# Patient Record
Sex: MALE | Race: WHITE | Employment: OTHER | URBAN - METROPOLITAN AREA
[De-identification: names, ages, dates, MRNs, and addresses within clinical notes are randomized per-mention and may not be internally consistent; named-entity substitution may affect disease eponyms.]

---

## 2017-01-09 ENCOUNTER — TELEPHONE (OUTPATIENT)
Dept: CARDIOTHORACIC SURGERY | Age: 72
End: 2017-01-09

## 2017-01-09 NOTE — TELEPHONE ENCOUNTER
Patients wife got sick over wkend, he has developed alittle congestion. Wanted to know what he could do to help prevent getting worse. He has PAT's 1/12, surgery 1/19. Chanelle patient-Roboctic SABINE takedown/CABG.

## 2017-01-10 NOTE — TELEPHONE ENCOUNTER
Pt has some nasal drainage and congestion. No cough, fever, chills. Will monitor temp daily. Has PAT on 1/12, will eval then for surgery. Will discuss w/ Blade.

## 2017-01-12 ENCOUNTER — HOSPITAL ENCOUNTER (OUTPATIENT)
Dept: PREADMISSION TESTING | Age: 72
Discharge: HOME | End: 2017-01-12
Attending: NURSE PRACTITIONER
Payer: MEDICARE

## 2017-01-12 ENCOUNTER — HOSPITAL ENCOUNTER (OUTPATIENT)
Dept: GENERAL RADIOLOGY | Age: 72
Discharge: HOME | End: 2017-01-12
Attending: NURSE PRACTITIONER
Payer: MEDICARE

## 2017-01-12 ENCOUNTER — HOSPITAL ENCOUNTER (OUTPATIENT)
Dept: VASCULAR SURGERY | Age: 72
Discharge: HOME | End: 2017-01-12
Attending: NURSE PRACTITIONER
Payer: MEDICARE

## 2017-01-12 ENCOUNTER — TELEPHONE (OUTPATIENT)
Dept: CARDIOLOGY CLINIC | Age: 72
End: 2017-01-12

## 2017-01-12 ENCOUNTER — HOSPITAL ENCOUNTER (OUTPATIENT)
Dept: PULMONOLOGY | Age: 72
Discharge: HOME | End: 2017-01-12
Attending: NURSE PRACTITIONER
Payer: MEDICARE

## 2017-01-12 VITALS
SYSTOLIC BLOOD PRESSURE: 127 MMHG | WEIGHT: 244 LBS | BODY MASS INDEX: 32.34 KG/M2 | DIASTOLIC BLOOD PRESSURE: 77 MMHG | HEIGHT: 73 IN

## 2017-01-12 DIAGNOSIS — I25.10 ATHEROSCLEROSIS OF NATIVE CORONARY ARTERY OF NATIVE HEART WITHOUT ANGINA PECTORIS: Primary | ICD-10-CM

## 2017-01-12 DIAGNOSIS — I25.10 ATHEROSCLEROSIS OF NATIVE CORONARY ARTERY OF NATIVE HEART WITHOUT ANGINA PECTORIS: ICD-10-CM

## 2017-01-12 LAB
ALBUMIN SERPL BCP-MCNC: 3.9 G/DL (ref 3.5–5)
ALBUMIN/GLOB SERPL: 1.5 {RATIO} (ref 1.1–2.2)
ALP SERPL-CCNC: 58 U/L (ref 45–117)
ALT SERPL-CCNC: 21 U/L (ref 12–78)
ANION GAP BLD CALC-SCNC: 8 MMOL/L (ref 5–15)
APPEARANCE UR: CLEAR
APTT PPP: 26.7 SEC (ref 22.1–32.5)
ARTERIAL PATENCY WRIST A: YES
AST SERPL W P-5'-P-CCNC: 12 U/L (ref 15–37)
ATRIAL RATE: 67 BPM
BACTERIA URNS QL MICRO: NEGATIVE /HPF
BASE DEFICIT BLD-SCNC: 1 MMOL/L
BASOPHILS # BLD AUTO: 0 K/UL (ref 0–0.1)
BASOPHILS # BLD: 0 % (ref 0–1)
BDY SITE: NORMAL
BILIRUB SERPL-MCNC: 0.9 MG/DL (ref 0.2–1)
BILIRUB UR QL: NEGATIVE
BUN SERPL-MCNC: 17 MG/DL (ref 6–20)
BUN/CREAT SERPL: 20 (ref 12–20)
CALCIUM SERPL-MCNC: 8.9 MG/DL (ref 8.5–10.1)
CALCULATED P AXIS, ECG09: 34 DEGREES
CALCULATED R AXIS, ECG10: -2 DEGREES
CALCULATED T AXIS, ECG11: 13 DEGREES
CHLORIDE SERPL-SCNC: 105 MMOL/L (ref 97–108)
CO2 SERPL-SCNC: 27 MMOL/L (ref 21–32)
COLOR UR: NORMAL
CREAT SERPL-MCNC: 0.85 MG/DL (ref 0.7–1.3)
DIAGNOSIS, 93000: NORMAL
DIFFERENTIAL METHOD BLD: ABNORMAL
EOSINOPHIL # BLD: 0.3 K/UL (ref 0–0.4)
EOSINOPHIL NFR BLD: 4 % (ref 0–7)
EPITH CASTS URNS QL MICRO: NORMAL /LPF
ERYTHROCYTE [DISTWIDTH] IN BLOOD BY AUTOMATED COUNT: 17.4 % (ref 11.5–14.5)
EST. AVERAGE GLUCOSE BLD GHB EST-MCNC: 128 MG/DL
GAS FLOW.O2 O2 DELIVERY SYS: NORMAL L/MIN
GLOBULIN SER CALC-MCNC: 2.6 G/DL (ref 2–4)
GLUCOSE SERPL-MCNC: 126 MG/DL (ref 65–100)
GLUCOSE UR STRIP.AUTO-MCNC: NEGATIVE MG/DL
HBA1C MFR BLD: 6.1 % (ref 4.2–6.3)
HCO3 BLD-SCNC: 23.9 MMOL/L (ref 22–26)
HCT VFR BLD AUTO: 31.3 % (ref 36.6–50.3)
HGB BLD-MCNC: 10.2 G/DL (ref 12.1–17)
HGB UR QL STRIP: NEGATIVE
HYALINE CASTS URNS QL MICRO: NORMAL /LPF (ref 0–5)
INR PPP: 1.1 (ref 0.9–1.1)
KETONES UR QL STRIP.AUTO: NEGATIVE MG/DL
LEUKOCYTE ESTERASE UR QL STRIP.AUTO: NEGATIVE
LYMPHOCYTES # BLD AUTO: 31 % (ref 12–49)
LYMPHOCYTES # BLD: 2 K/UL (ref 0.8–3.5)
MAGNESIUM SERPL-MCNC: 1.7 MG/DL (ref 1.6–2.4)
MCH RBC QN AUTO: 19.1 PG (ref 26–34)
MCHC RBC AUTO-ENTMCNC: 32.6 G/DL (ref 30–36.5)
MCV RBC AUTO: 58.7 FL (ref 80–99)
MONOCYTES # BLD: 0.4 K/UL (ref 0–1)
MONOCYTES NFR BLD AUTO: 7 % (ref 5–13)
NEUTS SEG # BLD: 3.7 K/UL (ref 1.8–8)
NEUTS SEG NFR BLD AUTO: 58 % (ref 32–75)
NITRITE UR QL STRIP.AUTO: NEGATIVE
O2/TOTAL GAS SETTING VFR VENT: 21 %
P-R INTERVAL, ECG05: 204 MS
PCO2 BLD: 37.2 MMHG (ref 35–45)
PH BLD: 7.42 [PH] (ref 7.35–7.45)
PH UR STRIP: 5.5 [PH] (ref 5–8)
PLATELET # BLD AUTO: 197 K/UL (ref 150–400)
PO2 BLD: 85 MMHG (ref 80–100)
POTASSIUM SERPL-SCNC: 3.7 MMOL/L (ref 3.5–5.1)
PROT SERPL-MCNC: 6.5 G/DL (ref 6.4–8.2)
PROT UR STRIP-MCNC: NEGATIVE MG/DL
PROTHROMBIN TIME: 10.9 SEC (ref 9–11.1)
Q-T INTERVAL, ECG07: 402 MS
QRS DURATION, ECG06: 102 MS
QTC CALCULATION (BEZET), ECG08: 424 MS
RBC # BLD AUTO: 5.33 M/UL (ref 4.1–5.7)
RBC #/AREA URNS HPF: NORMAL /HPF (ref 0–5)
RBC MORPH BLD: ABNORMAL
SAO2 % BLD: 97 % (ref 92–97)
SODIUM SERPL-SCNC: 140 MMOL/L (ref 136–145)
SP GR UR REFRACTOMETRY: 1.02 (ref 1–1.03)
SPECIMEN TYPE: NORMAL
THERAPEUTIC RANGE,PTTT: NORMAL SECS (ref 58–77)
TSH SERPL DL<=0.05 MIU/L-ACNC: 1.81 UIU/ML (ref 0.36–3.74)
UA: UC IF INDICATED,UAUC: NORMAL
UROBILINOGEN UR QL STRIP.AUTO: 0.2 EU/DL (ref 0.2–1)
VENTRICULAR RATE, ECG03: 67 BPM
WBC # BLD AUTO: 6.4 K/UL (ref 4.1–11.1)
WBC URNS QL MICRO: NORMAL /HPF (ref 0–4)

## 2017-01-12 PROCEDURE — 82803 BLOOD GASES ANY COMBINATION: CPT

## 2017-01-12 PROCEDURE — 85025 COMPLETE CBC W/AUTO DIFF WBC: CPT | Performed by: NURSE PRACTITIONER

## 2017-01-12 PROCEDURE — 83735 ASSAY OF MAGNESIUM: CPT | Performed by: NURSE PRACTITIONER

## 2017-01-12 PROCEDURE — 84443 ASSAY THYROID STIM HORMONE: CPT | Performed by: NURSE PRACTITIONER

## 2017-01-12 PROCEDURE — 83036 HEMOGLOBIN GLYCOSYLATED A1C: CPT | Performed by: NURSE PRACTITIONER

## 2017-01-12 PROCEDURE — 71020 XR CHEST PA LAT: CPT

## 2017-01-12 PROCEDURE — 85730 THROMBOPLASTIN TIME PARTIAL: CPT | Performed by: NURSE PRACTITIONER

## 2017-01-12 PROCEDURE — 81001 URINALYSIS AUTO W/SCOPE: CPT | Performed by: NURSE PRACTITIONER

## 2017-01-12 PROCEDURE — 93880 EXTRACRANIAL BILAT STUDY: CPT

## 2017-01-12 PROCEDURE — 93005 ELECTROCARDIOGRAM TRACING: CPT

## 2017-01-12 PROCEDURE — 36600 WITHDRAWAL OF ARTERIAL BLOOD: CPT

## 2017-01-12 PROCEDURE — 86900 BLOOD TYPING SEROLOGIC ABO: CPT | Performed by: NURSE PRACTITIONER

## 2017-01-12 PROCEDURE — 80053 COMPREHEN METABOLIC PANEL: CPT | Performed by: NURSE PRACTITIONER

## 2017-01-12 PROCEDURE — 36415 COLL VENOUS BLD VENIPUNCTURE: CPT | Performed by: NURSE PRACTITIONER

## 2017-01-12 PROCEDURE — 85610 PROTHROMBIN TIME: CPT | Performed by: NURSE PRACTITIONER

## 2017-01-12 PROCEDURE — 86920 COMPATIBILITY TEST SPIN: CPT | Performed by: NURSE PRACTITIONER

## 2017-01-12 PROCEDURE — 94010 BREATHING CAPACITY TEST: CPT

## 2017-01-12 RX ORDER — AMIODARONE HYDROCHLORIDE 400 MG/1
400 TABLET ORAL DAILY
Qty: 2 TAB | Refills: 0 | Status: SHIPPED | OUTPATIENT
Start: 2017-01-17 | End: 2017-01-24

## 2017-01-12 RX ORDER — METOPROLOL TARTRATE 25 MG/1
12.5 TABLET, FILM COATED ORAL 2 TIMES DAILY
Qty: 2 TAB | Refills: 0 | Status: SHIPPED | OUTPATIENT
Start: 2017-01-17 | End: 2017-01-24

## 2017-01-12 RX ORDER — CHLORHEXIDINE GLUCONATE 1.2 MG/ML
15 RINSE ORAL 2 TIMES DAILY
Qty: 60 ML | Refills: 0 | Status: SHIPPED | OUTPATIENT
Start: 2017-01-17 | End: 2017-01-24

## 2017-01-12 RX ORDER — MUPIROCIN 20 MG/G
OINTMENT TOPICAL 2 TIMES DAILY
Qty: 22 G | Refills: 0 | Status: SHIPPED | OUTPATIENT
Start: 2017-01-17 | End: 2017-01-24

## 2017-01-12 NOTE — PROGRESS NOTES
Anesthesia  Consult note     Anesthesia consult requested by the surgeon for:  suitability of patient for General anesthesia for  minimal  Invasive surgery   contraindications for  One lung ventilation  Contraindication for CRISTIANA  Suitability for invasive lines    Subjective:      Patient:  Gonzalez Stack     Procedure:       No Known Allergies    Current Outpatient Prescriptions   Medication Sig    CRESTOR 10 mg tablet TAKE ONE TABLET BY MOUTH EVERY EVENING    SITAGLIPTIN PHOSPHATE (JANUVIA PO) Take  by mouth.  tadalafil (CIALIS) 5 mg tablet Take 5 mg by mouth daily.  amLODIPine (NORVASC) 10 mg tablet Take  by mouth daily.  aspirin delayed-release 81 mg tablet Take  by mouth daily.  lisinopril-hydrochlorothiazide (PRINZIDE, ZESTORETIC) 20-25 mg per tablet Take 1 Tab by mouth daily. No current facility-administered medications for this encounter. Past Medical History   Diagnosis Date    DJD (degenerative joint disease) of knee 11/2013     left TKR    ED (erectile dysfunction)     Essential hypertension     Hyperlipidemia     T2DM (type 2 diabetes mellitus) ([facility]     Thalassemia minor        Past Surgical History   Procedure Laterality Date    Hx heart catheterization  2004     mild CAD, EF 60-65%    Stress test cardiolite  5/14/2004     5 min, inferior ischemia, EF 60%    Stress test cardiolite  6/30/11     4 min, normal perfusion EF 50%    Hx knee arthroscopy Left 11/2013    Hx shoulder replacement Right 1968       Social History   Substance Use Topics    Smoking status: Former Smoker     Packs/day: 2.00     Years: 15.00     Quit date: 11/4/1981    Smokeless tobacco: Never Used      Comment: quit > 30 years ago    Alcohol use Yes      Comment: rare social          Anesthetic Problems: None in the past   patient denies any problems with swallowing, esophageal stricture, upper GI bleed, GI surgery. No contraindications for CRISTIANA.     Objective:     Physical Exam:    No data found.      No data recorded. Airway Class: 1  Heart-  Regular rate and rhythm,   s1 s2 heard 2/6 murmer. Lungs- Clear bilateral on ascultation  Abd- Soft, non tender no organomegaly  Limbs- Normal  Neuro- Normal    Labs:   Recent Results (from the past 24 hour(s))   EKG, 12 LEAD, INITIAL    Collection Time: 01/12/17 10:33 AM   Result Value Ref Range    Ventricular Rate 67 BPM    Atrial Rate 67 BPM    P-R Interval 204 ms    QRS Duration 102 ms    Q-T Interval 402 ms    QTC Calculation (Bezet) 424 ms    Calculated P Axis 34 degrees    Calculated R Axis -2 degrees    Calculated T Axis 13 degrees    Diagnosis       Normal sinus rhythm  Normal ECG  When compared with ECG of 04-NOV-2013 11:43,  No significant change was found     POC G3 - PUL    Collection Time: 01/12/17 11:18 AM   Result Value Ref Range    FIO2 (POC) 21 %    pH (POC) 7.416 7.35 - 7.45      pCO2 (POC) 37.2 35.0 - 45.0 MMHG    pO2 (POC) 85 80 - 100 MMHG    HCO3 (POC) 23.9 22 - 26 MMOL/L    sO2 (POC) 97 92 - 97 %    Base deficit (POC) 1 mmol/L    Site LEFT RADIAL      Device: ROOM AIR      Allens test (POC) YES      Specimen type (POC) ARTERIAL           Assessment:       Active Problems:    * No active hospital problems. *      ASA4    Plan/Recommendations/Medical Decision Making:       Anesthetic Plan: GETA with invasive monitoring, post op ventilation and CRISTIANA monitoring  Risks and benefits of the anesthetic plan discussed and agreed upon by the patient.       no contraindications  For one lung ventilation, large bore lines, CRISTIANA  Signed By: Diallo Landa MD  Date:           1/12/2017  Time:          12:23 PM

## 2017-01-12 NOTE — PROCEDURES
[facility]  *** FINAL REPORT ***    Name: Lesley Kay  MRN: [id number]    Outpatient  : 18 Mar 1945  HIS Order #: [id number]  [address] [facility] Visit #: [id number]  Date: 2017    TYPE OF TEST: Cerebrovascular Duplex    REASON FOR TEST  Pre op    Right Carotid:-             Proximal               Mid                 Distal  cm/s  Systolic  Diastolic  Systolic  Diastolic  Systolic  Diastolic  CCA:     02.3      17.0                            70.0      21.0  Bulb:  ECA:     78.0      12.0  ICA:     59.0      21.0                            57.0      17.0  ICA/CCA:  0.8       1.0    ICA Stenosis:    Right Vertebral:-  Finding: Antegrade  Sys:       42.0  Sosa:       14.0    Right Subclavian:    Left Carotid:-            Proximal                Mid                 Distal  cm/s  Systolic  Diastolic  Systolic  Diastolic  Systolic  Diastolic  CCA:     23.3      19.0                            70.0      17.0  Bulb:  ECA:     59.0       9.0  ICA:     72.0      18.0                            49.0      18.0  ICA/CCA:  1.0       1.1    ICA Stenosis:    Left Vertebral:-  Finding: Antegrade  Sys:       42.0  Sosa:       13.0    Left Subclavian:    INTERPRETATION/FINDINGS  PROCEDURE:  Color duplex ultrasound imaging of extracranial  cerebrovascular arteries. FINDINGS:       Right:  Internal carotid velocity is within normal limits. There   is narrowing of the flow channel on color Doppler imaging and  non-calcific plaque on B-mode imaging, consistent with less than 50  percent stenosis. The common and external carotid arteries are patent   and without evidence of hemodynamically significant stenosis. Left:  nternal carotid velocity is within normal limits. There  is narrowing of the flow channel on color Doppler imaging and calcific   plaque on B-mode imaging, consistent with less than 50 percent  stenosis.   The common and external carotid arteries are patent and  without evidence of hemodynamically significant stenosis. IMPRESSION:  Consistent with less than 50% stenosis of the right  internal carotid and less than 50% stenosis of the left internal  carotid. Vertebrals are patent with antegrade flow. ADDITIONAL COMMENTS    I have personally reviewed the data relevant to the interpretation of  this  study. TECHNOLOGIST: Dania Mueller.  Jose Alejandro Christopher  Signed: 01/12/2017 11:04 AM    PHYSICIAN: Michael Lopez MD  Signed: 01/12/2017 12:04 PM

## 2017-01-12 NOTE — TELEPHONE ENCOUNTER
Elina Loyola called to inform our providers that the patient is scheduled to have Coronary Artery Bypass Graft Surgery (CABG) take down on January 19, 2017 at [facility]. If necessary, she can be reached at [phone]. Thanks!

## 2017-01-13 NOTE — PROCEDURES
[address]   [address], [address]   PULMONARY FUNCTION       Name:  Marlon Fine   MR#:  [id number]   :  1945   Account #:  [de-identified]    Date of Procedure:  2017   Date of Adm:  2017       CLINICAL INDICATIONS: Shortness of breath. SPIROMETRY PERFORMED:  Spirometry is within normal limits,   without evidence of air flow obstruction.   The flow volume loop is   normal.         MD SIOMARA Webster / Yoshi Forde   D:  2017   11:20   T:  2017   13:25   Job #:  [id number]

## 2017-01-17 ENCOUNTER — TELEPHONE (OUTPATIENT)
Dept: CARDIOLOGY CLINIC | Age: 72
End: 2017-01-17

## 2017-01-17 NOTE — TELEPHONE ENCOUNTER
Called cath lab and reserved cath lab for inpatient PCI Monday 1/23/17 at 8:15. Notified Dr. Elmer Caruso. Orders, auth, etc to be done by hospital since \"inpatient status. \"

## 2017-01-17 NOTE — TELEPHONE ENCOUNTER
----- Message from Luna Nichols MD sent at 1/17/2017  8:25 AM EST -----  Please schedule for PCI Monday a.m. 1/23. He should be an inpatient following CABG.

## 2017-01-18 ENCOUNTER — ANESTHESIA EVENT (OUTPATIENT)
Dept: CARDIOTHORACIC SURGERY | Age: 72
DRG: 232 | End: 2017-01-18
Payer: MEDICARE

## 2017-01-18 ENCOUNTER — TELEPHONE (OUTPATIENT)
Dept: CASE MANAGEMENT | Age: 72
End: 2017-01-18

## 2017-01-18 NOTE — TELEPHONE ENCOUNTER
Cardiac Surgery Care Coordinator-  Called Jeanette Christiansen to review plan of care and day of surgery expectations. Encouraged Jeanette Christiansen to verbalize and offered emotional support. Jeanette Christiansen is without questions or concerns at this time.   Yosi Viera RN

## 2017-01-19 ENCOUNTER — ANESTHESIA (OUTPATIENT)
Dept: CARDIOTHORACIC SURGERY | Age: 72
DRG: 232 | End: 2017-01-19
Payer: MEDICARE

## 2017-01-19 ENCOUNTER — APPOINTMENT (OUTPATIENT)
Dept: GENERAL RADIOLOGY | Age: 72
DRG: 232 | End: 2017-01-19
Attending: NURSE PRACTITIONER
Payer: MEDICARE

## 2017-01-19 PROBLEM — I25.10 CAD (CORONARY ARTERY DISEASE): Status: ACTIVE | Noted: 2017-01-19

## 2017-01-19 PROCEDURE — 77030008771 HC TU NG SALEM SUMP -A: Performed by: ANESTHESIOLOGY

## 2017-01-19 PROCEDURE — 77030008671 HC TU ENDO/BRNC CUF COVD -B: Performed by: ANESTHESIOLOGY

## 2017-01-19 PROCEDURE — 77030008684 HC TU ET CUF COVD -B: Performed by: ANESTHESIOLOGY

## 2017-01-19 PROCEDURE — 71010 XR CHEST PORT: CPT

## 2017-01-19 PROCEDURE — 74011000258 HC RX REV CODE- 258

## 2017-01-19 PROCEDURE — 74011250636 HC RX REV CODE- 250/636: Performed by: NURSE PRACTITIONER

## 2017-01-19 PROCEDURE — 77030026438 HC STYL ET INTUB CARD -A: Performed by: ANESTHESIOLOGY

## 2017-01-19 PROCEDURE — 74011636637 HC RX REV CODE- 636/637

## 2017-01-19 PROCEDURE — 74011000250 HC RX REV CODE- 250

## 2017-01-19 PROCEDURE — P9045 ALBUMIN (HUMAN), 5%, 250 ML: HCPCS

## 2017-01-19 PROCEDURE — 77030020061 HC IV BLD WRMR ADMIN SET 3M -B: Performed by: ANESTHESIOLOGY

## 2017-01-19 PROCEDURE — 74011250636 HC RX REV CODE- 250/636

## 2017-01-19 RX ORDER — PROPOFOL 10 MG/ML
INJECTION, EMULSION INTRAVENOUS
Status: DISCONTINUED | OUTPATIENT
Start: 2017-01-19 | End: 2017-01-19 | Stop reason: HOSPADM

## 2017-01-19 RX ORDER — SODIUM CHLORIDE, SODIUM LACTATE, POTASSIUM CHLORIDE, CALCIUM CHLORIDE 600; 310; 30; 20 MG/100ML; MG/100ML; MG/100ML; MG/100ML
INJECTION, SOLUTION INTRAVENOUS
Status: DISCONTINUED | OUTPATIENT
Start: 2017-01-19 | End: 2017-01-19

## 2017-01-19 RX ORDER — ALBUMIN HUMAN 50 G/1000ML
SOLUTION INTRAVENOUS AS NEEDED
Status: DISCONTINUED | OUTPATIENT
Start: 2017-01-19 | End: 2017-01-19 | Stop reason: HOSPADM

## 2017-01-19 RX ORDER — SUCCINYLCHOLINE CHLORIDE 20 MG/ML
INJECTION INTRAMUSCULAR; INTRAVENOUS AS NEEDED
Status: DISCONTINUED | OUTPATIENT
Start: 2017-01-19 | End: 2017-01-19 | Stop reason: HOSPADM

## 2017-01-19 RX ORDER — PROPOFOL 10 MG/ML
INJECTION, EMULSION INTRAVENOUS
Status: DISCONTINUED | OUTPATIENT
Start: 2017-01-19 | End: 2017-01-19

## 2017-01-19 RX ORDER — ROCURONIUM BROMIDE 10 MG/ML
INJECTION, SOLUTION INTRAVENOUS AS NEEDED
Status: DISCONTINUED | OUTPATIENT
Start: 2017-01-19 | End: 2017-01-19 | Stop reason: HOSPADM

## 2017-01-19 RX ORDER — MORPHINE SULFATE 4 MG/ML
INJECTION, SOLUTION INTRAMUSCULAR; INTRAVENOUS AS NEEDED
Status: DISCONTINUED | OUTPATIENT
Start: 2017-01-19 | End: 2017-01-19 | Stop reason: HOSPADM

## 2017-01-19 RX ORDER — SODIUM CHLORIDE 9 MG/ML
INJECTION, SOLUTION INTRAVENOUS
Status: DISCONTINUED | OUTPATIENT
Start: 2017-01-19 | End: 2017-01-19 | Stop reason: HOSPADM

## 2017-01-19 RX ORDER — GLYCOPYRROLATE 0.2 MG/ML
INJECTION INTRAMUSCULAR; INTRAVENOUS AS NEEDED
Status: DISCONTINUED | OUTPATIENT
Start: 2017-01-19 | End: 2017-01-19 | Stop reason: HOSPADM

## 2017-01-19 RX ORDER — PHENYLEPHRINE HCL IN 0.9% NACL 0.4MG/10ML
SYRINGE (ML) INTRAVENOUS AS NEEDED
Status: DISCONTINUED | OUTPATIENT
Start: 2017-01-19 | End: 2017-01-19 | Stop reason: HOSPADM

## 2017-01-19 RX ORDER — MIDAZOLAM HYDROCHLORIDE 1 MG/ML
INJECTION, SOLUTION INTRAMUSCULAR; INTRAVENOUS AS NEEDED
Status: DISCONTINUED | OUTPATIENT
Start: 2017-01-19 | End: 2017-01-19 | Stop reason: HOSPADM

## 2017-01-19 RX ORDER — HEPARIN SODIUM 1000 [USP'U]/ML
INJECTION, SOLUTION INTRAVENOUS; SUBCUTANEOUS AS NEEDED
Status: DISCONTINUED | OUTPATIENT
Start: 2017-01-19 | End: 2017-01-19 | Stop reason: HOSPADM

## 2017-01-19 RX ORDER — SUFENTANIL CITRATE 50 UG/ML
INJECTION EPIDURAL; INTRAVENOUS AS NEEDED
Status: DISCONTINUED | OUTPATIENT
Start: 2017-01-19 | End: 2017-01-19 | Stop reason: HOSPADM

## 2017-01-19 RX ORDER — SODIUM CHLORIDE, SODIUM LACTATE, POTASSIUM CHLORIDE, CALCIUM CHLORIDE 600; 310; 30; 20 MG/100ML; MG/100ML; MG/100ML; MG/100ML
INJECTION, SOLUTION INTRAVENOUS
Status: DISCONTINUED | OUTPATIENT
Start: 2017-01-19 | End: 2017-01-19 | Stop reason: HOSPADM

## 2017-01-19 RX ORDER — NEOSTIGMINE METHYLSULFATE 1 MG/ML
INJECTION INTRAVENOUS AS NEEDED
Status: DISCONTINUED | OUTPATIENT
Start: 2017-01-19 | End: 2017-01-19 | Stop reason: HOSPADM

## 2017-01-19 RX ORDER — PROTAMINE SULFATE 10 MG/ML
INJECTION, SOLUTION INTRAVENOUS AS NEEDED
Status: DISCONTINUED | OUTPATIENT
Start: 2017-01-19 | End: 2017-01-19 | Stop reason: HOSPADM

## 2017-01-19 RX ORDER — SUFENTANIL CITRATE 50 UG/ML
INJECTION EPIDURAL; INTRAVENOUS
Status: DISCONTINUED | OUTPATIENT
Start: 2017-01-19 | End: 2017-01-19 | Stop reason: HOSPADM

## 2017-01-19 RX ORDER — PROPOFOL 10 MG/ML
INJECTION, EMULSION INTRAVENOUS AS NEEDED
Status: DISCONTINUED | OUTPATIENT
Start: 2017-01-19 | End: 2017-01-19 | Stop reason: HOSPADM

## 2017-01-19 RX ADMIN — ALBUMIN HUMAN 250 ML: 50 SOLUTION INTRAVENOUS at 14:07

## 2017-01-19 RX ADMIN — ROCURONIUM BROMIDE 10 MG: 10 INJECTION, SOLUTION INTRAVENOUS at 13:50

## 2017-01-19 RX ADMIN — Medication 40 MCG: at 15:43

## 2017-01-19 RX ADMIN — PROPOFOL 25 MCG/KG/MIN: 10 INJECTION, EMULSION INTRAVENOUS at 15:30

## 2017-01-19 RX ADMIN — ROCURONIUM BROMIDE 10 MG: 10 INJECTION, SOLUTION INTRAVENOUS at 12:15

## 2017-01-19 RX ADMIN — ROCURONIUM BROMIDE 20 MG: 10 INJECTION, SOLUTION INTRAVENOUS at 10:42

## 2017-01-19 RX ADMIN — ALBUMIN HUMAN 250 ML: 50 SOLUTION INTRAVENOUS at 15:17

## 2017-01-19 RX ADMIN — SODIUM CHLORIDE, SODIUM LACTATE, POTASSIUM CHLORIDE, CALCIUM CHLORIDE: 600; 310; 30; 20 INJECTION, SOLUTION INTRAVENOUS at 08:16

## 2017-01-19 RX ADMIN — ROCURONIUM BROMIDE 10 MG: 10 INJECTION, SOLUTION INTRAVENOUS at 09:36

## 2017-01-19 RX ADMIN — ROCURONIUM BROMIDE 5 MG: 10 INJECTION, SOLUTION INTRAVENOUS at 08:16

## 2017-01-19 RX ADMIN — SODIUM CHLORIDE: 9 INJECTION, SOLUTION INTRAVENOUS at 08:16

## 2017-01-19 RX ADMIN — ROCURONIUM BROMIDE 20 MG: 10 INJECTION, SOLUTION INTRAVENOUS at 13:30

## 2017-01-19 RX ADMIN — HEPARIN SODIUM 10000 UNITS: 1000 INJECTION, SOLUTION INTRAVENOUS; SUBCUTANEOUS at 14:13

## 2017-01-19 RX ADMIN — Medication 40 MCG: at 09:58

## 2017-01-19 RX ADMIN — PROPOFOL 150 MG: 10 INJECTION, EMULSION INTRAVENOUS at 08:16

## 2017-01-19 RX ADMIN — Medication 80 MCG: at 08:16

## 2017-01-19 RX ADMIN — SUFENTANIL CITRATE 30 MCG: 50 INJECTION EPIDURAL; INTRAVENOUS at 08:16

## 2017-01-19 RX ADMIN — Medication 40 MCG: at 10:21

## 2017-01-19 RX ADMIN — Medication 40 MCG: at 09:57

## 2017-01-19 RX ADMIN — PROTAMINE SULFATE 150 MG: 10 INJECTION, SOLUTION INTRAVENOUS at 15:32

## 2017-01-19 RX ADMIN — SUCCINYLCHOLINE CHLORIDE 40 MG: 20 INJECTION INTRAMUSCULAR; INTRAVENOUS at 16:38

## 2017-01-19 RX ADMIN — Medication 80 MCG: at 11:24

## 2017-01-19 RX ADMIN — ROCURONIUM BROMIDE 45 MG: 10 INJECTION, SOLUTION INTRAVENOUS at 08:25

## 2017-01-19 RX ADMIN — Medication 40 MCG: at 10:42

## 2017-01-19 RX ADMIN — SUCCINYLCHOLINE CHLORIDE 160 MG: 20 INJECTION INTRAMUSCULAR; INTRAVENOUS at 08:16

## 2017-01-19 RX ADMIN — SUFENTANIL CITRATE 0.3 MCG/KG/HR: 50 INJECTION EPIDURAL; INTRAVENOUS at 08:41

## 2017-01-19 RX ADMIN — PROPOFOL 10 MG: 10 INJECTION, EMULSION INTRAVENOUS at 16:00

## 2017-01-19 RX ADMIN — Medication 80 MCG: at 12:45

## 2017-01-19 RX ADMIN — CEFAZOLIN 2 G: 1 INJECTION, POWDER, FOR SOLUTION INTRAMUSCULAR; INTRAVENOUS; PARENTERAL at 08:46

## 2017-01-19 RX ADMIN — MIDAZOLAM HYDROCHLORIDE 2 MG: 1 INJECTION, SOLUTION INTRAMUSCULAR; INTRAVENOUS at 08:11

## 2017-01-19 RX ADMIN — ROCURONIUM BROMIDE 20 MG: 10 INJECTION, SOLUTION INTRAVENOUS at 13:59

## 2017-01-19 RX ADMIN — HEPARIN SODIUM 5000 UNITS: 1000 INJECTION, SOLUTION INTRAVENOUS; SUBCUTANEOUS at 14:22

## 2017-01-19 RX ADMIN — NEOSTIGMINE METHYLSULFATE 2.5 MG: 1 INJECTION INTRAVENOUS at 16:41

## 2017-01-19 RX ADMIN — GLYCOPYRROLATE 0.4 MG: 0.2 INJECTION INTRAMUSCULAR; INTRAVENOUS at 16:41

## 2017-01-19 RX ADMIN — CEFAZOLIN 2 G: 1 INJECTION, POWDER, FOR SOLUTION INTRAMUSCULAR; INTRAVENOUS; PARENTERAL at 11:46

## 2017-01-19 RX ADMIN — MORPHINE SULFATE 2 MG: 4 INJECTION, SOLUTION INTRAMUSCULAR; INTRAVENOUS at 16:16

## 2017-01-19 RX ADMIN — MORPHINE SULFATE 2 MG: 4 INJECTION, SOLUTION INTRAMUSCULAR; INTRAVENOUS at 15:36

## 2017-01-19 RX ADMIN — ROCURONIUM BROMIDE 10 MG: 10 INJECTION, SOLUTION INTRAVENOUS at 10:14

## 2017-01-19 RX ADMIN — CEFAZOLIN 2 G: 1 INJECTION, POWDER, FOR SOLUTION INTRAMUSCULAR; INTRAVENOUS; PARENTERAL at 15:08

## 2017-01-19 NOTE — ANESTHESIA PREPROCEDURE EVALUATION
Anesthetic History   No history of anesthetic complications            Review of Systems / Medical History  Patient summary reviewed, nursing notes reviewed and pertinent labs reviewed    Pulmonary  Within defined limits                 Neuro/Psych   Within defined limits           Cardiovascular    Hypertension          CAD and hyperlipidemia         GI/Hepatic/Renal                Endo/Other    Diabetes: type 2    Blood dyscrasia (thalassemia minor)     Other Findings              Physical Exam    Airway  Mallampati: II  TM Distance: > 6 cm  Neck ROM: normal range of motion   Mouth opening: Normal     Cardiovascular    Rhythm: regular  Rate: normal         Dental    Dentition: Bridges, Caps/crowns, Lower dentition intact and Upper dentition intact     Pulmonary  Breath sounds clear to auscultation               Abdominal  GI exam deferred       Other Findings            Anesthetic Plan    ASA: 4  Anesthesia type: general    Monitoring Plan: Arterial line, BIS, CVP, Callahan-Rebekah and CRISTIANA    Post procedure ventilation   Induction: Intravenous  Anesthetic plan and risks discussed with: Patient      Double lumen tube

## 2017-01-19 NOTE — ANESTHESIA PROCEDURE NOTES
Central Line and Pulmonary Artery Catheter Placement    Start time: 1/19/2017 7:11 AM  End time: 1/19/2017 7:27 AM  Performed by: Gabriela Hernandez  Authorized by: Prashanth VOGT     Indications: vascular access, central pressure monitoring and need for vasopressors  Preanesthetic Checklist: patient identified, risks and benefits discussed, anesthesia consent, site marked, patient being monitored and timeout performed      Pre-procedure: All elements of maximal sterile barrier technique followed?  Yes    Maximal barrier precautions followed, 2% Chlorhexidine for cutaneous antisepsis, Hand hygiene performed prior to catheter insertion and Ultrasound guidance    Sterile Ultrasound Technique followed?: Yes          Procedure:   Prep:  Chlorhexidine  Location:  Internal jugular  Orientation:  Right  Patient position:  Trendelenburg  Catheter type:  Double lumen  Catheter size:  9 Fr  Catheter length:  20 cm  Number of attempts:  1  Successful placement: Yes      Assessment:   Post-procedure:  Catheter secured, sterile dressing applied and sterile dressing with CHG applied  Assessment:  Blood return through all ports  Insertion:  Uncomplicated  Patient tolerance:  Patient tolerated the procedure well with no immediate complications  9 Fr MAC and 8 Fr CCO PA Catheter

## 2017-01-19 NOTE — ANESTHESIA PROCEDURE NOTES
Arterial Line Placement    Start time: 1/19/2017 7:02 AM  End time: 1/19/2017 7:06 AM  Performed by: Vidhya Guzman  Authorized by: Xenia VOGT     Pre-Procedure  Indications:  Arterial pressure monitoring  Preanesthetic Checklist: patient identified, risks and benefits discussed, anesthesia consent, site marked, patient being monitored, timeout performed and patient being monitored      Procedure:   Prep:  Chlorhexidine  Seldinger Technique?: Yes    Orientation:  Right  Location:  Radial artery  Catheter size:  20 G  Number of attempts:  1  Cont Cardiac Output Sensor: No      Assessment:   Post-procedure:  Line secured and sterile dressing applied  Patient Tolerance:  Patient tolerated the procedure well with no immediate complications

## 2017-01-20 ENCOUNTER — APPOINTMENT (OUTPATIENT)
Dept: GENERAL RADIOLOGY | Age: 72
DRG: 232 | End: 2017-01-20
Attending: NURSE PRACTITIONER
Payer: MEDICARE

## 2017-01-20 PROBLEM — Z95.1 S/P CABG X 1: Status: ACTIVE | Noted: 2017-01-19

## 2017-01-20 PROBLEM — D62 POSTOPERATIVE ANEMIA DUE TO ACUTE BLOOD LOSS: Status: ACTIVE | Noted: 2017-01-20

## 2017-01-20 PROCEDURE — 71010 XR CHEST PORT: CPT

## 2017-01-20 NOTE — ANESTHESIA POSTPROCEDURE EVALUATION
Post-Anesthesia Evaluation and Assessment    Patient: Tara Bahena MRN: [id number]  SSN: [id number]    YOB: 1945  Age: 70 y.o. Sex: male       Cardiovascular Function/Vital Signs  Visit Vitals    /57 (BP 1 Location: Right arm, BP Patient Position: Supine)    Pulse 76    Temp 37.2 °C (99 °F)    Resp 14    Ht 6' 1\" (1.854 m)    Wt 113.1 kg (249 lb 5.4 oz)    SpO2 94%    BMI 32.9 kg/m2       Patient is status post general anesthesia for Procedure(s):  DAVINCI SABINE TAKEDOWN, LEFT ANTERIOR THORACOTOMY, OFF PUMP CORONARY ARTERY BYPASS GRAFTING X1, CRISTIANA BY DR ESPINAL. Nausea/Vomiting: None    Postoperative hydration reviewed and adequate. Pain:  Pain Scale 1: Numeric (0 - 10) (01/20/17 1010)  Pain Intensity 1: 4 (01/20/17 1010)   Managed    Neurological Status:   Neuro (WDL): Within Defined Limits (01/19/17 7681)  Neuro  Neurologic State: Drowsy; Eyes open spontaneously (01/19/17 2000)  Orientation Level: Oriented X4 (01/19/17 2000)  Cognition: Decreased attention/concentration; Follows commands (01/19/17 2000)  Speech: Clear (01/19/17 2000)   At baseline    Mental Status and Level of Consciousness: Arousable    Pulmonary Status:   O2 Device: Room air (01/20/17 1000)   Adequate oxygenation and airway patent    Complications related to anesthesia: None    Post-anesthesia assessment completed.  No concerns    Signed By: Edson Cha MD     January 20, 2017

## 2017-01-21 ENCOUNTER — APPOINTMENT (OUTPATIENT)
Dept: GENERAL RADIOLOGY | Age: 72
DRG: 232 | End: 2017-01-21
Attending: NURSE PRACTITIONER
Payer: MEDICARE

## 2017-01-21 LAB
ABO + RH BLD: NORMAL
BLD PROD TYP BPU: NORMAL
BLOOD GROUP ANTIBODIES SERPL: NORMAL
BPU ID: NORMAL
CROSSMATCH RESULT,%XM: NORMAL
SPECIMEN EXP DATE BLD: NORMAL
STATUS OF UNIT,%ST: NORMAL
UNIT DIVISION, %UDIV: 0

## 2017-01-21 PROCEDURE — 71010 XR CHEST PORT: CPT

## 2017-01-22 ENCOUNTER — APPOINTMENT (OUTPATIENT)
Dept: GENERAL RADIOLOGY | Age: 72
DRG: 232 | End: 2017-01-22
Attending: NURSE PRACTITIONER
Payer: MEDICARE

## 2017-01-22 PROCEDURE — 71010 XR CHEST PORT: CPT

## 2017-01-23 ENCOUNTER — APPOINTMENT (OUTPATIENT)
Dept: GENERAL RADIOLOGY | Age: 72
DRG: 232 | End: 2017-01-23
Attending: NURSE PRACTITIONER
Payer: MEDICARE

## 2017-01-23 PROCEDURE — 71010 XR CHEST PORT: CPT

## 2017-01-24 ENCOUNTER — APPOINTMENT (OUTPATIENT)
Dept: GENERAL RADIOLOGY | Age: 72
DRG: 232 | End: 2017-01-24
Attending: NURSE PRACTITIONER
Payer: MEDICARE

## 2017-01-24 PROCEDURE — 71020 XR CHEST PA LAT: CPT

## 2017-01-25 ENCOUNTER — TELEPHONE (OUTPATIENT)
Dept: CASE MANAGEMENT | Age: 72
End: 2017-01-25

## 2017-01-25 NOTE — TELEPHONE ENCOUNTER
Cardiac Surgery Discharge - Follow up call placed to [facility]. Left voicemail message. Kellie Jade RN

## 2017-01-27 ENCOUNTER — OFFICE VISIT (OUTPATIENT)
Dept: CARDIOTHORACIC SURGERY | Age: 72
End: 2017-01-27

## 2017-01-27 VITALS
BODY MASS INDEX: 32.97 KG/M2 | DIASTOLIC BLOOD PRESSURE: 68 MMHG | WEIGHT: 248.8 LBS | SYSTOLIC BLOOD PRESSURE: 122 MMHG | TEMPERATURE: 98 F | HEIGHT: 73 IN | HEART RATE: 62 BPM | OXYGEN SATURATION: 96 % | RESPIRATION RATE: 16 BRPM

## 2017-01-27 DIAGNOSIS — Z95.1 S/P CABG X 1: Primary | ICD-10-CM

## 2017-01-27 RX ORDER — OXYCODONE AND ACETAMINOPHEN 5; 325 MG/1; MG/1
1 TABLET ORAL
Qty: 40 TAB | Refills: 0 | Status: SHIPPED | OUTPATIENT
Start: 2017-01-27 | End: 2017-02-08

## 2017-01-27 NOTE — PROGRESS NOTES
Patient: Timoteo Baptiste   Age: 70 y.o. Patient Care Team:  Ruthy Bolivar MD as PCP - General (Family Practice)  Garcia Kerr MD as Physician (Cardiology)  Manny Desai MD as Surgeon (Cardiothoracic Surgery)    Diagnosis: The encounter diagnosis was S/P CABG x 1. Problem List:   Patient Active Problem List   Diagnosis Code    HTN (hypertension), benign I10    Coronary atherosclerosis of native coronary artery I25.10    Type [facility] diabetes mellitus without complication [facility] A74.8    FUCHS (dyspnea on exertion) R06.09    CAD (coronary artery disease) I25.10    S/P CABG x 1 Z95.1    Postoperative anemia due to acute blood loss D62        Date of Surgery: 1-20-17    Surgery: Robotic SABINE takedown and LIMA to LAD    HPI: Patient is here for one week follow up. Has some incisional pain. He is walking. Occasional shortness of breath with walking. Denies chest pain, shortness of breath lying flat, and weight gain. Current Medications:   Current Outpatient Prescriptions   Medication Sig Dispense Refill    LINAGLIPTIN/METFORMIN HCL (JENTADUETO PO) Take 2.5 mg by mouth two (2) times a day. Indications: Diabetes Mellitus      traMADol (ULTRAM) 50 mg tablet Take 1-2 Tabs by mouth every six (6) hours as needed. Max Daily Amount: 400 mg. 40 Tab 0    senna-docusate (PERICOLACE) 8.6-50 mg per tablet Take 1 Tab by mouth two (2) times a day. 30 Tab 0    ferrous sulfate 325 mg (65 mg iron) tablet Take 1 Tab by mouth two (2) times a day. 60 Tab 0    clopidogrel (PLAVIX) 75 mg tab Take 1 Tab by mouth daily. Indications: Thrombosis Prevention after PCI 30 Tab 11    amiodarone (PACERONE) 400 mg tablet Take 400 mg by mouth twice a day x 2 weeks. Then decrease to 400 mg by mouth daily x 2 weeks. Then your done with this med. 42 Tab 0    metoprolol tartrate (LOPRESSOR) 25 mg tablet Take 1 Tab by mouth every twelve (12) hours.  60 Tab 1    CRESTOR 10 mg tablet TAKE ONE TABLET BY MOUTH EVERY EVENING 30 Tab 5  tadalafil (CIALIS) 5 mg tablet Take 5 mg by mouth daily.  amLODIPine (NORVASC) 10 mg tablet Take 10 mg by mouth daily.  aspirin delayed-release 81 mg tablet Take  by mouth daily. Vitals: Blood pressure 122/68, pulse 62, temperature 98 °F (36.7 °C), temperature source Oral, resp. rate 16, height 6' 1\" (1.854 m), weight 248 lb 12.8 oz (112.9 kg), SpO2 96 %. Allergies: has No Known Allergies. Physical Exam:  Wounds: clean, dry, no drainage    Lungs: clear to auscultation bilaterally    Heart: regular rate and rhythm, S1, S2 normal, no murmur, click, rub or gallop    Extremities: 1-2+ Edema    Assessment/Plan:   1.  S/P CABG (Robotic SABINE takedown and LIMA to LAD, hybrid with PCI)  -On Plavix, ASA, statin, and BB  -Continue to increase ambulation  -F/u in 3 weeks    Rehab - Y  Walking: Y

## 2017-02-08 ENCOUNTER — HOSPITAL ENCOUNTER (OUTPATIENT)
Dept: CARDIAC REHAB | Age: 72
Discharge: HOME | End: 2017-02-08
Payer: MEDICARE

## 2017-02-08 VITALS — BODY MASS INDEX: 31.53 KG/M2 | WEIGHT: 239 LBS

## 2017-02-08 VITALS — WEIGHT: 239 LBS | HEIGHT: 73 IN | BODY MASS INDEX: 31.68 KG/M2

## 2017-02-08 PROCEDURE — 93798 PHYS/QHP OP CAR RHAB W/ECG: CPT

## 2017-02-08 NOTE — CARDIO/PULMONARY
Timoteo Baptiste  70 y.o. presented to cardiac wellness for orientation and exercise tolerance test today with a primary diagnosis of DAVINCI SABINE TAKEDOWN, LEFT ANTERIOR THORACOTOMY, OFF PUMP CORONARY ARTERY BYPASS GRAFTING X1 to his LAD 01/19/17 and PCI with stent to 1st diag on 01/23/17. His EF is 60%. Timoteo Baptiste has no previous cardiac history. Cardiac risk factors include dyslipidemia, diabetes mellitus, obesity, hypertension, stress, inactivity and these were reviewed with Mr. & Mrs. Kathy Bojorquez. Timoteo Baptiste lives with his wife and they have grown children. He is a clinical . PAULA-D, depression score, is 9 and this is considered normal.  Patient denied chest pain or SOB during 6 minute walk and was in SR 1 AVB without ectopy. Timoteo Baptiste will attend a 60 minute class once a week and exercise 2- 3 days a week in cardiac wellness.    Dhaval Garcia RN  2/8/2017

## 2017-02-13 ENCOUNTER — APPOINTMENT (OUTPATIENT)
Dept: CARDIAC REHAB | Age: 72
End: 2017-02-13
Payer: MEDICARE

## 2017-02-13 ENCOUNTER — HOSPITAL ENCOUNTER (OUTPATIENT)
Dept: CARDIAC REHAB | Age: 72
Discharge: HOME | End: 2017-02-13
Payer: MEDICARE

## 2017-02-13 VITALS — WEIGHT: 237 LBS | BODY MASS INDEX: 31.27 KG/M2

## 2017-02-13 PROCEDURE — 93798 PHYS/QHP OP CAR RHAB W/ECG: CPT

## 2017-02-14 ENCOUNTER — OFFICE VISIT (OUTPATIENT)
Dept: CARDIOTHORACIC SURGERY | Age: 72
End: 2017-02-14

## 2017-02-14 ENCOUNTER — HOSPITAL ENCOUNTER (OUTPATIENT)
Dept: GENERAL RADIOLOGY | Age: 72
Discharge: HOME | End: 2017-02-14
Payer: MEDICARE

## 2017-02-14 VITALS — HEART RATE: 64 BPM | OXYGEN SATURATION: 98 % | SYSTOLIC BLOOD PRESSURE: 104 MMHG | DIASTOLIC BLOOD PRESSURE: 64 MMHG

## 2017-02-14 DIAGNOSIS — Z95.1 S/P CABG X 1: Primary | ICD-10-CM

## 2017-02-14 DIAGNOSIS — Z95.1 S/P CABG X 1: ICD-10-CM

## 2017-02-14 PROCEDURE — 71020 XR CHEST PA LAT: CPT

## 2017-02-14 RX ORDER — POTASSIUM CHLORIDE 750 MG/1
20 TABLET, EXTENDED RELEASE ORAL DAILY
Qty: 14 TAB | Refills: 0 | Status: SHIPPED | OUTPATIENT
Start: 2017-02-14 | End: 2017-02-17 | Stop reason: SDUPTHER

## 2017-02-14 RX ORDER — FUROSEMIDE 40 MG/1
40 TABLET ORAL DAILY
Qty: 7 TAB | Refills: 0 | Status: SHIPPED | OUTPATIENT
Start: 2017-02-14 | End: 2017-02-17 | Stop reason: SDUPTHER

## 2017-02-14 NOTE — PROGRESS NOTES
Patient: Iban Curiel   Age: 70 y.o. Patient Care Team:  Pierce Flores MD as PCP - General (Family Practice)  Magda Fatima MD as Physician (Cardiology)  Onur Cortez MD as Surgeon (Cardiothoracic Surgery)    Diagnosis: The encounter diagnosis was S/P CABG x 1. Problem List:   Patient Active Problem List   Diagnosis Code    HTN (hypertension), benign I10    Coronary atherosclerosis of native coronary artery I25.10    Type [facility] U24.7    FUCHS (dyspnea on exertion) R06.09    CAD (coronary artery disease) I25.10    S/P CABG x 1 Z95.1    Postoperative anemia due to acute blood loss D62      HPI: Pt called with complaints of 3-4 days of generalized malaise and some vomiting last PM. He has had more coughing over last 2-3 days. Mild FUCHS, but no SOB at rest. PA/LAT CXR prior to visit. Current Medications:   Current Outpatient Prescriptions   Medication Sig Dispense Refill    furosemide (LASIX) 40 mg tablet Take 1 Tab by mouth daily for 7 days. 7 Tab 0    potassium chloride (K-DUR, KLOR-CON) 10 mEq tablet Take 2 Tabs by mouth daily for 7 days. Indications: HYPOKALEMIA PREVENTION 14 Tab 0    LINAGLIPTIN/METFORMIN HCL (JENTADUETO PO) Take 2.5 mg by mouth two (2) times a day. Indications: Diabetes Mellitus      senna-docusate (PERICOLACE) 8.6-50 mg per tablet Take 1 Tab by mouth two (2) times a day. 30 Tab 0    ferrous sulfate 325 mg (65 mg iron) tablet Take 1 Tab by mouth two (2) times a day. 60 Tab 0    clopidogrel (PLAVIX) 75 mg tab Take 1 Tab by mouth daily. Indications: Thrombosis Prevention after PCI 30 Tab 11    amiodarone (PACERONE) 400 mg tablet Take 400 mg by mouth twice a day x 2 weeks. Then decrease to 400 mg by mouth daily x 2 weeks. Then your done with this med. 42 Tab 0    metoprolol tartrate (LOPRESSOR) 25 mg tablet Take 1 Tab by mouth every twelve (12) hours.  60 Tab 1    CRESTOR 10 mg tablet TAKE ONE TABLET BY MOUTH EVERY EVENING 30 Tab 5    tadalafil (CIALIS) 5 mg tablet Take 5 mg by mouth daily.  amLODIPine (NORVASC) 10 mg tablet Take 10 mg by mouth daily.  aspirin delayed-release 81 mg tablet Take  by mouth daily.  traMADol (ULTRAM) 50 mg tablet Take 1-2 Tabs by mouth every six (6) hours as needed. Max Daily Amount: 400 mg. 40 Tab 0       Vitals: Blood pressure 104/64, pulse 64, SpO2 98 %. Allergies: has No Known Allergies. Physical Exam:  Wounds: clean, dry, no drainage    Lungs: decreased lt base    Heart: regular rate and rhythm, S1, S2 normal, no murmur, click, rub or gallop    Extremities: no edema    Assessment/Plan:   Symptomatic Lt Pleural Effusion- On Plavix, will try gentle diuresis with Lasix 40 every day X 7 days  Likely viral syndrome- increase PO intake, OK to use benadryl for sleep. Treat symptomatically.   Increase activity as tolerated  Call office if symptoms persist or worsen over next 1-2 days

## 2017-02-16 ENCOUNTER — APPOINTMENT (OUTPATIENT)
Dept: CARDIAC REHAB | Age: 72
End: 2017-02-16
Payer: MEDICARE

## 2017-02-17 ENCOUNTER — APPOINTMENT (OUTPATIENT)
Dept: CARDIAC REHAB | Age: 72
End: 2017-02-17
Payer: MEDICARE

## 2017-02-17 ENCOUNTER — OFFICE VISIT (OUTPATIENT)
Dept: CARDIOTHORACIC SURGERY | Age: 72
End: 2017-02-17

## 2017-02-17 VITALS
BODY MASS INDEX: 30.75 KG/M2 | HEART RATE: 85 BPM | HEIGHT: 73 IN | TEMPERATURE: 98.1 F | RESPIRATION RATE: 16 BRPM | DIASTOLIC BLOOD PRESSURE: 56 MMHG | OXYGEN SATURATION: 98 % | SYSTOLIC BLOOD PRESSURE: 100 MMHG | WEIGHT: 232 LBS

## 2017-02-17 DIAGNOSIS — Z95.1 S/P CABG X 1: Primary | ICD-10-CM

## 2017-02-17 RX ORDER — ACETAMINOPHEN 500 MG
1000 TABLET ORAL
COMMUNITY

## 2017-02-17 RX ORDER — POTASSIUM CHLORIDE 750 MG/1
40 TABLET, EXTENDED RELEASE ORAL DAILY
Qty: 68 TAB | Refills: 0 | Status: SHIPPED | OUTPATIENT
Start: 2017-02-17 | End: 2017-03-06

## 2017-02-17 RX ORDER — FUROSEMIDE 40 MG/1
40 TABLET ORAL 2 TIMES DAILY
Qty: 20 TAB | Refills: 0 | Status: SHIPPED | OUTPATIENT
Start: 2017-02-17 | End: 2017-03-23

## 2017-02-17 NOTE — PROGRESS NOTES
Patient: Adelia Reid   Age: 70 y.o. Patient Care Team:  Jennifer Engel MD as PCP - General (Family Practice)  Luther Allison MD as Physician (Cardiology)  Nathan Thomas MD as Surgeon (Cardiothoracic Surgery)    Diagnosis: The encounter diagnosis was S/P CABG x 1. Problem List:   Patient Active Problem List   Diagnosis Code    HTN (hypertension), benign I10    Coronary atherosclerosis of native coronary artery I25.10    Type 2 diabetes mellitus without complication ([facility] T83.6    FUCHS (dyspnea on exertion) R06.09    CAD (coronary artery disease) I25.10    S/P CABG x 1 Z95.1    Postoperative anemia due to acute blood loss D62        Date of Surgery: 1-20-17    Surgery: Robotic SABINE takedown and LIMA to LAD    HPI: Patient is here for follow up. Has had a dry cough for the past several days and low grade fever at home (100.9). Accompanied by some generalized malaise and body aches. Has taken mucinex for cough. Temp normal in office. Current Medications:   Current Outpatient Prescriptions   Medication Sig Dispense Refill    acetaminophen (TYLENOL EXTRA STRENGTH) 500 mg tablet Take 1,000 mg by mouth every six (6) hours as needed for Pain.  GUAIFENESIN/DEXTROMETHORPHAN (MUCINEX DM PO) Take  by mouth.  furosemide (LASIX) 40 mg tablet Take 1 Tab by mouth daily for 7 days. 7 Tab 0    potassium chloride (K-DUR, KLOR-CON) 10 mEq tablet Take 2 Tabs by mouth daily for 7 days. Indications: HYPOKALEMIA PREVENTION 14 Tab 0    LINAGLIPTIN/METFORMIN HCL (JENTADUETO PO) Take 2.5 mg by mouth two (2) times a day. Indications: Diabetes Mellitus      senna-docusate (PERICOLACE) 8.6-50 mg per tablet Take 1 Tab by mouth two (2) times a day. 30 Tab 0    clopidogrel (PLAVIX) 75 mg tab Take 1 Tab by mouth daily. Indications: Thrombosis Prevention after PCI 30 Tab 11    amiodarone (PACERONE) 400 mg tablet Take 400 mg by mouth twice a day x 2 weeks. Then decrease to 400 mg by mouth daily x 2 weeks. Then your done with this med. 42 Tab 0    metoprolol tartrate (LOPRESSOR) 25 mg tablet Take 1 Tab by mouth every twelve (12) hours. 60 Tab 1    CRESTOR 10 mg tablet TAKE ONE TABLET BY MOUTH EVERY EVENING 30 Tab 5    tadalafil (CIALIS) 5 mg tablet Take 5 mg by mouth daily.  amLODIPine (NORVASC) 10 mg tablet Take 10 mg by mouth daily.  aspirin delayed-release 81 mg tablet Take  by mouth daily.  traMADol (ULTRAM) 50 mg tablet Take 1-2 Tabs by mouth every six (6) hours as needed. Max Daily Amount: 400 mg. 40 Tab 0    ferrous sulfate 325 mg (65 mg iron) tablet Take 1 Tab by mouth two (2) times a day. 60 Tab 0       Vitals: Blood pressure 100/56, pulse 85, temperature 98.1 °F (36.7 °C), temperature source Oral, resp. rate 16, height 6' 1\" (1.854 m), weight 232 lb (105.2 kg), SpO2 98 %. Allergies: has No Known Allergies. Physical Exam:  Wounds: clean, dry, no drainage    Lungs: Diminished left base    Heart: regular rate and rhythm, S1, S2 normal, no murmur, click, rub or gallop    Extremities: Mild Edema    Assessment/Plan:   1. S/P CABG (Robotic SABINE takedown and LIMA to LAD, hybrid with PCI)  -Mild/moderate left sided pleural effusion, will increase lasix to BID x 3 days, then back to daily  -CXR prior to next office appointment   2. Cough (likely viral)  -May use robitussin DM, cough drops, or tessalon perles   -Tylenol for fever  -Call if symptoms persist, will try z pack.       Rehab - Y  Walking: Y

## 2017-02-20 ENCOUNTER — APPOINTMENT (OUTPATIENT)
Dept: CARDIAC REHAB | Age: 72
End: 2017-02-20
Payer: MEDICARE

## 2017-02-20 ENCOUNTER — TELEPHONE (OUTPATIENT)
Dept: CARDIAC REHAB | Age: 72
End: 2017-02-20

## 2017-02-23 ENCOUNTER — HOSPITAL ENCOUNTER (OUTPATIENT)
Dept: CARDIAC REHAB | Age: 72
Discharge: HOME | End: 2017-02-23
Payer: MEDICARE

## 2017-02-23 VITALS — WEIGHT: 232 LBS | BODY MASS INDEX: 30.61 KG/M2

## 2017-02-23 PROCEDURE — 93798 PHYS/QHP OP CAR RHAB W/ECG: CPT | Performed by: DIETITIAN, REGISTERED

## 2017-02-23 PROCEDURE — 93797 PHYS/QHP OP CAR RHAB WO ECG: CPT | Performed by: DIETITIAN, REGISTERED

## 2017-02-24 ENCOUNTER — HOSPITAL ENCOUNTER (OUTPATIENT)
Dept: CARDIAC REHAB | Age: 72
Discharge: HOME | End: 2017-02-24
Payer: MEDICARE

## 2017-02-24 VITALS — BODY MASS INDEX: 30.34 KG/M2 | WEIGHT: 230 LBS

## 2017-02-24 PROCEDURE — 93798 PHYS/QHP OP CAR RHAB W/ECG: CPT

## 2017-02-27 ENCOUNTER — HOSPITAL ENCOUNTER (OUTPATIENT)
Dept: CARDIAC REHAB | Age: 72
Discharge: HOME | End: 2017-02-27
Payer: MEDICARE

## 2017-02-27 ENCOUNTER — HOSPITAL ENCOUNTER (OUTPATIENT)
Dept: GENERAL RADIOLOGY | Age: 72
Discharge: HOME | End: 2017-02-27
Payer: MEDICARE

## 2017-02-27 ENCOUNTER — OFFICE VISIT (OUTPATIENT)
Dept: CARDIOTHORACIC SURGERY | Age: 72
End: 2017-02-27

## 2017-02-27 VITALS
HEIGHT: 73 IN | TEMPERATURE: 97.8 F | SYSTOLIC BLOOD PRESSURE: 114 MMHG | WEIGHT: 229 LBS | OXYGEN SATURATION: 98 % | DIASTOLIC BLOOD PRESSURE: 72 MMHG | BODY MASS INDEX: 30.35 KG/M2 | HEART RATE: 71 BPM | RESPIRATION RATE: 16 BRPM

## 2017-02-27 VITALS — BODY MASS INDEX: 30.21 KG/M2 | WEIGHT: 229 LBS

## 2017-02-27 DIAGNOSIS — Z95.1 S/P CABG X 1: Primary | ICD-10-CM

## 2017-02-27 DIAGNOSIS — Z95.1 S/P CABG X 1: ICD-10-CM

## 2017-02-27 PROCEDURE — 93798 PHYS/QHP OP CAR RHAB W/ECG: CPT

## 2017-02-27 NOTE — PROGRESS NOTES
Patient: Funmi Mccauley   Age: 70 y.o. Patient Care Team:  Isom Mortimer, MD as PCP - General (Family Practice)  Toy Orosco MD as Physician (Cardiology)  Crispin Powell MD as Surgeon (Cardiothoracic Surgery)    Diagnosis: The encounter diagnosis was S/P CABG x 1. Problem List:   Patient Active Problem List   Diagnosis Code    HTN (hypertension), benign I10    Coronary atherosclerosis of native coronary artery I25.10    Type 2 diabetes mellitus without complication (HCC) F84.2    FUCHS (dyspnea on exertion) R06.09    CAD (coronary artery disease) I25.10    S/P CABG x 1 Z95.1    Postoperative anemia due to acute blood loss D62      HPI: making good progress, feels better. Has started cardiac rehab. CXR prior to visit. Current Medications:   Current Outpatient Prescriptions   Medication Sig Dispense Refill    acetaminophen (TYLENOL EXTRA STRENGTH) 500 mg tablet Take 1,000 mg by mouth every six (6) hours as needed for Pain.  GUAIFENESIN/DEXTROMETHORPHAN (MUCINEX DM PO) Take  by mouth.  furosemide (LASIX) 40 mg tablet Take 1 Tab by mouth two (2) times a day. Take one tab twice daily x 3 days, then take one tab daily x 14 days (Patient taking differently: Take 40 mg by mouth daily. Take one tab twice daily x 3 days, then take one tab daily x 14 days) 20 Tab 0    potassium chloride (K-DUR, KLOR-CON) 10 mEq tablet Take 4 Tabs by mouth daily for 17 days. Indications: HYPOKALEMIA PREVENTION 68 Tab 0    LINAGLIPTIN/METFORMIN HCL (JENTADUETO PO) Take 2.5 mg by mouth two (2) times a day. Indications: Diabetes Mellitus      clopidogrel (PLAVIX) 75 mg tab Take 1 Tab by mouth daily. Indications: Thrombosis Prevention after PCI 30 Tab 11    metoprolol tartrate (LOPRESSOR) 25 mg tablet Take 1 Tab by mouth every twelve (12) hours. 60 Tab 1    CRESTOR 10 mg tablet TAKE ONE TABLET BY MOUTH EVERY EVENING 30 Tab 5    tadalafil (CIALIS) 5 mg tablet Take 5 mg by mouth daily.       amLODIPine (NORVASC) 10 mg tablet Take 10 mg by mouth daily.  aspirin delayed-release 81 mg tablet Take  by mouth daily.  traMADol (ULTRAM) 50 mg tablet Take 1-2 Tabs by mouth every six (6) hours as needed. Max Daily Amount: 400 mg. 40 Tab 0    senna-docusate (PERICOLACE) 8.6-50 mg per tablet Take 1 Tab by mouth two (2) times a day. 30 Tab 0    ferrous sulfate 325 mg (65 mg iron) tablet Take 1 Tab by mouth two (2) times a day. 60 Tab 0    amiodarone (PACERONE) 400 mg tablet Take 400 mg by mouth twice a day x 2 weeks. Then decrease to 400 mg by mouth daily x 2 weeks. Then your done with this med. 42 Tab 0       Vitals: Blood pressure 114/72, pulse 71, temperature 97.8 °F (36.6 °C), temperature source Oral, resp. rate 16, height 6' 1\" (1.854 m), weight 229 lb (103.9 kg), SpO2 98 %. Allergies: has No Known Allergies. Physical Exam:  Wounds: clean, dry, no drainage, healed    Lungs: clear to auscultation bilaterally    Heart: regular rate and rhythm, S1, S2 normal, no murmur, click, rub or gallop    Extremities: no edema    Assessment/Plan:   Making progress, looks good. Continue Lasix until complete. Continue cardiac rehab. Follow up with Dr Mateo Le.

## 2017-02-28 NOTE — PROGRESS NOTES
..Patient seen and documentation reviewed  Doing well without specific complaints  I reviewed post op recommendations and importance of cardiology and PCP follow up  Will call office with any further questions

## 2017-03-02 ENCOUNTER — APPOINTMENT (OUTPATIENT)
Dept: CARDIAC REHAB | Age: 72
End: 2017-03-02
Payer: MEDICARE

## 2017-03-02 ENCOUNTER — TELEPHONE (OUTPATIENT)
Dept: CARDIAC REHAB | Age: 72
End: 2017-03-02

## 2017-03-02 NOTE — TELEPHONE ENCOUNTER
Shelbi Dubon called P this morning; he will be absent from cardiac rehab today. He plans to return tomorrow, 3/3/17.   Yohana Rebolledo RN

## 2017-03-03 ENCOUNTER — HOSPITAL ENCOUNTER (OUTPATIENT)
Dept: CARDIAC REHAB | Age: 72
Discharge: HOME | End: 2017-03-03
Payer: MEDICARE

## 2017-03-03 VITALS — WEIGHT: 230 LBS | BODY MASS INDEX: 30.34 KG/M2

## 2017-03-03 PROCEDURE — 93798 PHYS/QHP OP CAR RHAB W/ECG: CPT

## 2017-03-17 ENCOUNTER — OFFICE VISIT (OUTPATIENT)
Dept: CARDIOLOGY CLINIC | Age: 72
End: 2017-03-17

## 2017-03-17 VITALS
DIASTOLIC BLOOD PRESSURE: 72 MMHG | HEIGHT: 73 IN | SYSTOLIC BLOOD PRESSURE: 146 MMHG | BODY MASS INDEX: 30.62 KG/M2 | WEIGHT: 231 LBS | HEART RATE: 82 BPM

## 2017-03-17 DIAGNOSIS — I10 HTN (HYPERTENSION), BENIGN: ICD-10-CM

## 2017-03-17 DIAGNOSIS — E11.9 TYPE 2 DIABETES MELLITUS WITHOUT COMPLICATION, WITHOUT LONG-TERM CURRENT USE OF INSULIN (HCC): ICD-10-CM

## 2017-03-17 DIAGNOSIS — Z95.1 S/P CABG X 1: ICD-10-CM

## 2017-03-17 DIAGNOSIS — E78.5 DYSLIPIDEMIA: ICD-10-CM

## 2017-03-17 DIAGNOSIS — I25.10 CORONARY ARTERY DISEASE INVOLVING NATIVE CORONARY ARTERY OF NATIVE HEART WITHOUT ANGINA PECTORIS: Primary | ICD-10-CM

## 2017-03-17 PROBLEM — D62 POSTOPERATIVE ANEMIA DUE TO ACUTE BLOOD LOSS: Status: RESOLVED | Noted: 2017-01-20 | Resolved: 2017-03-17

## 2017-03-17 NOTE — PATIENT INSTRUCTIONS
Get back into cardiac rehab and continue on the road to recovery. Stay active with the knowledge that they teach you in rehab. Continue with your new dietary changed. Eating cleanly - try to avoid processed and packaged foods. Eat mostly plant based foods, avoid refined carbs and if you choose to eat meat know where it comes from (organic, avoid hormones and antibiotics fed animals/fish). Research Sandra Ritchie's impression into diet. Continue Aspirin and Plavix in addition to your blood pressure and cholesterol medications. Continue to follow up with Dr. Benancio Landau for routine lipid checks every 6 months.

## 2017-03-17 NOTE — MR AVS SNAPSHOT
Visit Information Date & Time Provider Department Dept. Phone Encounter #  
 3/17/2017  2:20 PM Ashleigh Villegas MD [facility] Shaileshestee Sandhu [phone] [id number] Follow-up Instructions Return in about 3 months (around 6/17/2017). Upcoming Health Maintenance Date Due Hepatitis C Screening 1945 FOOT EXAM Q1 3/18/1955 EYE EXAM RETINAL OR DILATED Q1 3/18/1955 DTaP/Tdap/Td series (1 - Tdap) 3/18/1966 FOBT Q 1 YEAR AGE 50-75 3/18/1995 ZOSTER VACCINE AGE 60> 3/18/2005 GLAUCOMA SCREENING Q2Y 3/18/2010 Pneumococcal 65+ Low/Medium Risk (1 of 2 - PCV13) 3/18/2010 MEDICARE YEARLY EXAM 3/18/2010 HEMOGLOBIN A1C Q6M 7/12/2017 LIPID PANEL Q1 8/8/2017 MICROALBUMIN Q1 11/22/2017 Allergies as of 3/17/2017  Review Complete On: 3/17/2017 By: Sharon Viera NP No Known Allergies Current Immunizations  Reviewed on 2/8/2017 Name Date Influenza Vaccine 11/23/2016 Not reviewed this visit You Were Diagnosed With   
  
 Codes Comments Coronary artery disease involving native coronary artery of native heart without angina pectoris    -  Primary ICD-10-CM: I25.10 ICD-9-CM: 414.01   
 HTN (hypertension), benign     ICD-10-CM: I10 
ICD-9-CM: 401.1 Type 2 diabetes mellitus without complication, without long-term current use of insulin (HCC)     ICD-10-CM: E11.9 ICD-9-CM: 250.00 S/P CABG x 1     ICD-10-CM: Z95.1 ICD-9-CM: V45.81 Dyslipidemia     ICD-10-CM: E78.5 ICD-9-CM: 272.4 Vitals BP Pulse Height(growth percentile) Weight(growth percentile) BMI Smoking Status 146/72 82 6' 1\" (1.854 m) 231 lb (104.8 kg) 30.48 kg/m2 Former Smoker Vitals History BMI and BSA Data Body Mass Index Body Surface Area  
 30.48 kg/m 2 2.32 m 2 Preferred Pharmacy Pharmacy Name Phone [facility], [address] [place] [address] [phone] Your Updated Medication List  
  
 This list is accurate as of: 3/17/17  3:12 PM.  Always use your most recent med list. amLODIPine 10 mg tablet Commonly known as:  Melissa Fallon Take 10 mg by mouth daily. aspirin delayed-release 81 mg tablet Take  by mouth daily. CIALIS 5 mg tablet Generic drug:  tadalafil Take 5 mg by mouth daily. clopidogrel 75 mg Tab Commonly known as:  PLAVIX Take 1 Tab by mouth daily. Indications: Thrombosis Prevention after PCI  
  
 CRESTOR 10 mg tablet Generic drug:  rosuvastatin TAKE ONE TABLET BY MOUTH EVERY EVENING  
  
 furosemide 40 mg tablet Commonly known as:  LASIX Take 1 Tab by mouth two (2) times a day. Take one tab twice daily x 3 days, then take one tab daily x 14 days JENTADUETO PO Take 2.5 mg by mouth two (2) times a day. Indications: Diabetes Mellitus  
  
 metoprolol tartrate 25 mg tablet Commonly known as:  LOPRESSOR Take 1 Tab by mouth every twelve (12) hours. MUCINEX DM PO Take  by mouth. senna-docusate 8.6-50 mg per tablet Commonly known as:  Fleurette Drafts Take 1 Tab by mouth two (2) times a day. TYLENOL EXTRA STRENGTH 500 mg tablet Generic drug:  acetaminophen Take 1,000 mg by mouth every six (6) hours as needed for Pain. We Performed the Following AMB POC EKG ROUTINE W/ 12 LEADS, INTER & REP [80544 CPT(R)] Follow-up Instructions Return in about 3 months (around 6/17/2017). Patient Instructions Get back into cardiac rehab and continue on the road to recovery. Stay active with the knowledge that they teach you in rehab. Continue with your new dietary changed. Eating cleanly - try to avoid processed and packaged foods. Eat mostly plant based foods, avoid refined carbs and if you choose to eat meat know where it comes from (organic, avoid hormones and antibiotics fed animals/fish). Research Gina Ritchie's impression into diet. Continue Aspirin and Plavix in addition to your blood pressure and cholesterol medications. Continue to follow up with Dr. Susanne Abdullahi for routine lipid checks every 6 months. Introducing [facility] SERVICES! Yesika Villar introduces [employer] patient portal. Now you can access parts of your medical record, email your doctor's office, and request medication refills online. 1. In your internet browser, go to [email or web address] 2. Click on the First Time User? Click Here link in the Sign In box. You will see the New Member Sign Up page. 3. Enter your [employer] Access Code exactly as it appears below. You will not need to use this code after youve completed the sign-up process. If you do not sign up before the expiration date, you must request a new code. · [employer] Access Code: [email or web address] Expires: 4/11/2017 10:03 AM 
 
4. Enter the last four digits of your Social Security Number (xxxx) and Date of Birth (mm/dd/yyyy) as indicated and click Submit. You will be taken to the next sign-up page. 5. Create a [employer] ID. This will be your [employer] login ID and cannot be changed, so think of one that is secure and easy to remember. 6. Create a [employer] password. You can change your password at any time. 7. Enter your Password Reset Question and Answer. This can be used at a later time if you forget your password. 8. Enter your e-mail address. You will receive e-mail notification when new information is available in [address]. 9. Click Sign Up. You can now view and download portions of your medical record. 10. Click the Download Summary menu link to download a portable copy of your medical information. If you have questions, please visit the Frequently Asked Questions section of the [employer] website. Remember, [employer] is NOT to be used for urgent needs. For medical emergencies, dial 911. Now available from your iPhone and Android! Please provide this summary of care documentation to your next provider. Your primary care clinician is listed as Philip 116. If you have any questions after today's visit, please call [phone].

## 2017-03-17 NOTE — PROGRESS NOTES
[id number] [place] [facility], [address] [facility]    Office [phone]  Fax [phone]       Oksana Ruiz is a 70 y.o. male. Last seen 6 months ago. s/p robotic assisted CABG x 1 (LIMA to LAD) performed by Dr. Tiffany Martell on 1/19/17 and PCI/JT to 1st diag on 1/23/17 by Dr. Halina Shane. Assessment  Encounter Diagnoses   Name Primary?  Coronary artery disease involving native coronary artery of native heart without angina pectoris Yes    HTN (hypertension), benign     Type 2 diabetes mellitus without complication, without long-term current use of insulin (HCC)     S/P CABG x 1     Dyslipidemia        Recommendations:  Mr. Sravanthi Conteh has CAD now s/p hybrid CABG/PCI 1/2017. He denies any exertional anginal symptoms. Fatigue gradually improving (likely partially due to post op anemia with Hgb down to ~8). Encouraged him to resume rehab as planned. He remains at risk for CAD progression in the setting of poorly controlled DM, however he has made some positive changes since bypass surgery. Encouraged his recent dietary changes resulting in improved glucose control. Continue ASA and Plavix. Hypertensive in the office today, however SBP 's per home BP log. Continue current medication regimen and continue to monitor at home. Hx of dyslipidemia on Crestor 10 mg daily. Lipids and DM  followed by Dr. Destini Jean. The patient was encouraged to continue current medications, exercise, lose weight and call with any new complaints or concerns. Follow-up Disposition:  Return in about 3 months (around 6/17/2017). Subjective:  Mr. Sravanthi Conteh reports having a slow recovery following bypass. He reports recurrent symptoms consistent with the flu. He reports his illness has limited his ability to engage in cardiac rehab sessions. He reports that he has been in good health for 1 week and plans to resume rehab next week. He has gone back to work part time this week.  His overall level of fatigue has improved. He is no longer napping during the day. He denies any exertional anginal or dyspnea. He denies any palpitations or tachycardia. No syncope or near syncope. His last dose of Amiodarone was given on February 21st, per his wife's records. He continues to take Lasix 40 mg daily; stop date is within 1 week, to address persistent left lower lobe effusion. He denies any orthopnea, edema or paroxysmal nocturnal dyspnea. Home BP monitoring since hospital discharge show SBP 98 to 130's. Blood sugar control has improved; A1c by lab draw on 2/22/17 with Dr. Ofe Castro has improved from 7.6% down to 6.2%. He is here with his wife today. Cardiac risk factors   HTN yes  DM yes    Cardiac testing  Cath 5/21/2004 - EF 60%, mild to moderate coronary calcification, mild diffuse plaque     Exercise cardiolite 6/2011 - normal perfusion, EF 50%    Echo at [facility] 12/18/14 - EF 60-65%, mild LAE,     Stress echo 2/18/15 - 6 min 30 sec, 2-3 mm ST depression but normal stress echo study, resting EF 60%    Exercise cardiolite 10/17/16 - 5 min, mild reversible defects - distal anterior, distal inferior and apex. EF 53%, mild LV dilatation with stress    Cath 11/8/2016 - EDP 17, EF 60%, no AV gradient, mod-severe cor Ca2+, LM normal, LAD mid diffuse disease (70%), D1 focal 85% mid, LCX mild plaque, RCA large dom, mid 50%. Past Medical History:   Diagnosis Date    CAD (coronary artery disease) 01/23/2017    stent    DJD (degenerative joint disease) of knee 11/2013    left TKR    ED (erectile dysfunction)     Essential hypertension     Hyperlipidemia     T2DM (type 2 diabetes mellitus) (HCC)     Thalassemia minor       Current Outpatient Prescriptions   Medication Sig Dispense Refill    acetaminophen (TYLENOL EXTRA STRENGTH) 500 mg tablet Take 1,000 mg by mouth every six (6) hours as needed for Pain.  GUAIFENESIN/DEXTROMETHORPHAN (MUCINEX DM PO) Take  by mouth.       furosemide (LASIX) 40 mg tablet Take 1 Tab by mouth two (2) times a day. Take one tab twice daily x 3 days, then take one tab daily x 14 days (Patient taking differently: Take 40 mg by mouth daily. Take one tab twice daily x 3 days, then take one tab daily x 14 days) 20 Tab 0    LINAGLIPTIN/METFORMIN HCL (JENTADUETO PO) Take 2.5 mg by mouth two (2) times a day. Indications: Diabetes Mellitus      clopidogrel (PLAVIX) 75 mg tab Take 1 Tab by mouth daily. Indications: Thrombosis Prevention after PCI 30 Tab 11    metoprolol tartrate (LOPRESSOR) 25 mg tablet Take 1 Tab by mouth every twelve (12) hours. 60 Tab 1    CRESTOR 10 mg tablet TAKE ONE TABLET BY MOUTH EVERY EVENING 30 Tab 5    tadalafil (CIALIS) 5 mg tablet Take 5 mg by mouth daily.  amLODIPine (NORVASC) 10 mg tablet Take 10 mg by mouth daily.  aspirin delayed-release 81 mg tablet Take  by mouth daily.  senna-docusate (PERICOLACE) 8.6-50 mg per tablet Take 1 Tab by mouth two (2) times a day. 30 Tab 0     No Known Allergies     . Review of Systems  Constitutional: Negative for fever, chills, and diaphoresis. Positive for fatigue, improving. Respiratory: Negative for cough, hemoptysis, sputum production, and wheezing. Cardiovascular: Negative for chest pain, palpitations, orthopnea, claudication, and PND. Gastrointestinal: Negative for heartburn, nausea, vomiting, blood in stool and melena. Genitourinary: Negative for dysuria and flank pain. Musculoskeletal: Negative for joint pain and back pain. Skin: Negative for rash. Neurological: Negative for focal weakness, seizures, loss of consciousness, weakness and headaches. Endo/Heme/Allergies: Does not bruise/bleed easily. Psychiatric/Behavioral: Negative for memory loss. The patient does not have insomnia.       Physical Exam  Visit Vitals    /72    Pulse 82    Ht 6' 1\" (1.854 m)    Wt 231 lb (104.8 kg)    BMI 30.48 kg/m2     Wt Readings from Last 3 Encounters:   03/17/17 231 lb (104.8 kg) 03/03/17 230 lb (104.3 kg)   02/27/17 229 lb (103.9 kg)      General - well developed well nourished, slightly disheveled appearance  Neck - JVP normal, thyroid nl  Cardiac - normal S1,S2, no murmurs, rubs or gallops. No clicks  Vascular - carotids without bruits, radials, femorals and pedal pulses equal bilateral  Lungs - clear to auscultation bilaterals, no rales ,wheezing or rhonchi. Decreased breath sound left base. Abd - soft nontender, no HSM, no abd bruits  Extremities - trace ankle edema.    Skin - no rash  Neuro - nonfocal  Psych - normal mood and affect    Cardiographics  ECG 2/2/15 - SR, interatrial conduction delay  EKG 3/23/16 - SR 66  EKG 3/17/17 - SR 82, first degree AV block     IGOR Ruano MD

## 2017-03-20 ENCOUNTER — TELEPHONE (OUTPATIENT)
Dept: CARDIAC REHAB | Age: 72
End: 2017-03-20

## 2017-03-20 NOTE — TELEPHONE ENCOUNTER
Mr. Julieth Duran has been sick and will not be able to come to cardiac rehab today.   He plans to return this Thursday

## 2017-03-23 ENCOUNTER — HOSPITAL ENCOUNTER (OUTPATIENT)
Dept: CARDIAC REHAB | Age: 72
Discharge: HOME | End: 2017-03-23
Payer: MEDICARE

## 2017-03-23 VITALS — WEIGHT: 232 LBS | BODY MASS INDEX: 30.61 KG/M2

## 2017-03-23 PROCEDURE — 93797 PHYS/QHP OP CAR RHAB WO ECG: CPT

## 2017-03-23 PROCEDURE — 93798 PHYS/QHP OP CAR RHAB W/ECG: CPT

## 2017-03-24 ENCOUNTER — HOSPITAL ENCOUNTER (OUTPATIENT)
Dept: CARDIAC REHAB | Age: 72
Discharge: HOME | End: 2017-03-24
Payer: MEDICARE

## 2017-03-24 VITALS — BODY MASS INDEX: 30.48 KG/M2 | WEIGHT: 231 LBS

## 2017-03-24 PROCEDURE — 93798 PHYS/QHP OP CAR RHAB W/ECG: CPT

## 2017-03-27 ENCOUNTER — HOSPITAL ENCOUNTER (OUTPATIENT)
Dept: CARDIAC REHAB | Age: 72
Discharge: HOME | End: 2017-03-27
Payer: MEDICARE

## 2017-03-27 VITALS — WEIGHT: 231 LBS | BODY MASS INDEX: 30.48 KG/M2

## 2017-03-27 PROCEDURE — 93798 PHYS/QHP OP CAR RHAB W/ECG: CPT

## 2017-03-31 ENCOUNTER — HOSPITAL ENCOUNTER (OUTPATIENT)
Dept: CARDIAC REHAB | Age: 72
Discharge: HOME | End: 2017-03-31
Payer: MEDICARE

## 2017-03-31 VITALS — WEIGHT: 229 LBS | BODY MASS INDEX: 30.21 KG/M2

## 2017-03-31 PROCEDURE — 93798 PHYS/QHP OP CAR RHAB W/ECG: CPT

## 2017-05-03 ENCOUNTER — TELEPHONE (OUTPATIENT)
Dept: CARDIAC REHAB | Age: 72
End: 2017-05-03

## 2017-05-03 NOTE — TELEPHONE ENCOUNTER
5/3/2017: Cardiac Wellness: Dedra Line regarding absence from the Cardiac Wellness Program. Patient wants me to call back next week, Monday May 8, 2017 @ 11:00am to schedule an appointment. Patient states \"it has been hectic around here\".   Festus Ferguson

## 2017-05-11 NOTE — CARDIO/PULMONARY
Lacy Self  67 y.o. With diagnosis of a CABG (1/19/17), Mr. Nkechi Navarro attended phase II cardiac rehab for 10 exercise sessions from 2/8/17 - 3/31/17. Called pt and LM on 5/3/17 to follow up on pt's absence from program. LM again on 5/9/17. No return phone call, pt has not been in since 3/31/17, will discharge at this time.     Yue Martinez RN  5/11/2017

## 2017-06-26 ENCOUNTER — OFFICE VISIT (OUTPATIENT)
Dept: CARDIOLOGY CLINIC | Age: 72
End: 2017-06-26

## 2017-06-26 VITALS
HEIGHT: 73 IN | SYSTOLIC BLOOD PRESSURE: 128 MMHG | WEIGHT: 224.8 LBS | DIASTOLIC BLOOD PRESSURE: 80 MMHG | HEART RATE: 86 BPM | BODY MASS INDEX: 29.79 KG/M2 | RESPIRATION RATE: 16 BRPM | OXYGEN SATURATION: 99 %

## 2017-06-26 DIAGNOSIS — E11.9 TYPE 2 DIABETES MELLITUS WITHOUT COMPLICATION, WITHOUT LONG-TERM CURRENT USE OF INSULIN (HCC): ICD-10-CM

## 2017-06-26 DIAGNOSIS — E78.5 DYSLIPIDEMIA: ICD-10-CM

## 2017-06-26 DIAGNOSIS — I10 HTN (HYPERTENSION), BENIGN: ICD-10-CM

## 2017-06-26 DIAGNOSIS — Z95.1 S/P CABG X 1: ICD-10-CM

## 2017-06-26 DIAGNOSIS — I25.10 CORONARY ARTERY DISEASE INVOLVING NATIVE CORONARY ARTERY OF NATIVE HEART WITHOUT ANGINA PECTORIS: Primary | ICD-10-CM

## 2017-06-26 NOTE — PROGRESS NOTES
[id number] [place], [address] [facility]    Office [phone]  Fax [phone]       Clementina Giordano is a 67 y.o. male. Last seen 3 months ago. Assessment  Encounter Diagnoses   Name Primary?  HTN (hypertension), benign Yes    Coronary artery disease involving native coronary artery of native heart without angina pectoris     Type 2 diabetes mellitus without complication, without long-term current use of insulin ([facility])     S/P CABG x 1     Dyslipidemia        Recommendations:    Mr. Nita Hightower has CAD s/p hybrid 1V CABG (off pump LIMA LAD), PCI D1 with JT January 2017. He has no exertional symptoms however his presurgical symptoms were essentially fatigue but no chest pain. Will schedule exercise cardiolite in 6 months on the anniversary of his surgery. Continue DAPT until then. Drivers of CAD progression include T2DM and HTN. Normotensive on current treatment. Lipids and DM managed by Dr. Obinna Bahena. Follow-up Disposition: Not on File   Exercise cardiolite near future    Subjective:    Mr. Nita Hightower is doing well with no major interval issues. He is normotensive at home. Adherent with BP medications. Denies any lightheadedness or dizziness. His weights have been stable in the 224-230lbs. He is mindful of his diet and watches his portion sizes. Patient denies any exertional chest pain, dyspnea, palpitations, syncope, orthopnea, edema or paroxysmal nocturnal dyspnea. Last A1C couple months ago was 5.6%. He is under some stress with selling 2 houses and building another house. He's been doing a lot of heavy lifting.        Cardiac risk factors   HTN yes  DM yes    Cardiac testing  Cath 5/21/2004 - EF 60%, mild to moderate coronary calcification, mild diffuse plaque     Exercise cardiolite 6/2011 - normal perfusion, EF 50%    Echo at [facility] 12/18/14 - EF 60-65%, mild LAE,     Stress echo 2/18/15 - 6 min 30 sec, 2-3 mm ST depression but normal stress echo study, resting EF 60%    Exercise cardiolite 10/17/16 - 5 min, mild reversible defects - distal anterior, distal inferior and apex. EF 53%, mild LV dilatation with stress    Cath 11/8/2016 - EDP 17, EF 60%, no AV gradient, mod-severe cor Ca2+, LM normal, LAD mid diffuse disease (70%), D1 focal 85% mid, LCX mild plaque, RCA large dom, mid 50%. Past Medical History:   Diagnosis Date    CAD (coronary artery disease) 01/23/2017    stent    DJD (degenerative joint disease) of knee 11/2013    left TKR    ED (erectile dysfunction)     Essential hypertension     Hyperlipidemia     T2DM (type 2 diabetes mellitus) ([facility])     Thalassemia minor         Current Outpatient Prescriptions   Medication Sig Dispense Refill    LINAGLIPTIN/METFORMIN HCL (JENTADUETO PO) Take 2.5 mg by mouth two (2) times a day. Indications: Diabetes Mellitus      clopidogrel (PLAVIX) 75 mg tab Take 1 Tab by mouth daily. Indications: Thrombosis Prevention after PCI 30 Tab 11    CRESTOR 10 mg tablet TAKE ONE TABLET BY MOUTH EVERY EVENING 30 Tab 5    tadalafil (CIALIS) 5 mg tablet Take 5 mg by mouth daily.  amLODIPine (NORVASC) 10 mg tablet Take 10 mg by mouth daily.  aspirin delayed-release 81 mg tablet Take  by mouth daily.  acetaminophen (TYLENOL EXTRA STRENGTH) 500 mg tablet Take 1,000 mg by mouth every six (6) hours as needed for Pain. No Known Allergies     . Review of Systems  Constitutional: Negative for fever, chills, and diaphoresis. Positive for fatigue. Respiratory: Negative for cough, hemoptysis, sputum production, and wheezing. Cardiovascular: Negative for chest pain, palpitations, orthopnea, claudication, leg swelling and PND. Gastrointestinal: Negative for heartburn, nausea, vomiting, blood in stool and melena. Genitourinary: Negative for dysuria and flank pain. Musculoskeletal: Negative for joint pain and back pain. Skin: Negative for rash.    Neurological: Negative for focal weakness, seizures, loss of consciousness, weakness and headaches. Endo/Heme/Allergies: Does not bruise/bleed easily. Psychiatric/Behavioral: Negative for memory loss. The patient does not have insomnia. Physical Exam  Visit Vitals    /80 (BP 1 Location: Left arm, BP Patient Position: Sitting)    Pulse 86    Resp 16    Ht 6' 1\" (1.854 m)    Wt 224 lb 12.8 oz (102 kg)    SpO2 99%    BMI 29.66 kg/m2     Wt Readings from Last 3 Encounters:   06/26/17 224 lb 12.8 oz (102 kg)   03/31/17 229 lb (103.9 kg)   03/27/17 231 lb (104.8 kg)      General - well developed well nourished, slightly disheveled appearance  Neck - JVP normal, thyroid nl  Cardiac - normal S1,S2, no murmurs, rubs or gallops.  No clicks  Vascular - carotids without bruits, radials, femorals and pedal pulses equal bilateral  Lungs - clear to auscultation bilaterals, no rales ,wheezing or rhonchi  Abd - soft nontender, no HSM, no abd bruits  Extremities - no edema   Skin - no rash  Neuro - nonfocal  Psych - normal mood and affect    Cardiographics  ECG 2/2/15 - SR, interatrial conduction delay  EKG 3/23/16 - SR 66  Labs 8/8/16 - glucose 153, LDL-p 962, LDL-c 64, HDL-c 31, small LDL-p 786    Written by Etelvina Logan, as dictated by Julissa Maria MD.   Julissa Maria MD

## 2017-06-26 NOTE — MR AVS SNAPSHOT
Visit Information Date & Time Provider Department Dept. Phone Encounter #  
 6/26/2017  4:00 PM Caitlyn Bryan MD [facility] En[facility] [phone] [id number] Follow-up Instructions Return in about 6 months (around 12/26/2017). Upcoming Health Maintenance Date Due Hepatitis C Screening 1945 FOOT EXAM Q1 3/18/1955 EYE EXAM RETINAL OR DILATED Q1 3/18/1955 DTaP/Tdap/Td series (1 - Tdap) 3/18/1966 FOBT Q 1 YEAR AGE 50-75 3/18/1995 ZOSTER VACCINE AGE 60> 3/18/2005 GLAUCOMA SCREENING Q2Y 3/18/2010 Pneumococcal 65+ Low/Medium Risk (1 of 2 - PCV13) 3/18/2010 MEDICARE YEARLY EXAM 3/18/2010 HEMOGLOBIN A1C Q6M 7/12/2017 INFLUENZA AGE 9 TO ADULT 8/1/2017 MICROALBUMIN Q1 11/22/2017 LIPID PANEL Q1 11/22/2017 Allergies as of 6/26/2017  Review Complete On: 6/26/2017 By: Carson Navarro LPN No Known Allergies Current Immunizations  Reviewed on 2/8/2017 Name Date Influenza Vaccine 11/23/2016 Not reviewed this visit You Were Diagnosed With   
  
 Codes Comments HTN (hypertension), benign    -  Primary ICD-10-CM: I10 
ICD-9-CM: 401.1 Coronary artery disease involving native coronary artery of native heart without angina pectoris     ICD-10-CM: I25.10 ICD-9-CM: 414.01 Type 2 diabetes mellitus without complication, without long-term current use of insulin (HCC)     ICD-10-CM: E11.9 ICD-9-CM: 250.00 S/P CABG x 1     ICD-10-CM: Z95.1 ICD-9-CM: V45.81 Dyslipidemia     ICD-10-CM: E78.5 ICD-9-CM: 272.4 Vitals BP Pulse Resp Height(growth percentile) Weight(growth percentile) SpO2  
 128/80 (BP 1 Location: Left arm, BP Patient Position: Sitting) 86 16 6' 1\" (1.854 m) 224 lb 12.8 oz (102 kg) 99% BMI Smoking Status 29.66 kg/m2 Former Smoker BMI and BSA Data Body Mass Index Body Surface Area  
 29.66 kg/m 2 2.29 m 2 Preferred Pharmacy Pharmacy Name Phone [facility] [address] [place] [address], [address] [place] [address] [facility] [phone] Your Updated Medication List  
  
   
This list is accurate as of: 6/26/17  4:12 PM.  Always use your most recent med list. amLODIPine 10 mg tablet Commonly known as:  Elen Rafter Take 10 mg by mouth daily. aspirin delayed-release 81 mg tablet Take  by mouth daily. CIALIS 5 mg tablet Generic drug:  tadalafil Take 5 mg by mouth daily. clopidogrel 75 mg Tab Commonly known as:  PLAVIX Take 1 Tab by mouth daily. Indications: Thrombosis Prevention after PCI  
  
 CRESTOR 10 mg tablet Generic drug:  rosuvastatin TAKE ONE TABLET BY MOUTH EVERY EVENING  
  
 JENTADUETO PO Take 2.5 mg by mouth two (2) times a day. Indications: Diabetes Mellitus TYLENOL EXTRA STRENGTH 500 mg tablet Generic drug:  acetaminophen Take 1,000 mg by mouth every six (6) hours as needed for Pain. Follow-up Instructions Return in about 6 months (around 12/26/2017). Introducing hospitals & HEALTH SERVICES! [facility] introduces [employer] patient portal. Now you can access parts of your medical record, email your doctor's office, and request medication refills online. 1. In your internet browser, go to [email or web address] 2. Click on the First Time User? Click Here link in the Sign In box. You will see the New Member Sign Up page. 3. Enter your [employer] Access Code exactly as it appears below. You will not need to use this code after youve completed the sign-up process. If you do not sign up before the expiration date, you must request a new code. · [employer] Access Code: [email or web address] Expires: 8/1/2017 12:00 PM 
 
4. Enter the last four digits of your Social Security Number (xxxx) and Date of Birth (mm/dd/yyyy) as indicated and click Submit. You will be taken to the next sign-up page. 5. Create a [employer] ID. This will be your [employer] login ID and cannot be changed, so think of one that is secure and easy to remember. 6. Create a [employer] password. You can change your password at any time. 7. Enter your Password Reset Question and Answer. This can be used at a later time if you forget your password. 8. Enter your e-mail address. You will receive e-mail notification when new information is available in [address] 9. Click Sign Up. You can now view and download portions of your medical record. 10. Click the Download Summary menu link to download a portable copy of your medical information. If you have questions, please visit the Frequently Asked Questions section of the [employer] website. Remember, [employer] is NOT to be used for urgent needs. For medical emergencies, dial 911. Now available from your iPhone and Android! Please provide this summary of care documentation to your next provider. Your primary care clinician is listed as Philip 116. If you have any questions after today's visit, please call [phone].

## 2017-12-22 ENCOUNTER — OFFICE VISIT (OUTPATIENT)
Dept: CARDIOLOGY CLINIC | Age: 72
End: 2017-12-22

## 2017-12-22 ENCOUNTER — CLINICAL SUPPORT (OUTPATIENT)
Dept: CARDIOLOGY CLINIC | Age: 72
End: 2017-12-22

## 2017-12-22 VITALS
WEIGHT: 239.6 LBS | HEIGHT: 73 IN | BODY MASS INDEX: 31.75 KG/M2 | RESPIRATION RATE: 16 BRPM | HEART RATE: 70 BPM | SYSTOLIC BLOOD PRESSURE: 140 MMHG | OXYGEN SATURATION: 97 % | DIASTOLIC BLOOD PRESSURE: 80 MMHG

## 2017-12-22 DIAGNOSIS — E11.9 TYPE 2 DIABETES MELLITUS WITHOUT COMPLICATION, WITHOUT LONG-TERM CURRENT USE OF INSULIN (HCC): ICD-10-CM

## 2017-12-22 DIAGNOSIS — I10 HTN (HYPERTENSION), BENIGN: ICD-10-CM

## 2017-12-22 DIAGNOSIS — Z95.1 S/P CABG X 1: ICD-10-CM

## 2017-12-22 DIAGNOSIS — I25.10 CORONARY ARTERY DISEASE INVOLVING NATIVE CORONARY ARTERY OF NATIVE HEART WITHOUT ANGINA PECTORIS: Primary | ICD-10-CM

## 2017-12-22 DIAGNOSIS — E78.5 DYSLIPIDEMIA: Primary | ICD-10-CM

## 2017-12-22 DIAGNOSIS — I25.10 CORONARY ARTERY DISEASE INVOLVING NATIVE CORONARY ARTERY OF NATIVE HEART WITHOUT ANGINA PECTORIS: ICD-10-CM

## 2017-12-22 RX ORDER — METFORMIN HYDROCHLORIDE 500 MG/1
1000 TABLET ORAL DAILY
COMMUNITY
End: 2018-08-29

## 2017-12-22 NOTE — PROGRESS NOTES
[id number]  [place], [address] [facility]    Office [phone]  Fax [phone]       Brenna Harrison is a 67 y.o. male. Last seen 6 months ago. Assessment  Encounter Diagnoses   Name Primary?  Dyslipidemia Yes    Coronary artery disease involving native coronary artery of native heart without angina pectoris     HTN (hypertension), benign     S/P CABG x 1     Type 2 diabetes mellitus without complication, without long-term current use of insulin ([facility])        Recommendations:    Mr. Ze Aviles has CAD s/p hybrid 1V CABG (off pump LIMA LAD), PCI D1 with JT January 2017. Stress nuclear study today is normal. Will discontinue Plavix next month, continue ASA indefinitely. Drivers of CAD progression include T2DM and HTN. Normotensive on current treatment. Lipids and DM managed by Dr. Yolanda Galloway. I encouraged him to work harder in terms of exercise. Follow-up Disposition:  Return in about 6 months (around 6/22/2018). Subjective:    Mr. Ze Aviles notes that he has gained a little weight. He reports FUCHS, but believes this is due to deconditioning as he has not been doing much cardio. He has been moving to a new home recently without exertional sxs. He has plans to bike in the near future. Patient denies any exertional chest pain, palpitations, syncope, orthopnea, edema or paroxysmal nocturnal dyspnea. He was recently discontinued on Metformin as most recent A1c was under 6%. He had been taking ASA routinely pre PCI. He is here with his wife today.     Cardiac risk factors   HTN yes  DM yes    Cardiac testing  Cath 5/21/2004 - EF 60%, mild to moderate coronary calcification, mild diffuse plaque     Exercise cardiolite 6/2011 - normal perfusion, EF 50%    Echo at [facility] 12/18/14 - EF 60-65%, mild LAE,     Stress echo 2/18/15 - 6 min 30 sec, 2-3 mm ST depression but normal stress echo study, resting EF 60%    Exercise cardiolite 10/17/16 - 5 min, mild reversible defects - distal anterior, distal inferior and apex. EF 53%, mild LV dilatation with stress    Cath 11/8/2016 - EDP 17, EF 60%, no AV gradient, mod-severe cor Ca2+, LM normal, LAD mid diffuse disease (70%), D1 focal 85% mid, LCX mild plaque, RCA large dom, mid 50%. Exercise cardiolite 12/22/17 - 6 min, normal perfusion, LVEF 49%    Past Medical History:   Diagnosis Date    CAD (coronary artery disease) 01/23/2017    stent    DJD (degenerative joint disease) of knee 11/2013    left TKR    ED (erectile dysfunction)     Essential hypertension     Hyperlipidemia     T2DM (type 2 diabetes mellitus) (HCC)     Thalassemia minor         Current Outpatient Prescriptions   Medication Sig Dispense Refill    metFORMIN (GLUCOPHAGE) 500 mg tablet Take 1,000 mg by mouth daily.  acetaminophen (TYLENOL EXTRA STRENGTH) 500 mg tablet Take 1,000 mg by mouth every six (6) hours as needed for Pain.  clopidogrel (PLAVIX) 75 mg tab Take 1 Tab by mouth daily. Indications: Thrombosis Prevention after PCI 30 Tab 11    CRESTOR 10 mg tablet TAKE ONE TABLET BY MOUTH EVERY EVENING 30 Tab 5    tadalafil (CIALIS) 5 mg tablet Take 5 mg by mouth daily.  amLODIPine (NORVASC) 10 mg tablet Take 10 mg by mouth daily.  aspirin delayed-release 81 mg tablet Take  by mouth daily. No Known Allergies     . Review of Systems  Constitutional: Negative for fever, chills, and diaphoresis. Respiratory: Negative for cough, hemoptysis, sputum production, and wheezing. +FUCHS  Cardiovascular: Negative for chest pain, palpitations, orthopnea, claudication, leg swelling and PND. Gastrointestinal: Negative for heartburn, nausea, vomiting, blood in stool and melena. Genitourinary: Negative for dysuria and flank pain. Musculoskeletal: Negative for joint pain and back pain. Skin: Negative for rash. Neurological: Negative for focal weakness, seizures, loss of consciousness, weakness and headaches. Endo/Heme/Allergies: Does not bruise/bleed easily. Psychiatric/Behavioral: Negative for memory loss. The patient does not have insomnia. Physical Exam  Visit Vitals    /80    Pulse 70    Resp 16    Ht 6' 1\" (1.854 m)    Wt 239 lb 9.6 oz (108.7 kg)    SpO2 97%    BMI 31.61 kg/m2     Wt Readings from Last 3 Encounters:   12/22/17 239 lb 9.6 oz (108.7 kg)   06/26/17 224 lb 12.8 oz (102 kg)   03/31/17 229 lb (103.9 kg)      General - well developed well nourished, slightly disheveled appearance  Neck - JVP normal, thyroid nl  Cardiac - normal S1,S2, no murmurs, rubs or gallops.  No clicks  Vascular - carotids without bruits, radials, femorals and pedal pulses equal bilateral  Lungs - clear to auscultation bilaterals, no rales ,wheezing or rhonchi  Abd - soft nontender, no HSM, no abd bruits  Extremities - no edema   Skin - no rash  Neuro - nonfocal  Psych - normal mood and affect    Cardiographics  ECG 2/2/15 - SR, interatrial conduction delay  EKG 3/23/16 - SR 66  Labs 8/8/16 - glucose 153, LDL-p 962, LDL-c 64, HDL-c 31, small LDL-p 786  Exercise cardiolite 12/22/17 - 6 min, normal perfusion, LVEF 49%      Written by Zeenat Smith, as dictated by Toy Orosco MD.   Toy Orosco MD

## 2018-06-25 ENCOUNTER — OFFICE VISIT (OUTPATIENT)
Dept: CARDIOLOGY CLINIC | Age: 73
End: 2018-06-25

## 2018-06-25 VITALS
HEART RATE: 57 BPM | OXYGEN SATURATION: 98 % | HEIGHT: 73 IN | WEIGHT: 233 LBS | DIASTOLIC BLOOD PRESSURE: 70 MMHG | RESPIRATION RATE: 16 BRPM | BODY MASS INDEX: 30.88 KG/M2 | SYSTOLIC BLOOD PRESSURE: 120 MMHG

## 2018-06-25 DIAGNOSIS — E11.9 TYPE 2 DIABETES MELLITUS WITHOUT COMPLICATION, WITHOUT LONG-TERM CURRENT USE OF INSULIN (HCC): ICD-10-CM

## 2018-06-25 DIAGNOSIS — E78.5 DYSLIPIDEMIA: ICD-10-CM

## 2018-06-25 DIAGNOSIS — Z95.1 S/P CABG X 1: ICD-10-CM

## 2018-06-25 DIAGNOSIS — I25.10 ATHEROSCLEROSIS OF NATIVE CORONARY ARTERY OF NATIVE HEART WITHOUT ANGINA PECTORIS: ICD-10-CM

## 2018-06-25 DIAGNOSIS — I25.10 CORONARY ARTERY DISEASE INVOLVING NATIVE CORONARY ARTERY OF NATIVE HEART WITHOUT ANGINA PECTORIS: ICD-10-CM

## 2018-06-25 DIAGNOSIS — I10 HTN (HYPERTENSION), BENIGN: ICD-10-CM

## 2018-06-25 RX ORDER — ESCITALOPRAM OXALATE 20 MG/1
20 TABLET ORAL DAILY
COMMUNITY
End: 2022-02-25 | Stop reason: SDUPTHER

## 2018-08-29 ENCOUNTER — APPOINTMENT (OUTPATIENT)
Dept: MRI IMAGING | Age: 73
DRG: 066 | End: 2018-08-29
Attending: INTERNAL MEDICINE
Payer: MEDICARE

## 2018-08-29 ENCOUNTER — HOSPITAL ENCOUNTER (INPATIENT)
Age: 73
LOS: 1 days | Discharge: HOME | DRG: 066 | End: 2018-08-30
Attending: EMERGENCY MEDICINE | Admitting: INTERNAL MEDICINE
Payer: MEDICARE

## 2018-08-29 ENCOUNTER — APPOINTMENT (OUTPATIENT)
Dept: CT IMAGING | Age: 73
DRG: 066 | End: 2018-08-29
Attending: EMERGENCY MEDICINE
Payer: MEDICARE

## 2018-08-29 DIAGNOSIS — R47.9 SPEECH DISTURBANCE, UNSPECIFIED TYPE: ICD-10-CM

## 2018-08-29 DIAGNOSIS — I63.9 CEREBROVASCULAR ACCIDENT (CVA), UNSPECIFIED MECHANISM (HCC): Primary | ICD-10-CM

## 2018-08-29 LAB
ANION GAP SERPL CALC-SCNC: 8 MMOL/L (ref 5–15)
APPEARANCE UR: CLEAR
BACTERIA URNS QL MICRO: NEGATIVE /HPF
BASOPHILS # BLD: 0 K/UL (ref 0–0.1)
BASOPHILS NFR BLD: 0 % (ref 0–1)
BILIRUB UR QL: NEGATIVE
BUN SERPL-MCNC: 20 MG/DL (ref 6–20)
BUN/CREAT SERPL: 19 (ref 12–20)
CALCIUM SERPL-MCNC: 8.5 MG/DL (ref 8.5–10.1)
CHLORIDE SERPL-SCNC: 106 MMOL/L (ref 97–108)
CO2 SERPL-SCNC: 26 MMOL/L (ref 21–32)
COLOR UR: ABNORMAL
COMMENT, HOLDF: NORMAL
CREAT SERPL-MCNC: 1.06 MG/DL (ref 0.7–1.3)
DIFFERENTIAL METHOD BLD: ABNORMAL
EOSINOPHIL # BLD: 0.2 K/UL (ref 0–0.4)
EOSINOPHIL NFR BLD: 3 % (ref 0–7)
EPITH CASTS URNS QL MICRO: ABNORMAL /LPF
ERYTHROCYTE [DISTWIDTH] IN BLOOD BY AUTOMATED COUNT: 17.2 % (ref 11.5–14.5)
GLUCOSE BLD STRIP.AUTO-MCNC: 161 MG/DL (ref 65–100)
GLUCOSE SERPL-MCNC: 212 MG/DL (ref 65–100)
GLUCOSE UR STRIP.AUTO-MCNC: 250 MG/DL
HCT VFR BLD AUTO: 29.4 % (ref 36.6–50.3)
HGB BLD-MCNC: 9.1 G/DL (ref 12.1–17)
HGB UR QL STRIP: NEGATIVE
HYALINE CASTS URNS QL MICRO: ABNORMAL /LPF (ref 0–5)
IMM GRANULOCYTES # BLD: 0 K/UL (ref 0–0.04)
IMM GRANULOCYTES NFR BLD AUTO: 0 % (ref 0–0.5)
KETONES UR QL STRIP.AUTO: NEGATIVE MG/DL
LEUKOCYTE ESTERASE UR QL STRIP.AUTO: NEGATIVE
LYMPHOCYTES # BLD: 2.2 K/UL (ref 0.8–3.5)
LYMPHOCYTES NFR BLD: 28 % (ref 12–49)
MCH RBC QN AUTO: 18.6 PG (ref 26–34)
MCHC RBC AUTO-ENTMCNC: 31 G/DL (ref 30–36.5)
MCV RBC AUTO: 60.2 FL (ref 80–99)
MONOCYTES # BLD: 0.4 K/UL (ref 0–1)
MONOCYTES NFR BLD: 5 % (ref 5–13)
NEUTS SEG # BLD: 4.9 K/UL (ref 1.8–8)
NEUTS SEG NFR BLD: 64 % (ref 32–75)
NITRITE UR QL STRIP.AUTO: NEGATIVE
NRBC # BLD: 0 K/UL (ref 0–0.01)
NRBC BLD-RTO: 0 PER 100 WBC
PH UR STRIP: 7 [PH] (ref 5–8)
PLATELET # BLD AUTO: 207 K/UL (ref 150–400)
PMV BLD AUTO: 9.5 FL (ref 8.9–12.9)
POTASSIUM SERPL-SCNC: 3.5 MMOL/L (ref 3.5–5.1)
PROT UR STRIP-MCNC: NEGATIVE MG/DL
RBC # BLD AUTO: 4.88 M/UL (ref 4.1–5.7)
RBC #/AREA URNS HPF: ABNORMAL /HPF (ref 0–5)
RBC MORPH BLD: ABNORMAL
RBC MORPH BLD: ABNORMAL
SAMPLES BEING HELD,HOLD: NORMAL
SERVICE CMNT-IMP: ABNORMAL
SODIUM SERPL-SCNC: 140 MMOL/L (ref 136–145)
SP GR UR REFRACTOMETRY: 1.01 (ref 1–1.03)
T4 FREE SERPL-MCNC: 1.1 NG/DL (ref 0.8–1.5)
TSH SERPL DL<=0.05 MIU/L-ACNC: 1.79 UIU/ML (ref 0.36–3.74)
UR CULT HOLD, URHOLD: NORMAL
UROBILINOGEN UR QL STRIP.AUTO: 0.2 EU/DL (ref 0.2–1)
WBC # BLD AUTO: 7.7 K/UL (ref 4.1–11.1)
WBC URNS QL MICRO: ABNORMAL /HPF (ref 0–4)

## 2018-08-29 PROCEDURE — 74011250636 HC RX REV CODE- 250/636: Performed by: INTERNAL MEDICINE

## 2018-08-29 PROCEDURE — 99285 EMERGENCY DEPT VISIT HI MDM: CPT

## 2018-08-29 PROCEDURE — 70544 MR ANGIOGRAPHY HEAD W/O DYE: CPT

## 2018-08-29 PROCEDURE — 84443 ASSAY THYROID STIM HORMONE: CPT | Performed by: INTERNAL MEDICINE

## 2018-08-29 PROCEDURE — 80048 BASIC METABOLIC PNL TOTAL CA: CPT | Performed by: EMERGENCY MEDICINE

## 2018-08-29 PROCEDURE — 83036 HEMOGLOBIN GLYCOSYLATED A1C: CPT | Performed by: INTERNAL MEDICINE

## 2018-08-29 PROCEDURE — 93005 ELECTROCARDIOGRAM TRACING: CPT

## 2018-08-29 PROCEDURE — 70551 MRI BRAIN STEM W/O DYE: CPT

## 2018-08-29 PROCEDURE — 85025 COMPLETE CBC W/AUTO DIFF WBC: CPT | Performed by: EMERGENCY MEDICINE

## 2018-08-29 PROCEDURE — 70450 CT HEAD/BRAIN W/O DYE: CPT

## 2018-08-29 PROCEDURE — 36415 COLL VENOUS BLD VENIPUNCTURE: CPT | Performed by: EMERGENCY MEDICINE

## 2018-08-29 PROCEDURE — 84439 ASSAY OF FREE THYROXINE: CPT | Performed by: INTERNAL MEDICINE

## 2018-08-29 PROCEDURE — 74011250637 HC RX REV CODE- 250/637: Performed by: INTERNAL MEDICINE

## 2018-08-29 PROCEDURE — 65660000000 HC RM CCU STEPDOWN

## 2018-08-29 PROCEDURE — 82962 GLUCOSE BLOOD TEST: CPT

## 2018-08-29 PROCEDURE — 81001 URINALYSIS AUTO W/SCOPE: CPT | Performed by: EMERGENCY MEDICINE

## 2018-08-29 RX ORDER — ROSUVASTATIN CALCIUM 10 MG/1
10 TABLET, COATED ORAL DAILY
COMMUNITY
End: 2022-03-23 | Stop reason: SDUPTHER

## 2018-08-29 RX ORDER — METFORMIN HYDROCHLORIDE 500 MG/1
1000 TABLET, EXTENDED RELEASE ORAL DAILY
COMMUNITY
End: 2022-02-09 | Stop reason: SDUPTHER

## 2018-08-29 RX ORDER — POTASSIUM CHLORIDE 750 MG/1
40 TABLET, FILM COATED, EXTENDED RELEASE ORAL
Status: COMPLETED | OUTPATIENT
Start: 2018-08-29 | End: 2018-08-29

## 2018-08-29 RX ORDER — ACETAMINOPHEN 650 MG/1
650 SUPPOSITORY RECTAL
Status: DISCONTINUED | OUTPATIENT
Start: 2018-08-29 | End: 2018-08-30 | Stop reason: HOSPADM

## 2018-08-29 RX ORDER — ONDANSETRON 2 MG/ML
4 INJECTION INTRAMUSCULAR; INTRAVENOUS
Status: DISCONTINUED | OUTPATIENT
Start: 2018-08-29 | End: 2018-08-30 | Stop reason: HOSPADM

## 2018-08-29 RX ORDER — SODIUM CHLORIDE 0.9 % (FLUSH) 0.9 %
5-10 SYRINGE (ML) INJECTION EVERY 8 HOURS
Status: DISCONTINUED | OUTPATIENT
Start: 2018-08-29 | End: 2018-08-30 | Stop reason: HOSPADM

## 2018-08-29 RX ORDER — DEXTROSE 50 % IN WATER (D50W) INTRAVENOUS SYRINGE
25-50 AS NEEDED
Status: DISCONTINUED | OUTPATIENT
Start: 2018-08-29 | End: 2018-08-30 | Stop reason: HOSPADM

## 2018-08-29 RX ORDER — ACETAMINOPHEN 325 MG/1
650 TABLET ORAL
Status: DISCONTINUED | OUTPATIENT
Start: 2018-08-29 | End: 2018-08-30 | Stop reason: HOSPADM

## 2018-08-29 RX ORDER — ESCITALOPRAM OXALATE 10 MG/1
5 TABLET ORAL DAILY
Status: DISCONTINUED | OUTPATIENT
Start: 2018-08-30 | End: 2018-08-30 | Stop reason: HOSPADM

## 2018-08-29 RX ORDER — ASPIRIN 81 MG/1
81 TABLET ORAL DAILY
Status: DISCONTINUED | OUTPATIENT
Start: 2018-08-30 | End: 2018-08-30 | Stop reason: HOSPADM

## 2018-08-29 RX ORDER — INSULIN LISPRO 100 [IU]/ML
INJECTION, SOLUTION INTRAVENOUS; SUBCUTANEOUS
Status: DISCONTINUED | OUTPATIENT
Start: 2018-08-29 | End: 2018-08-30 | Stop reason: HOSPADM

## 2018-08-29 RX ORDER — ENOXAPARIN SODIUM 100 MG/ML
40 INJECTION SUBCUTANEOUS EVERY 24 HOURS
Status: DISCONTINUED | OUTPATIENT
Start: 2018-08-29 | End: 2018-08-30 | Stop reason: HOSPADM

## 2018-08-29 RX ORDER — METFORMIN HYDROCHLORIDE 500 MG/1
1000 TABLET, EXTENDED RELEASE ORAL DAILY
Status: DISCONTINUED | OUTPATIENT
Start: 2018-08-30 | End: 2018-08-30 | Stop reason: HOSPADM

## 2018-08-29 RX ORDER — MAGNESIUM SULFATE 100 %
4 CRYSTALS MISCELLANEOUS AS NEEDED
Status: DISCONTINUED | OUTPATIENT
Start: 2018-08-29 | End: 2018-08-30 | Stop reason: HOSPADM

## 2018-08-29 RX ORDER — AMLODIPINE BESYLATE 5 MG/1
10 TABLET ORAL DAILY
Status: DISCONTINUED | OUTPATIENT
Start: 2018-08-30 | End: 2018-08-30 | Stop reason: HOSPADM

## 2018-08-29 RX ORDER — SODIUM CHLORIDE 0.9 % (FLUSH) 0.9 %
5-10 SYRINGE (ML) INJECTION AS NEEDED
Status: DISCONTINUED | OUTPATIENT
Start: 2018-08-29 | End: 2018-08-30 | Stop reason: HOSPADM

## 2018-08-29 RX ORDER — CLOPIDOGREL BISULFATE 75 MG/1
75 TABLET ORAL DAILY
Status: DISCONTINUED | OUTPATIENT
Start: 2018-08-30 | End: 2018-08-30 | Stop reason: HOSPADM

## 2018-08-29 RX ORDER — LISINOPRIL AND HYDROCHLOROTHIAZIDE 20; 25 MG/1; MG/1
1 TABLET ORAL DAILY
COMMUNITY
End: 2021-08-20 | Stop reason: ALTCHOICE

## 2018-08-29 RX ORDER — ROSUVASTATIN CALCIUM 10 MG/1
10 TABLET, COATED ORAL DAILY
Status: DISCONTINUED | OUTPATIENT
Start: 2018-08-30 | End: 2018-08-30 | Stop reason: HOSPADM

## 2018-08-29 RX ADMIN — ENOXAPARIN SODIUM 40 MG: 40 INJECTION, SOLUTION INTRAVENOUS; SUBCUTANEOUS at 20:30

## 2018-08-29 RX ADMIN — Medication 10 ML: at 20:31

## 2018-08-29 RX ADMIN — POTASSIUM CHLORIDE 40 MEQ: 750 TABLET, EXTENDED RELEASE ORAL at 20:30

## 2018-08-29 NOTE — ED TRIAGE NOTES
Patient reports onset of confusion, aphasia, and right facial droop that began yesterday. He is alert and oriented on arrival to triage, accompanied by his wife. MD present to evaluate patient in triage. RENAE ceballos.

## 2018-08-29 NOTE — H&P
Admission History and Physical 
 
 
NAME:  Torres Malave :   1945 MRN:  [id number] PCP:  Giovanny Suh MD  
 
Date/Time:  2018 Assessment/Plan:   
  
CVA (cerebral vascular accident) ([facility] (2018): Suspected by dysarthria, right facial droop, and right sided tongue deviation. Head CT with age indeterminant lesions. Symptoms present for over 24 hours at this point. -- neuro checks -- neurology consultation -- MRI / MRA brain -- carotid doppler 
 -- echocardiogram 
 -- check lipid profile 
 -- add Plavix to ASA 
 -- PT / OT / Speech evaluation HTN (hypertension), benign (2011): -- continue amlodipine Type 2 diabetes mellitus without complication ([facility] (): Has been under good control. Last A1c was under 6 per patient. -- continue metformin 
 -- add SSI 
 
CAD (coronary artery disease) (2017): S/P CABG. -- continue low dose ASA 
 -- add Plavix Dyslipidemia (3/17/2017): -- continue crestor -- check lipids in AM 
 
Anemia:  Microcytic by labs. Similar to 2017, but this was post op CABG. -- check iron, b12, folate -- hemoccult stool Subjective: CHIEF COMPLAINT:  Change in speech HISTORY OF PRESENT ILLNESS:    
Mr. Liana Mujica is a [address] [place] [address] y.o.  male who is admitted with CVA (cerebral vascular accident) ([facility] Mr. Liana Mujica presented to the Emergency Department today complaining of slurred speech. Symptoms started last night. Was still present upon waking this AM.  Associated with some difficulty with swallowing. Had some unsteadiness when up to go to bathroom overnight and today. No HA. No fever, chills. No weakness or numbness of extremities. Past Medical History:  
Diagnosis Date  CAD (coronary artery disease) 2017  
 stent  DJD (degenerative joint disease) of knee 2013  
 left TKR  ED (erectile dysfunction)  Essential hypertension  Hyperlipidemia  T2DM (type 2 diabetes mellitus) ([facility]  Thalassemia minor Past Surgical History:  
Procedure Laterality Date  CABG, ARTERY-VEIN, SINGLE  01/19/2017 DAVINCI SABINE TAKEDOWN, LEFT ANTERIOR THORACOTOMY, OFF PUMP CORONARY ARTERY BYPASS GRAFTING X1,  
 HX HEART CATHETERIZATION  2004  
 mild CAD, EF 60-65%  HX KNEE ARTHROSCOPY Left 11/2013  HX KNEE REPLACEMENT Left 2013 [address] [place] [address]  STRESS TEST CARDIOLITE  5/14/2004  
 5 min, inferior ischemia, EF 60%  STRESS TEST CARDIOLITE  6/30/11  
 4 min, normal perfusion EF 50% Social History Substance Use Topics  Smoking status: Former Smoker Packs/day: 2.00 Years: 15.00 Quit date: 11/4/1981  Smokeless tobacco: Never Used Comment: quit > 30 years ago  Alcohol use Yes Comment: rare social   
  
 
Family History Problem Relation Age of Onset  Cancer Mother  Diabetes Maternal Grandfather  Hypertension Maternal Grandfather  Other Brother   
  kathy cardiac  Diabetes Brother  Heart Disease Brother No Known Allergies Prior to Admission medications Medication Sig Start Date End Date Taking? Authorizing Provider  
escitalopram oxalate (LEXAPRO) 5 mg tablet Take  by mouth daily. Historical Provider  
metFORMIN (GLUCOPHAGE) 500 mg tablet Take 1,000 mg by mouth daily. Historical Provider  
acetaminophen (TYLENOL EXTRA STRENGTH) 500 mg tablet Take 1,000 mg by mouth every six (6) hours as needed for Pain. Historical Provider  
clopidogrel (PLAVIX) 75 mg tab Take 1 Tab by mouth daily. Indications: Thrombosis Prevention after PCI 1/24/17   Lamont Don NP  
CRESTOR 10 mg tablet TAKE ONE TABLET BY MOUTH EVERY EVENING 10/13/16   Carrie Contreras NP  
tadalafil (CIALIS) 5 mg tablet Take 5 mg by mouth daily. Historical Provider  
amLODIPine (NORVASC) 10 mg tablet Take 10 mg by mouth daily. Historical Provider aspirin delayed-release 81 mg tablet Take  by mouth daily. Historical Provider Review of Systems: 
(bold if positive, if negative) Gen:  Eyes:  ENT:  CVS:  Pulm:  GI:   
:   
MS:  Skin:  Endo:   
Hem:  Renal:   
Neuro:  , dysarthria, unsteady gait Objective: VITALS:   
Vital signs reviewed; most recent are: 
 
Visit Vitals  /70  Pulse (!) 58  Temp 97.6 °F (36.4 °C)  Resp 14  
 Ht 6' 1\" (1.854 m)  Wt 105.2 kg (232 lb)  SpO2 100%  BMI 30.61 kg/m2 SpO2 Readings from Last 6 Encounters:  
08/29/18 100% 06/25/18 98% 12/22/17 97% 06/26/17 99% 02/27/17 98% 02/17/17 98% No intake or output data in the 24 hours ending 08/29/18 1830 Exam:  
 
Physical Exam: 
 
Gen:  Well-developed, well-nourished, in no acute distress HEENT:  Pink conjunctivae, PERRL, hearing intact to voice, moist mucous membranes Neck:  Supple Resp:  No accessory muscle use, clear breath sounds without wheezes rales or rhonchi 
Card:  No murmurs, normal S1, S2 without thrills, bruits or peripheral edema Abd:  Soft, non-tender, non-distended, normoactive bowel sounds are present Musc:  No cyanosis Skin:  No rashes or ulcers, skin turgor is good Neuro:  Right facial droop, tongue deviates to right, mild dysarthria,  strength is 5/5 bilaterally, dorsi / plantarflexion strength is 5/5 bilaterally, follows commands appropriately, no pronator drift Psych:  Alert with good insight. Oriented to person, place, and time Labs: 
 
Recent Labs  
   08/29/18 
 1652 WBC  7.7 HGB  9.1*  
HCT  29.4*  
PLT  207 Recent Labs  
   08/29/18 
 1652 NA  140  
K  3.5 CL  106 CO2  26 GLU  212* BUN  20  
CREA  1.06  
CA  8.5 Lab Results Component Value Date/Time Glucose (POC) 141 (H) 01/24/2017 06:08 AM  
 Glucose (POC) 164 (H) 01/23/2017 09:18 PM  
 
No results for input(s): PH, PCO2, PO2, HCO3, FIO2 in the last 72 hours. No results for input(s): INR in the last 72 hours. No lab exists for component: INREXT Head CT:   there are focal low densities in the left paraventricular deep white matter and right basal ganglia region which may represent prior focal ischemia, age undetermined. There is no intracranial hemorrhage or mass effect. EKG reviewed:   Sinus bradycardia, 1st deg avb, no acute ischemic st/t wave changes. Medical records reviewed in preparation for this admission: Old medical records. Surrogate decision maker:  spouse Total time spent in care of this patient: 72 Minutes Care Plan discussed with: Patient and Family Discussed:  Care Plan Prophylaxis:  Lovenox Probable Disposition:   PT, OT, RN 
        
___________________________________________________ Attending Physician: Stalin Castillo MD

## 2018-08-29 NOTE — ED PROVIDER NOTES
HPI Comments: 68 y.o. male with past medical history significant for DM2, CAD, HLD, HTN, DJD, who presents ambulatory with wife, to the ED with cc of dysarthria . Pt reports onset of dysarthria described as \"slurred speech\" yesterday at ~6:00PM during a group tour. He states he felt fatigued and left the group early to sit down. He endorses associated mild trouble swallowing, mild drooling, mild unsteady gait, and mild confusion last night. Per pt and wife, his speech is \"definitely still not normal\" at this time, so they came to the ED to r/o CVA. Per wife, pt had a CABG and stents placed 1.5 years ago. Pt notes baseline chronic bilateral ankle swelling that is unchanged. There are no other acute medical concerns at this time. Social Hx: former Tobacco use, rare/social EtOH use, denies Illicit Drug use PCP: Isom Mortimer, MD 
 
Note written by Errol Coulter, as dictated by Marvin Maddox MD 5:03 PM 
 
 
The history is provided by the patient and the spouse. No  was used. Past Medical History:  
Diagnosis Date  CAD (coronary artery disease) 01/23/2017  
 stent  DJD (degenerative joint disease) of knee 11/2013  
 left TKR  ED (erectile dysfunction)  Essential hypertension  Hyperlipidemia  T2DM (type 2 diabetes mellitus) ([facility] [address]  Thalassemia minor Past Surgical History:  
Procedure Laterality Date  CABG, ARTERY-VEIN, SINGLE  01/19/2017 DAVINCI SABINE TAKEDOWN, LEFT ANTERIOR THORACOTOMY, OFF PUMP CORONARY ARTERY BYPASS GRAFTING X1,  
 HX HEART CATHETERIZATION  2004  
 mild CAD, EF 60-65%  HX KNEE ARTHROSCOPY Left 11/2013  HX KNEE REPLACEMENT Left 2013 [address] [place] [address]  STRESS TEST CARDIOLITE  5/14/2004  
 5 min, inferior ischemia, EF 60%  STRESS TEST CARDIOLITE  6/30/11  
 4 min, normal perfusion EF 50% Family History:  
Problem Relation Age of Onset  Cancer Mother  Diabetes Maternal Grandfather  Hypertension Maternal Grandfather  Other Brother   
  kathy cardiac  Diabetes Brother  Heart Disease Brother Social History Social History  Marital status:  Spouse name: N/A  
 Number of children: N/A  
 Years of education: N/A Occupational History  Not on file. Social History Main Topics  Smoking status: Former Smoker Packs/day: 2.00 Years: 15.00 Quit date: 11/4/1981  Smokeless tobacco: Never Used Comment: quit > 30 years ago  Alcohol use Yes Comment: rare social   
 Drug use: No  
 Sexual activity: Not on file Other Topics Concern  Not on file Social History Narrative ALLERGIES: Review of patient's allergies indicates no known allergies. Review of Systems Constitutional: Positive for fatigue. Negative for chills and fever. HENT: Positive for drooling and trouble swallowing. Negative for ear pain and sore throat. Eyes: Negative for photophobia and pain. Respiratory: Negative for chest tightness and shortness of breath. Cardiovascular: Positive for leg swelling (baseline BL ankles). Negative for chest pain. Gastrointestinal: Negative for abdominal pain, nausea and vomiting. Genitourinary: Negative for dysuria and flank pain. Musculoskeletal: Positive for gait problem (unsteady). Negative for back pain and neck pain. Skin: Negative for rash and wound. Neurological: Positive for speech difficulty. Negative for dizziness, light-headedness and headaches. Psychiatric/Behavioral: Positive for confusion. Vitals:  
 08/29/18 1638 BP: 172/78 Pulse: 64 Resp: 18 Temp: 97.6 °F (36.4 °C) SpO2: 98% Weight: 105.2 kg (232 lb) Height: 6' 1\" (1.854 m) Physical Exam  
Constitutional: He is oriented to person, place, and time. He appears well-developed and well-nourished. No distress. HENT:  
Head: Normocephalic and atraumatic. Eyes: Conjunctivae and EOM are normal.  
Neck: Normal range of motion. Neck supple. Cardiovascular: Normal rate, regular rhythm, normal heart sounds and intact distal pulses. No murmur heard. Pulmonary/Chest: Effort normal and breath sounds normal. No stridor. No respiratory distress. Abdominal: Soft. Bowel sounds are normal. There is no tenderness. Musculoskeletal: Normal range of motion. He exhibits no edema or tenderness. Neurological: He is alert and oriented to person, place, and time. Right face may be a little more relaxed than the left Speech sounds clear to me Able to ambulate without assistance Skin: Skin is warm and dry. He is not diaphoretic. Psychiatric: He has a normal mood and affect. Nursing note and vitals reviewed. MDM Number of Diagnoses or Management Options Cerebrovascular accident (CVA), unspecified mechanism ([facility] [address]):  
Speech disturbance, unspecified type:  
Diagnosis management comments: Patient with possible CVA - symptom about 23 hours old - check labs and get head Ct and likely admit for further eval and care. Patient needs stroke work-up as he likely had a CVA Amount and/or Complexity of Data Reviewed Clinical lab tests: ordered and reviewed Tests in the radiology section of CPT®: ordered and reviewed ED Course Procedures ED EKG interpretation: 
Sinus bradycardia with 1st degree AV block, rate 58, no STEMI. Note written by Errol Sparrow, as dictated by Akbar Guadarrama MD 4:52 PM 
 
CONSULT NOTE: 
6:15 PM Akbar Guadarrama MD spoke with Dr. Mary Ellen Ang, Consult for Hospitalist.  Discussed available diagnostic tests and clinical findings. Dr. Zeenat Mclaughlin will admit pt. VITALS:  
Patient Vitals for the past 8 hrs: 
 Temp Pulse Resp BP SpO2  
08/29/18 1800 - (!) 58 14 162/70 100 % 08/29/18 1730 - (!) 58 13 144/64 96 % 08/29/18 1715 - (!) 59 12 156/70 98 % 08/29/18 1638 97.6 °F (36.4 °C) 64 18 172/78 98 % Recent Results (from the past 24 hour(s)) EKG, 12 LEAD, INITIAL Collection Time: 08/29/18  4:48 PM  
Result Value Ref Range Ventricular Rate 58 BPM  
 Atrial Rate 58 BPM  
 P-R Interval 218 ms QRS Duration 108 ms Q-T Interval 428 ms QTC Calculation (Bezet) 420 ms Calculated P Axis 48 degrees Calculated R Axis 28 degrees Calculated T Axis 37 degrees Diagnosis Sinus bradycardia with 1st degree AV block Otherwise normal ECG When compared with ECG of 20-JAN-2017 09:39, 
CA interval has increased SAMPLES BEING HELD Collection Time: 08/29/18  4:52 PM  
Result Value Ref Range SAMPLES BEING HELD SST. TERESITA   
 COMMENT Add-on orders for these samples will be processed based on acceptable specimen integrity and analyte stability, which may vary by analyte. CBC WITH AUTOMATED DIFF Collection Time: 08/29/18  4:52 PM  
Result Value Ref Range WBC 7.7 4.1 - 11.1 K/uL  
 RBC 4.88 4.10 - 5.70 M/uL HGB 9.1 (L) 12.1 - 17.0 g/dL HCT 29.4 (L) 36.6 - 50.3 % MCV 60.2 (L) 80.0 - 99.0 FL  
 MCH 18.6 (L) 26.0 - 34.0 PG  
 MCHC 31.0 30.0 - 36.5 g/dL  
 RDW 17.2 (H) 11.5 - 14.5 % PLATELET 174 740 - 832 K/uL MPV 9.5 8.9 - 12.9 FL  
 NRBC 0.0 0  WBC ABSOLUTE NRBC 0.00 0.00 - 0.01 K/uL NEUTROPHILS 64 32 - 75 % LYMPHOCYTES 28 12 - 49 % MONOCYTES 5 5 - 13 % EOSINOPHILS 3 0 - 7 % BASOPHILS 0 0 - 1 % IMMATURE GRANULOCYTES 0 0.0 - 0.5 % ABS. NEUTROPHILS 4.9 1.8 - 8.0 K/UL  
 ABS. LYMPHOCYTES 2.2 0.8 - 3.5 K/UL  
 ABS. MONOCYTES 0.4 0.0 - 1.0 K/UL  
 ABS. EOSINOPHILS 0.2 0.0 - 0.4 K/UL  
 ABS. BASOPHILS 0.0 0.0 - 0.1 K/UL  
 ABS. IMM. GRANS. 0.0 0.00 - 0.04 K/UL  
 DF SMEAR SCANNED    
 RBC COMMENTS MICROCYTOSIS 
PRESENT 
    
 RBC COMMENTS OVALOCYTES PRESENT 
    
METABOLIC PANEL, BASIC Collection Time: 08/29/18  4:52 PM  
Result Value Ref Range Sodium 140 136 - 145 mmol/L  Potassium 3.5 3.5 - 5.1 mmol/L  
 Chloride 106 97 - 108 mmol/L  
 CO2 26 21 - 32 mmol/L Anion gap 8 5 - 15 mmol/L Glucose 212 (H) 65 - 100 mg/dL BUN 20 6 - 20 MG/DL Creatinine 1.06 0.70 - 1.30 MG/DL  
 BUN/Creatinine ratio 19 12 - 20 GFR est AA >60 >60 ml/min/1.73m2 GFR est non-AA >60 >60 ml/min/1.73m2 Calcium 8.5 8.5 - 10.1 MG/DL URINALYSIS W/MICROSCOPIC Collection Time: 08/29/18  6:19 PM  
Result Value Ref Range Color YELLOW/STRAW Appearance CLEAR CLEAR Specific gravity 1.013 1.003 - 1.030    
 pH (UA) 7.0 5.0 - 8.0 Protein NEGATIVE  NEG mg/dL Glucose 250 (A) NEG mg/dL Ketone NEGATIVE  NEG mg/dL Bilirubin NEGATIVE  NEG Blood NEGATIVE  NEG Urobilinogen 0.2 0.2 - 1.0 EU/dL Nitrites NEGATIVE  NEG Leukocyte Esterase NEGATIVE  NEG    
 WBC 0-4 0 - 4 /hpf  
 RBC 0-5 0 - 5 /hpf Epithelial cells FEW FEW /lpf Bacteria NEGATIVE  NEG /hpf Hyaline cast 0-2 0 - 5 /lpf URINE CULTURE HOLD SAMPLE Collection Time: 08/29/18  6:19 PM  
Result Value Ref Range Urine culture hold URINE ON HOLD IN MICROBIOLOGY DEPT FOR 3 DAYS. IF UNPRESERVED URINE IS SUBMITTED, IT CANNOT BE USED FOR ADDITIONAL TESTING AFTER 24 HRS, RECOLLECTION WILL BE REQUIRED. Ct Head Wo Cont Result Date: 8/29/2018 EXAM:  CT HEAD WO CONT INDICATION:   R side facial droop.  slurred speech.  22.5 hours. Onset of confusion, aphasia and right facial droop began yesterday. Slurred speech began at 6:00 PM yesterday. COMPARISON: None. CONTRAST:  None. TECHNIQUE: Unenhanced CT of the head was performed using 5 mm images. Brain and bone windows were generated. CT dose reduction was achieved through use of a standardized protocol tailored for this examination and automatic exposure control for dose modulation. FINDINGS: Generalized prominence of cerebral sulci and ventricles is mildly increased but commensurate with age. . Periventricular white matter low-density is minimal, but there are focal low densities in the left paraventricular deep white matter (centrum semiovale) and in the right basal ganglia region. There is no mass effect. . There is no intracranial hemorrhage, extra-axial collection, mass, mass effect or midline shift. The basilar cisterns are open. No acute infarct is identified. The bone windows demonstrate no abnormalities. Retention cyst is suggested in the right maxillary sinus. There are no air-fluid levels. Guillermo Tiffani IMPRESSION: 1. No definite acute intracranial abnormality. However, there are focal low densities in the left paraventricular deep white matter and right basal ganglia region which may represent prior focal ischemia, age undetermined. There is no intracranial hemorrhage or mass effect.

## 2018-08-29 NOTE — IP AVS SNAPSHOT
[facility] 
 
 
 [address] [facility] [address] [facility] 
[phone] Patient: Elinor Gayle MRN: [id number] GLZ:[id number] A check sakina indicates which time of day the medication should be taken. My Medications START taking these medications Instructions Each Dose to Equal  
 Morning Noon Evening Bedtime  
 clopidogrel 75 mg Tab Commonly known as:  PLAVIX Start taking on:  8/31/2018 Your last dose was: Your next dose is: Take 1 Tab by mouth daily. 75 mg  
    
   
   
   
  
 famotidine 20 mg tablet Commonly known as:  PEPCID Your last dose was: Your next dose is: Take 1 Tab by mouth two (2) times a day. 20 mg CONTINUE taking these medications Instructions Each Dose to Equal  
 Morning Noon Evening Bedtime  
 amLODIPine 10 mg tablet Commonly known as:  Tad Ilda Your last dose was: Your next dose is: Take 10 mg by mouth daily. 10 mg  
    
   
   
   
  
 aspirin delayed-release 81 mg tablet Your last dose was: Your next dose is: Take 81 mg by mouth daily. 81 mg  
    
   
   
   
  
 CIALIS 5 mg tablet Generic drug:  tadalafil Your last dose was: Your next dose is: Take 5 mg by mouth daily. 5 mg CRESTOR 10 mg tablet Generic drug:  rosuvastatin Your last dose was: Your next dose is: Take 10 mg by mouth daily. 10 mg  
    
   
   
   
  
 LEXAPRO 5 mg tablet Generic drug:  escitalopram oxalate Your last dose was: Your next dose is: Take 5 mg by mouth daily. 5 mg  
    
   
   
   
  
 lisinopril-hydroCHLOROthiazide 20-25 mg per tablet Commonly known as:  Francisco Cunha Your last dose was: Your next dose is: Take 1 Tab by mouth daily. 1 Tab metFORMIN  mg tablet Commonly known as:  GLUCOPHAGE XR Your last dose was: Your next dose is: Take 1,000 mg by mouth daily. 1000 mg  
    
   
   
   
  
 TYLENOL EXTRA STRENGTH 500 mg tablet Generic drug:  acetaminophen Your last dose was: Your next dose is: Take 1,000 mg by mouth every six (6) hours as needed for Pain. 1000 mg Where to Get Your Medications These medications were sent to [facility] [address] [place] [address], [facility], [address] [facility] [address] Phone:  [phone]  
  clopidogrel 75 mg Tab  
 famotidine 20 mg tablet

## 2018-08-29 NOTE — PROGRESS NOTES
[facility]: MED RECONCILIATION Metoprolol XL 25mg daily - appears to no longer take and last filled 4/2018 Medications added: · Lisinopril-HCTZ Medications removed: 
· Plavix 75mg daily Medications adjusted: · Metformin- previously IR 
· Admin time crestor (pt takes everything in the morning Information obtained from: Patient, [address] [place] Allergies: Review of patient's allergies indicates no known allergies. Prior to Admission Medications:  
 
Medication Documentation Review Audit Reviewed by Dilia Kidd (Pharmacist) on 08/29/18 at 6650 Medication Sig Documenting Provider Last Dose Status Taking?  
 
 acetaminophen (TYLENOL EXTRA STRENGTH) 500 mg tablet Take 1,000 mg by mouth every six (6) hours as needed for Pain. Historical Provider  Active Yes  
 amLODIPine (NORVASC) 10 mg tablet Take 10 mg by mouth daily. Historical Provider 8/29/2018 AM Active Yes  
 aspirin delayed-release 81 mg tablet Take 81 mg by mouth daily. Historical Provider 8/29/2018 AM Active Yes  
 escitalopram oxalate (LEXAPRO) 5 mg tablet Take 5 mg by mouth daily. Historical Provider  Active No  
 lisinopril-hydroCHLOROthiazide (PRINZIDE, ZESTORETIC) 20-25 mg per tablet Take 1 Tab by mouth daily. Historical Provider 8/29/2018 AM Active Yes  
 metFORMIN ER (GLUCOPHAGE XR) 500 mg tablet Take 1,000 mg by mouth daily. Historical Provider 8/29/2018 AM Active Yes  
 rosuvastatin (CRESTOR) 10 mg tablet Take 10 mg by mouth daily. Historical Provider 8/29/2018 AM Active Yes  
 tadalafil (CIALIS) 5 mg tablet Take 5 mg by mouth daily. Historical Provider 8/29/2018 AM Active Yes Thank you, 
Sterling Muhammad, Pharm. D.

## 2018-08-29 NOTE — IP AVS SNAPSHOT
[address] [facility] 104 1007 [facility] 
[phone] Patient: Reymundo Lakhani MRN: [id number] SHY:[id number] About your hospitalization You were admitted on:  August 29, 2018 You last received care in the:  [facility] 5M1 MED SURG 1 You were discharged on:  August 30, 2018 Why you were hospitalized Your primary diagnosis was:  Cva (Cerebral Vascular Accident) (Hcc) Your diagnoses also included:  Htn (Hypertension), Benign, Type 2 Diabetes Mellitus Without Complication (Hcc), Cad (Coronary Artery Disease), Dyslipidemia Follow-up Information Follow up With Details Comments Contact Info Srinivas Dubose On 9/4/2018 Hospital PCP f/u appointment on Tuesday 9/4 @ 10:30 a.m. [address] [facility] 
[phone] Aquiles Rios MD On 10/2/2018 Follow-up appointment on Tuesday 10/2 @ 10:40 a.m. [address] [email or web address] [address] [facility] 99 [address] 
[phone] [facility] On 9/1/2018 [facility] will call the patient on Saturday September 1 @ 9:00 a.m. [phone] Your Scheduled Appointments Tuesday October 02, 2018 10:40 AM EDT Follow Up with Aquiles Rios MD  
[facility]) [address] [facility] [address] [facility] 99 [id number] [phone] Discharge Orders None A check sakina indicates which time of day the medication should be taken. My Medications START taking these medications Instructions Each Dose to Equal  
 Morning Noon Evening Bedtime  
 clopidogrel 75 mg Tab Commonly known as:  PLAVIX Start taking on:  8/31/2018 Your last dose was: Your next dose is: Take 1 Tab by mouth daily. 75 mg  
    
   
   
   
  
 famotidine 20 mg tablet Commonly known as:  PEPCID Your last dose was: Your next dose is: Take 1 Tab by mouth two (2) times a day. 20 mg CONTINUE taking these medications Instructions Each Dose to Equal  
 Morning Noon Evening Bedtime  
 amLODIPine 10 mg tablet Commonly known as:  Tyler Merlos Your last dose was: Your next dose is: Take 10 mg by mouth daily. 10 mg  
    
   
   
   
  
 aspirin delayed-release 81 mg tablet Your last dose was: Your next dose is: Take 81 mg by mouth daily. 81 mg  
    
   
   
   
  
 CIALIS 5 mg tablet Generic drug:  tadalafil Your last dose was: Your next dose is: Take 5 mg by mouth daily. 5 mg CRESTOR 10 mg tablet Generic drug:  rosuvastatin Your last dose was: Your next dose is: Take 10 mg by mouth daily. 10 mg  
    
   
   
   
  
 LEXAPRO 5 mg tablet Generic drug:  escitalopram oxalate Your last dose was: Your next dose is: Take 5 mg by mouth daily. 5 mg  
    
   
   
   
  
 lisinopril-hydroCHLOROthiazide 20-25 mg per tablet Commonly known as:  Maria G Coal Your last dose was: Your next dose is: Take 1 Tab by mouth daily. 1 Tab  
    
   
   
   
  
 metFORMIN  mg tablet Commonly known as:  GLUCOPHAGE XR Your last dose was: Your next dose is: Take 1,000 mg by mouth daily. 1000 mg  
    
   
   
   
  
 TYLENOL EXTRA STRENGTH 500 mg tablet Generic drug:  acetaminophen Your last dose was: Your next dose is: Take 1,000 mg by mouth every six (6) hours as needed for Pain. 1000 mg Where to Get Your Medications These medications were sent to [address], [address] [place] [address] [address] [facility] [address] Phone:  [phone]  
  clopidogrel 75 mg Tab  
 famotidine 20 mg tablet Discharge Instructions HOSPITALIST DISCHARGE INSTRUCTIONS 
NAME: Sarita Vallejo :  1945 MRN:  [id number] Date/Time:  2018 1:43 PM 
 
ADMIT DATE: 2018 DISCHARGE DATE: 2018 DISCHARGE DIAGNOSIS: 
Stroke MEDICATIONS: 
 
· It is important that you take the medication exactly as they are prescribed. · Keep your medication in the bottles provided by the pharmacist and keep a list of the medication names, dosages, and times to be taken in your wallet. · Do not take other medications without consulting your doctor. What to do at Home: 1. Check your blood pressure at home twice a day. Call your doctor for blood pressure greater than 160/90 or lower than 110/55 or if you have dizziness or lightheadedness. 2.  Check you blood sugar twice a day. Call your doctor for blood sugar persistently greater than 200 or lower than 80. Recommended diet:  Cardiac Diet and Diabetic Diet Recommended activity: Activity as tolerated If you experience any of the following symptoms then please call your primary care physician or return to the emergency room if you cannot get hold of your doctor: 
Fever, chills, nausea, vomiting, diarrhea, change in mentation, falling, bleeding, shortness of breath, numbness, weakness, worsened speech or swallowing. Follow Up: Follow-up Information Follow up With Details Comments Contact Info Charlene Ortiz MD In 1 week  [facility] [address] 
[phone] Hilario Rabago MD In 4 weeks  [place] [address] [address] [facility] [address] 
[phone] Information obtained by : 
I understand that if any problems occur once I am at home I am to contact my physician. I understand and acknowledge receipt of the instructions indicated above. Physician's or R.N.'s Signature                                                                  Date/Time Patient or Representative Signature                                                          Date/Time Learning About an Ischemic Stroke What is an ischemic stroke? An ischemic (say \"Rajesh\") stroke occurs when a blood clot blocks a blood vessel in the brain. This means that blood cannot flow to some part of the brain. Without blood and the oxygen it carries, this part of the brain starts to die. The part of the body controlled by the damaged area of the brain cannot work properly. This is different from a hemorrhagic (say \"yeu-kxn-BU-jg\") stroke, which happens when a blood vessel in the brain has burst open or has started to leak. The brain damage from a stroke starts within minutes. Quick treatment can help limit damage to the brain and make recovery more likely. People who have had a stroke may have a hard time talking, understanding things, and making decisions. They may have to relearn daily activities, such as how to eat, bathe, and dress. How well someone recovers from a stroke depends on how quickly the person gets to the hospital, where in the brain the stroke happened, and how severe it was. Training and therapy also make a difference in how well people recover. What are the symptoms? If you have any of these symptoms, call 911 or other emergency services right away: 
· You have symptoms of a stroke. These may include: 
¨ Sudden numbness, tingling, weakness, or loss of movement in your face, arm, or leg, especially on only one side of your body. ¨ Sudden vision changes. ¨ Sudden trouble speaking. ¨ Sudden confusion or trouble understanding simple statements. ¨ Sudden problems with walking or balance. ¨ A sudden, severe headache that is different from past headaches. See your doctor if you have symptoms that seem like a stroke, even if they go away quickly. You may have had a transient ischemic attack (TIA), sometimes called a mini-stroke. A TIA is a warning that a stroke may happen soon. Getting early treatment for a TIA can help prevent a stroke. What causes an ischemic stroke? An ischemic stroke is caused by a blood clot that blocks blood flow in the brain. The most common causes of blood clots include: 
· Hardening of the arteries (atherosclerosis). This is caused by high blood pressure, diabetes, high cholesterol, or smoking. · Atrial fibrillation. How is ischemic stroke treated? You may have to take several medicines, depending on what caused your stroke. Ask your doctor if a stroke rehab program is right for you. Rehab increases your chances of getting back some of the abilities you lost. 
How can you prevent another stroke? · Work with your doctor to treat any health problems you have. High blood pressure, high cholesterol, atrial fibrillation, and diabetes all raise your chances of having a stroke. · Be safe with medicines. Take your medicine exactly as prescribed. Call your doctor if you think you are having a problem with your medicine. · Have a healthy lifestyle. ¨ Do not smoke or allow others to smoke around you. If you need help quitting, talk to your doctor about stop-smoking programs and medicines. These can increase your chances of quitting for good. Smoking makes a stroke more likely. ¨ Limit alcohol to 2 drinks a day for men and 1 drink a day for women. ¨ Lose weight if you need to. A healthy weight will help you keep your heart and body healthy. ¨ Be active. Ask your doctor what type and level of activity is safe for you. ¨ Eat heart-healthy foods, like fruits, vegetables, and high-fiber foods. Follow-up care is a key part of your treatment and safety.  Be sure to make and go to all appointments, and call your doctor if you are having problems. It's also a good idea to know your test results and keep a list of the medicines you take. Where can you learn more? Go to http://vic-sony.info/. Enter D161 in the search box to learn more about \"Learning About an Ischemic Stroke. \" Current as of: November 21, 2017 Content Version: 11.7 © 1291-3673 [employer]. Care instructions adapted under license by [employer] (which disclaims liability or warranty for this information). If you have questions about a medical condition or this instruction, always ask your healthcare professional. [address] any warranty or liability for your use of this information. Sova Announcement We are excited to announce that we are making your provider's discharge notes available to you in Sova. You will see these notes when they are completed and signed by the physician that discharged you from your recent hospital stay. If you have any questions or concerns about any information you see in Sova, please call the Health Information Department where you were seen or reach out to your Primary Care Provider for more information about your plan of care. Introducing [facility] & HEALTH SERVICES! Harpreet Marquez introduces Sova patient portal. Now you can access parts of your medical record, email your doctor's office, and request medication refills online. 1. In your internet browser, go to [email or web address] 2. Click on the First Time User? Click Here link in the Sign In box. You will see the New Member Sign Up page. 3. Enter your Sova Access Code exactly as it appears below. You will not need to use this code after youve completed the sign-up process. If you do not sign up before the expiration date, you must request a new code.  
 
· Sova Access Code: [email or web address] 
 Expires: 11/28/2018  4:24 PM 
 
4. Enter the last four digits of your Social Security Number (xxxx) and Date of Birth (mm/dd/yyyy) as indicated and click Submit. You will be taken to the next sign-up page. 5. Create a [employer] ID. This will be your [employer] login ID and cannot be changed, so think of one that is secure and easy to remember. 6. Create a [employer] password. You can change your password at any time. 7. Enter your Password Reset Question and Answer. This can be used at a later time if you forget your password. 8. Enter your e-mail address. You will receive e-mail notification when new information is available in [address] 9. Click Sign Up. You can now view and download portions of your medical record. 10. Click the Download Summary menu link to download a portable copy of your medical information. If you have questions, please visit the Frequently Asked Questions section of the [employer] website. Remember, [employer] is NOT to be used for urgent needs. For medical emergencies, dial 911. Now available from your iPhone and Android! Introducing John Khoury As a Micaela Rubi patient, I wanted to make you aware of our electronic visit tool called John Khoury. Micaela Venegas 24/7 allows you to connect within minutes with a medical provider 24 hours a day, seven days a week via a mobile device or tablet or logging into a secure website from your computer. You can access John Khoury from anywhere in the [place]. A virtual visit might be right for you when you have a simple condition and feel like you just dont want to get out of bed, or cant get away from work for an appointment, when your regular Micaela Rubi provider is not available (evenings, weekends or holidays), or when youre out of town and need minor care.   Electronic visits cost only $49 and if the John Khoury provider determines a prescription is needed to treat your condition, one can be electronically transmitted to a nearby pharmacy*. Please take a moment to enroll today if you have not already done so. The enrollment process is free and takes just a few minutes. To enroll, please download the Cherl Grain 24/7 alex to your tablet or phone, or visit [email or web address] to enroll on your computer. And, as an [address] [place] [address] patient with a [employer] account, the results of your visits will be scanned into your electronic medical record and your primary care provider will be able to view the scanned results. We urge you to continue to see your regular [facility] provider for your ongoing medical care. And while your primary care provider may not be the one available when you seek a [employer] virtual visit, the peace of mind you get from getting a real diagnosis real time can be priceless. For more information on [employer], view our Frequently Asked Questions (FAQs) at [email or web address]. Sincerely, 
 
Rhonda Hanson MD 
Chief Medical Officer [address] [place] [address] [facility]is *:  certain medications cannot be prescribed via [employer] Unresulted Labs-Please follow up with your PCP about these lab tests Order Current Status DUPLEX CAROTID BILATERAL Preliminary result Providers Seen During Your Hospitalization Provider Specialty Primary office phone Micheal Cowan DO Emergency Medicine [phone] Gabriela Krishnamurthy MD Emergency Medicine [phone] Nevaeh Brady MD Internal Medicine [phone] Your Primary Care Physician (PCP) Primary Care Physician Office Phone Office Fax Dahlia Hopkins, [address] [phone] You are allergic to the following No active allergies Recent Documentation Height Weight BMI Smoking Status 1.854 m 105.2 kg 30.61 kg/m2 Former Smoker Emergency Contacts Name Discharge Info Relation Home Work Mobile Glenda Hayes DISCHARGE CAREGIVER [3] Spouse [3] [phone] Patient Belongings The following personal items are in your possession at time of discharge: 
  Dental Appliances: None         Home Medications: None   Jewelry: With patient  Clothing: At bedside    Other Valuables: None Please provide this summary of care documentation to your next provider. Signatures-by signing, you are acknowledging that this After Visit Summary has been reviewed with you and you have received a copy. Patient Signature:  ____________________________________________________________ Date:  ____________________________________________________________  
  
[facility] Provider Signature:  ____________________________________________________________ Date:  ____________________________________________________________

## 2018-08-29 NOTE — ED NOTES
4:36 PM 
I have evaluated the patient as the Provider in Triage. I have reviewed His vital signs and the triage nurse assessment. I have talked with the patient and any available family and advised that I am the provider in triage and have ordered the appropriate study to initiate their work up based on the clinical presentation during my assessment. I have advised that the patient will be accommodated in the Main ED as soon as possible. I have also requested to contact the triage nurse or myself immediately if the patient experiences any changes in their condition during this brief waiting period. Cruz Perkins,  Mildly slurred speech. Started at 6 pm yetserday. Mild R facial droop. Mild confusion and balance issues.

## 2018-08-29 NOTE — ED NOTES
TRANSFER - OUT REPORT: 
 
Verbal report given to Chey Callaway RN(name) on Matt Brush  being transferred to [facility](unit) for routine progression of care Report consisted of patients Situation, Background, Assessment and  
Recommendations(SBAR). Information from the following report(s) SBAR, ED Summary, [facility] and Recent Results was reviewed with the receiving nurse. Lines:  
Peripheral IV 08/29/18 Right Antecubital (Active) Site Assessment Clean, dry, & intact 8/29/2018  5:17 PM  
Phlebitis Assessment 0 8/29/2018  5:17 PM  
Infiltration Assessment 0 8/29/2018  5:17 PM  
Dressing Status Clean, dry, & intact 8/29/2018  5:17 PM  
Dressing Type Transparent 8/29/2018  5:17 PM  
Hub Color/Line Status Pink 8/29/2018  5:17 PM  
  
 
Opportunity for questions and clarification was provided. Patient transported with: 
 [employer]

## 2018-08-30 VITALS
OXYGEN SATURATION: 99 % | RESPIRATION RATE: 17 BRPM | HEART RATE: 52 BPM | DIASTOLIC BLOOD PRESSURE: 72 MMHG | HEIGHT: 73 IN | WEIGHT: 232 LBS | SYSTOLIC BLOOD PRESSURE: 125 MMHG | TEMPERATURE: 97.6 F | BODY MASS INDEX: 30.75 KG/M2

## 2018-08-30 LAB
ALBUMIN SERPL-MCNC: 3.6 G/DL (ref 3.5–5)
ALBUMIN/GLOB SERPL: 1.4 {RATIO} (ref 1.1–2.2)
ALP SERPL-CCNC: 50 U/L (ref 45–117)
ALT SERPL-CCNC: 19 U/L (ref 12–78)
ANION GAP SERPL CALC-SCNC: 9 MMOL/L (ref 5–15)
AST SERPL-CCNC: 16 U/L (ref 15–37)
ATRIAL RATE: 58 BPM
BILIRUB SERPL-MCNC: 1 MG/DL (ref 0.2–1)
BUN SERPL-MCNC: 16 MG/DL (ref 6–20)
BUN/CREAT SERPL: 20 (ref 12–20)
CALCIUM SERPL-MCNC: 8.4 MG/DL (ref 8.5–10.1)
CALCULATED P AXIS, ECG09: 48 DEGREES
CALCULATED R AXIS, ECG10: 28 DEGREES
CALCULATED T AXIS, ECG11: 37 DEGREES
CHLORIDE SERPL-SCNC: 106 MMOL/L (ref 97–108)
CHOLEST SERPL-MCNC: 87 MG/DL
CO2 SERPL-SCNC: 26 MMOL/L (ref 21–32)
CREAT SERPL-MCNC: 0.79 MG/DL (ref 0.7–1.3)
DIAGNOSIS, 93000: NORMAL
ERYTHROCYTE [DISTWIDTH] IN BLOOD BY AUTOMATED COUNT: 17.2 % (ref 11.5–14.5)
EST. AVERAGE GLUCOSE BLD GHB EST-MCNC: 143 MG/DL
EST. AVERAGE GLUCOSE BLD GHB EST-MCNC: 154 MG/DL
FERRITIN SERPL-MCNC: 81 NG/ML (ref 26–388)
FOLATE SERPL-MCNC: 16 NG/ML (ref 5–21)
GLOBULIN SER CALC-MCNC: 2.5 G/DL (ref 2–4)
GLUCOSE BLD STRIP.AUTO-MCNC: 164 MG/DL (ref 65–100)
GLUCOSE SERPL-MCNC: 150 MG/DL (ref 65–100)
HBA1C MFR BLD: 6.6 % (ref 4.2–6.3)
HBA1C MFR BLD: 7 % (ref 4.2–6.3)
HCT VFR BLD AUTO: 27.4 % (ref 36.6–50.3)
HDLC SERPL-MCNC: 43 MG/DL
HDLC SERPL: 2 {RATIO} (ref 0–5)
HEMOCCULT STL QL: NEGATIVE
HGB BLD-MCNC: 8.6 G/DL (ref 12.1–17)
IRON SATN MFR SERPL: 35 % (ref 20–50)
IRON SERPL-MCNC: 83 UG/DL (ref 35–150)
LDLC SERPL CALC-MCNC: 27.6 MG/DL (ref 0–100)
LIPID PROFILE,FLP: NORMAL
MAGNESIUM SERPL-MCNC: 1.6 MG/DL (ref 1.6–2.4)
MCH RBC QN AUTO: 18.7 PG (ref 26–34)
MCHC RBC AUTO-ENTMCNC: 31.4 G/DL (ref 30–36.5)
MCV RBC AUTO: 59.4 FL (ref 80–99)
NRBC # BLD: 0 K/UL (ref 0–0.01)
NRBC BLD-RTO: 0 PER 100 WBC
P-R INTERVAL, ECG05: 218 MS
PLATELET # BLD AUTO: 199 K/UL (ref 150–400)
POTASSIUM SERPL-SCNC: 3.1 MMOL/L (ref 3.5–5.1)
PROT SERPL-MCNC: 6.1 G/DL (ref 6.4–8.2)
Q-T INTERVAL, ECG07: 428 MS
QRS DURATION, ECG06: 108 MS
QTC CALCULATION (BEZET), ECG08: 420 MS
RBC # BLD AUTO: 4.61 M/UL (ref 4.1–5.7)
SERVICE CMNT-IMP: ABNORMAL
SODIUM SERPL-SCNC: 141 MMOL/L (ref 136–145)
TIBC SERPL-MCNC: 240 UG/DL (ref 250–450)
TRIGL SERPL-MCNC: 82 MG/DL (ref ?–150)
VENTRICULAR RATE, ECG03: 58 BPM
VIT B12 SERPL-MCNC: 257 PG/ML (ref 193–986)
VLDLC SERPL CALC-MCNC: 16.4 MG/DL
WBC # BLD AUTO: 6.5 K/UL (ref 4.1–11.1)

## 2018-08-30 PROCEDURE — 96374 THER/PROPH/DIAG INJ IV PUSH: CPT

## 2018-08-30 PROCEDURE — 36415 COLL VENOUS BLD VENIPUNCTURE: CPT | Performed by: INTERNAL MEDICINE

## 2018-08-30 PROCEDURE — 93880 EXTRACRANIAL BILAT STUDY: CPT

## 2018-08-30 PROCEDURE — 82607 VITAMIN B-12: CPT | Performed by: INTERNAL MEDICINE

## 2018-08-30 PROCEDURE — 74011250636 HC RX REV CODE- 250/636: Performed by: INTERNAL MEDICINE

## 2018-08-30 PROCEDURE — 82962 GLUCOSE BLOOD TEST: CPT

## 2018-08-30 PROCEDURE — 74011636637 HC RX REV CODE- 636/637: Performed by: INTERNAL MEDICINE

## 2018-08-30 PROCEDURE — 85027 COMPLETE CBC AUTOMATED: CPT | Performed by: INTERNAL MEDICINE

## 2018-08-30 PROCEDURE — 82272 OCCULT BLD FECES 1-3 TESTS: CPT | Performed by: INTERNAL MEDICINE

## 2018-08-30 PROCEDURE — 74011250637 HC RX REV CODE- 250/637: Performed by: INTERNAL MEDICINE

## 2018-08-30 PROCEDURE — 80053 COMPREHEN METABOLIC PANEL: CPT | Performed by: INTERNAL MEDICINE

## 2018-08-30 PROCEDURE — 97116 GAIT TRAINING THERAPY: CPT

## 2018-08-30 PROCEDURE — 83540 ASSAY OF IRON: CPT | Performed by: INTERNAL MEDICINE

## 2018-08-30 PROCEDURE — 97161 PT EVAL LOW COMPLEX 20 MIN: CPT

## 2018-08-30 PROCEDURE — 82728 ASSAY OF FERRITIN: CPT | Performed by: INTERNAL MEDICINE

## 2018-08-30 PROCEDURE — 82746 ASSAY OF FOLIC ACID SERUM: CPT | Performed by: INTERNAL MEDICINE

## 2018-08-30 PROCEDURE — 97112 NEUROMUSCULAR REEDUCATION: CPT

## 2018-08-30 PROCEDURE — 80061 LIPID PANEL: CPT | Performed by: INTERNAL MEDICINE

## 2018-08-30 PROCEDURE — 83735 ASSAY OF MAGNESIUM: CPT | Performed by: INTERNAL MEDICINE

## 2018-08-30 PROCEDURE — 92522 EVALUATE SPEECH PRODUCTION: CPT

## 2018-08-30 PROCEDURE — 83036 HEMOGLOBIN GLYCOSYLATED A1C: CPT | Performed by: INTERNAL MEDICINE

## 2018-08-30 RX ORDER — CLOPIDOGREL BISULFATE 75 MG/1
75 TABLET ORAL DAILY
Qty: 30 TAB | Refills: 0 | Status: SHIPPED | OUTPATIENT
Start: 2018-08-31 | End: 2019-01-07 | Stop reason: ALTCHOICE

## 2018-08-30 RX ORDER — FAMOTIDINE 20 MG/1
20 TABLET, FILM COATED ORAL 2 TIMES DAILY
Status: DISCONTINUED | OUTPATIENT
Start: 2018-08-30 | End: 2018-08-30 | Stop reason: HOSPADM

## 2018-08-30 RX ORDER — POTASSIUM CHLORIDE 750 MG/1
40 TABLET, FILM COATED, EXTENDED RELEASE ORAL EVERY 4 HOURS
Status: COMPLETED | OUTPATIENT
Start: 2018-08-30 | End: 2018-08-30

## 2018-08-30 RX ORDER — FAMOTIDINE 20 MG/1
20 TABLET, FILM COATED ORAL 2 TIMES DAILY
Qty: 60 TAB | Refills: 0 | Status: SHIPPED | OUTPATIENT
Start: 2018-08-30 | End: 2019-01-07 | Stop reason: ALTCHOICE

## 2018-08-30 RX ORDER — MAGNESIUM SULFATE HEPTAHYDRATE 40 MG/ML
2 INJECTION, SOLUTION INTRAVENOUS ONCE
Status: COMPLETED | OUTPATIENT
Start: 2018-08-30 | End: 2018-08-30

## 2018-08-30 RX ORDER — HYDROCHLOROTHIAZIDE 25 MG/1
25 TABLET ORAL DAILY
Status: DISCONTINUED | OUTPATIENT
Start: 2018-08-30 | End: 2018-08-30 | Stop reason: HOSPADM

## 2018-08-30 RX ORDER — LISINOPRIL 20 MG/1
20 TABLET ORAL DAILY
Status: DISCONTINUED | OUTPATIENT
Start: 2018-08-30 | End: 2018-08-30 | Stop reason: HOSPADM

## 2018-08-30 RX ADMIN — Medication 10 ML: at 14:09

## 2018-08-30 RX ADMIN — LISINOPRIL 20 MG: 20 TABLET ORAL at 12:36

## 2018-08-30 RX ADMIN — ROSUVASTATIN CALCIUM 10 MG: 10 TABLET, FILM COATED ORAL at 08:21

## 2018-08-30 RX ADMIN — AMLODIPINE BESYLATE 10 MG: 5 TABLET ORAL at 08:21

## 2018-08-30 RX ADMIN — Medication 10 ML: at 05:25

## 2018-08-30 RX ADMIN — MAGNESIUM SULFATE IN WATER 2 G: 40 INJECTION, SOLUTION INTRAVENOUS at 12:36

## 2018-08-30 RX ADMIN — HYDROCHLOROTHIAZIDE 25 MG: 25 TABLET ORAL at 12:36

## 2018-08-30 RX ADMIN — POTASSIUM CHLORIDE 40 MEQ: 750 TABLET, EXTENDED RELEASE ORAL at 16:39

## 2018-08-30 RX ADMIN — FAMOTIDINE 20 MG: 20 TABLET ORAL at 12:37

## 2018-08-30 RX ADMIN — ASPIRIN 81 MG: 81 TABLET, COATED ORAL at 08:21

## 2018-08-30 RX ADMIN — CLOPIDOGREL BISULFATE 75 MG: 75 TABLET ORAL at 08:21

## 2018-08-30 RX ADMIN — METFORMIN HYDROCHLORIDE 1000 MG: 500 TABLET, EXTENDED RELEASE ORAL at 08:22

## 2018-08-30 RX ADMIN — POTASSIUM CHLORIDE 40 MEQ: 750 TABLET, EXTENDED RELEASE ORAL at 12:37

## 2018-08-30 RX ADMIN — ESCITALOPRAM OXALATE 5 MG: 10 TABLET ORAL at 08:21

## 2018-08-30 RX ADMIN — INSULIN LISPRO 2 UNITS: 100 INJECTION, SOLUTION INTRAVENOUS; SUBCUTANEOUS at 08:22

## 2018-08-30 NOTE — PROCEDURES
[address]  *** FINAL REPORT ***    Name: Adrianna Odonnell  MRN: [id number]    Inpatient  : 18 Mar 1945  HIS Order #: [id number]  [address] [facility] Visit #: [id number]  Date: 30 Aug 2018    TYPE OF TEST: Cerebrovascular Duplex    REASON FOR TEST  Cerebrovascular accident    Right Carotid:-             Proximal               Mid                 Distal  cm/s  Systolic  Diastolic  Systolic  Diastolic  Systolic  Diastolic  CCA:    463.2      13.6                            66.1      19.2  Bulb:  ECA:    100.0      12.8  ICA:     56.3      16.0       66.0      19.2       53.1      14.4  ICA/CCA:  0.9       0.8    ICA Stenosis: <50%    Right Vertebral:-  Finding: Antegrade  Sys:       46.5  Sosa:       12.8    Right Subclavian:    Left Carotid:-            Proximal                Mid                 Distal  cm/s  Systolic  Diastolic  Systolic  Diastolic  Systolic  Diastolic  CCA:     15.6      21.5                            80.6      23.5  Bulb:  ECA:     83.1       8.9  ICA:     66.3      15.7       61.2      14.4       40.6      10.9  ICA/CCA:  0.8       0.7    ICA Stenosis: <50%    Left Vertebral:-  Finding: Antegrade  Sys:       48.2  Sosa:       12.0    Left Subclavian:    INTERPRETATION/FINDINGS  PROCEDURE:  Evaluation of the extracranial cerebrovascular arteries  with ultrasound (B-mode imaging, pulsed Doppler, color Doppler). Includes the common carotid, internal carotid, external carotid, and  vertebral arteries. FINDINGS: Bilateral intimal thickening noted within the common carotid   arteries. Heterogenous plaque identified within the right distal  common carotid and the right bulb. IMPRESSION: Findings are consistent with 0-49% stenosis of the right  internal carotid and 0-49% stenosis of the left internal carotid. Vertebrals are patent with antegrade flow. ADDITIONAL COMMENTS    I have personally reviewed the data relevant to the interpretation of  this  study.     TECHNOLOGIST: Felx Coles, RVT  Signed: 08/30/2018 11:42 AM    PHYSICIAN: Umesh Burger.  Nicholas Elliott MD  Signed: 08/31/2018 08:42 AM

## 2018-08-30 NOTE — PROGRESS NOTES
Notified of critical MRI result by Benson Estrada at [facility]. Results called to Dr Ja Nagy.

## 2018-08-30 NOTE — PROGRESS NOTES
CM Note: 
Reason for Admission:   CVA RRAT Score:     18 Do you (patient/family) have any concerns for transition/discharge? no 
           
Plan for utilizing home health:     Evals and recs to be completed. Had home health about 1.5 yrs ago. Likelihood of readmission?   low Transition of Care Plan:       
Plan is for pt's wife to transport him home at d/c. He has Rx coverage and gets his medicaitons from [employer] on [address]. His PCP is Dr. Juan Alvarado. No NN. PTA pt was independent with ADL's, was driving and walked unaided. He did OP cardiac rehab over a year ago. All DME at home but not needed. His emergency contact is his wife, Fay Cade ([phone]), who will transport him at d/c. DEVI Cedillo Care Management Interventions PCP Verified by CM: Yes 
Palliative Care Criteria Met (RRAT>21 & CHF Dx)?: No 
MyChart Signup: No 
Discharge Durable Medical Equipment: No 
Physical Therapy Consult: Yes Occupational Therapy Consult: Yes Speech Therapy Consult: Yes Current Support Network: Lives with Spouse, Own Home Confirm Follow Up Transport: Family Plan discussed with Pt/Family/Caregiver: Yes Discharge Location Discharge Placement: Home with one level

## 2018-08-30 NOTE — PROGRESS NOTES
Medical Progress Note NAME: Adolfo Lockhart :  1945 MRM:  [id number] Date/Time: 2018  10:45 AM 
 
  
Assessment / Plan:  
 
CVA (cerebral vascular accident) ([facility] (2018): Suspected by dysarthria, right facial droop, and right sided tongue deviation. Head CT with age indeterminant lesions. MRI confirms acute or subacute focal nonhemorrhagic ischemia in the left periventricular deep white matter. Appreciate neuro evaluation. -- neuro checks -- carotid doppler 
 -- echocardiogram 
 -- continue Plavix and ASA x 3 months, then just ASA 
 -- PT / OT / Speech evaluation, may need outpatient 
  
HTN (hypertension), benign (2011):  BP elevated overnight. -- continue amlodipine -- restart lisinopril / HCTZ 
  
Type 2 diabetes mellitus without complication ([facility] (): Has been under good control. Last A1c was under 6 per patient. -- continue metformin -- continue SSI 
  
CAD (coronary artery disease) (2017): S/P CABG. -- continue low dose ASA and Plavix 
  
Dyslipidemia (3/17/2017): Well controlled. -- continue crestor 
  
Anemia:  Microcytic by labs. Similar to 2017, but this was post op CABG. Likely due to thalassemia. -- check iron, b12, folate -- await hemoccult stool Hypokalemia: 
 -- replete with PO Subjective: Chief Complaint:  Feels well. Speech about the same. Has been ambulatory in room. ROS: 
(bold if positive, if negative) Objective:  
 
 
Vitals:  
 
 
  
Last 24hrs VS reviewed since prior progress note. Most recent are: 
 
Visit Vitals  /81 (BP 1 Location: Left arm, BP Patient Position: At rest)  Pulse (!) 51  Temp 98.4 °F (36.9 °C)  Resp 16  
 Ht 6' 1\" (1.854 m)  Wt 105.2 kg (232 lb)  SpO2 97%  BMI 30.61 kg/m2 SpO2 Readings from Last 6 Encounters:  
18 97% 18 98% 17 97% 17 99% 17 98% 17 98% Intake/Output Summary (Last 24 hours) at 08/30/18 1045 Last data filed at 08/30/18 9944 Gross per 24 hour Intake                0 ml Output              300 ml Net             -300 ml Exam:  
 
Physical Exam: 
 
Gen:  Well-developed, well-nourished, in no acute distress HEENT:  Pink conjunctivae, hearing intact to voice, moist mucous membranes Resp:  No accessory muscle use, clear breath sounds without wheezes rales or rhonchi 
Card:  No murmurs, normal S1, S2 without thrills or peripheral edema Abd:  Soft, non-tender, non-distended, normoactive bowel sounds are present Skin:  No rashes Neuro:  Right facial droop, speech fluent,  strength is 5/5 bilaterally and dorsi / plantarflexion is 5/5 bilaterally, follows commands appropriately Psych:  Good insight, oriented to person, place and time, alert Telemetry reviewed:   normal sinus rhythm Medications Reviewed: (see below) Lab Data Reviewed: (see below) 
 
______________________________________________________________________ Medications:  
 
Current Facility-Administered Medications Medication Dose Route Frequency  potassium chloride SR (KLOR-CON 10) tablet 40 mEq  40 mEq Oral Q4H  
 magnesium sulfate 2 g/50 ml IVPB (premix or compounded)  2 g IntraVENous ONCE  
 famotidine (PEPCID) tablet 20 mg  20 mg Oral BID  
 . PHARMACY TO SUBSTITUTE PER PROTOCOL (Reordered from: lisinopril-hydroCHLOROthiazide (PRINZIDE, ZESTORETIC) 20-25 mg per tablet)    Per Protocol  sodium chloride (NS) flush 5-10 mL  5-10 mL IntraVENous Q8H  
 sodium chloride (NS) flush 5-10 mL  5-10 mL IntraVENous PRN  
 ondansetron (ZOFRAN) injection 4 mg  4 mg IntraVENous Q6H PRN  
 acetaminophen (TYLENOL) tablet 650 mg  650 mg Oral Q4H PRN Or  
 acetaminophen (TYLENOL) solution 650 mg  650 mg Per NG tube Q4H PRN  Or  
 acetaminophen (TYLENOL) suppository 650 mg  650 mg Rectal Q4H PRN  
  enoxaparin (LOVENOX) injection 40 mg  40 mg SubCUTAneous Q24H  clopidogrel (PLAVIX) tablet 75 mg  75 mg Oral DAILY  insulin lispro (HUMALOG) injection   SubCUTAneous AC&HS  
 glucose chewable tablet 16 g  4 Tab Oral PRN  
 dextrose (D50W) injection syrg 12.5-25 g  25-50 mL IntraVENous PRN  
 glucagon (GLUCAGEN) injection 1 mg  1 mg IntraMUSCular PRN  
 escitalopram oxalate (LEXAPRO) tablet 5 mg  5 mg Oral DAILY  metFORMIN ER (GLUCOPHAGE XR) tablet 1,000 mg  1,000 mg Oral DAILY  rosuvastatin (CRESTOR) tablet 10 mg  10 mg Oral DAILY  amLODIPine (NORVASC) tablet 10 mg  10 mg Oral DAILY  aspirin delayed-release tablet 81 mg  81 mg Oral DAILY Lab Review:  
 
Recent Labs 08/30/18 42-30-72-51 08/29/18 
 1652 WBC  6.5  7.7 HGB  8.6*  9.1*  
HCT  27.4*  29.4*  
PLT  199  207 Recent Labs 08/30/18 42-30-72-51 08/29/18 
 1652 NA  141  140  
K  3.1*  3.5 CL  106  106 CO2  26  26 GLU  150*  212* BUN  16  20 CREA  0.79  1.06  
CA  8.4*  8.5 MG  1.6   --   
ALB  3.6   --   
TBILI  1.0   --   
SGOT  16   --   
ALT  19   --   
 
Lab Results Component Value Date/Time Glucose (POC) 164 (H) 08/30/2018 07:08 AM  
 Glucose (POC) 161 (H) 08/29/2018 09:25 PM  
 Glucose (POC) 141 (H) 01/24/2017 06:08 AM  
 Glucose (POC) 164 (H) 01/23/2017 09:18 PM  
 Glucose (POC) 141 (H) 01/23/2017 04:46 PM  
 
 
              
Care Plan discussed with: Patient Discussed:  Care Plan Prophylaxis:  Lovenox Disposition:  Home w/Family 
        
___________________________________________________ Attending Physician: Bill Lynn MD

## 2018-08-30 NOTE — PROGRESS NOTES
Speech LAnguage Pathology evaluation/discharge Patient: Oksana Ruiz (89 y.o. male) Date: 8/30/2018 Primary Diagnosis: CVA (cerebral vascular accident) ([facility] Precautions: aspiration  Fall ASSESSMENT : 
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 Swallow eval with small amounts of PO WFL, but RN and wife report significant coughing at lunch. HE does have a moderate R facial droop and mild lingual deviation. Expect speech issues to improve quickly, but may need OP MBS if not improving. . 
pateint admitted with R facial droop, dysarthria, dysphagia. Head CT: R basal ganglion infarct and white matter disease. PMH: HTN< DM<CAD< HLD< DJD< CABG, +h/o tobacco.  
Skilled therapy provided by a speech-language pathologist is not indicated at this time. PLAN : 
Recommendations: 
Ok for regular diet, thin liquids. OP MBS if needed. Discharge Recommendations: Outpatient if not improved SUBJECTIVE:  
Patient's wife \"Does he need OP Speech? . OBJECTIVE:  
 
Past Medical History:  
Diagnosis Date  CAD (coronary artery disease) 01/23/2017  
 stent  DJD (degenerative joint disease) of knee 11/2013  
 left TKR  ED (erectile dysfunction)  Essential hypertension  Hyperlipidemia  T2DM (type 2 diabetes mellitus) ([facility]  Thalassemia minor Past Surgical History:  
Procedure Laterality Date  CABG, ARTERY-VEIN, SINGLE  01/19/2017 DAVINCI SABINE TAKEDOWN, LEFT ANTERIOR THORACOTOMY, OFF PUMP CORONARY ARTERY BYPASS GRAFTING X1,  
 HX HEART CATHETERIZATION  2004  
 mild CAD, EF 60-65%  HX KNEE ARTHROSCOPY Left 11/2013  HX KNEE REPLACEMENT Left 2013 [address] [place] [address]  STRESS TEST CARDIOLITE  5/14/2004  
 5 min, inferior ischemia, EF 60%  STRESS TEST CARDIOLITE  6/30/11  
 4 min, normal perfusion EF 50% Prior Level of Function/Home Situation:  
Home Situation Home Environment: Private residence # Steps to Enter: 5 Rails to Enter: Yes Hand Rails : Right One/Two Story Residence: One story Living Alone: No 
Support Systems: Spouse/Significant Other/Partner Patient Expects to be Discharged to[de-identified] Private residence Current DME Used/Available at Home: Walker, rolling, Cane, straight Mental Status: 
Neurologic State: Alert Orientation Level: Oriented X4 Cognition: Appropriate decision making (mild delay in word-retrieval and thought organization. no dysarthria. mild speech apraxia onDDk) Perception: Appears intact Motor Speech: 
 DDK:  Mild speech apraxia on bilabials. Otherwise [facility] Language Comprehension and Expression: Auditory Comprehension Auditory Impairment: No  
Verbal Expression Primary Mode of Expression: Verbal 
 occasional delay in thought organzation for conversation. Some use of vague terms. Divergent naming: 10 per category, but slow. Neuro-Linguistics: 
  
  
  
 swallowing:   
Patient took soda and stephany cracker without s/s aspiration . Mild weakness. Wife and RN report significant coughing at meals, but he was eating fast.  
Watch for s/s choking at home. Patient reports drooling yesterday. Pragmatics: 
  
  
Voice: 
  
  
  
  
  
  
  
  
  
  
  
  
  
  
  
 
 
G Codes: In compliance with CMSs Claims Based Outcome Reporting, the following G-code set was chosen for this patient based the use of the NOMS functional outcome to quantify this patient's level of motor speech impairment. Using the NOMS, the patient was determined to be at level 6 for motor speech function which correlates with the CI= 1-19% level of severity. Based on the objective assessment provided within this note, the current, goal, and discharge g-codes are as follows: 
 
Swallow  Swallowing: 
 Swallow Current Status CI= 1-19%  Swallow Goal Status CI= 1-19%  Swallow D/C Status CI= 1-19% NOMS: The NOMS functional outcome measure was used to quantify this patient's level of motor speech impairment. Based on the NOMS, the patient was determined to be at level 6 for motor speech function. NOMS Motor Speech: 
Level 1 (CN):  100% unintelligible Level 2 (CM):  Communication partner responsible for message; can do CV or automatic words w/ max cues but rarely intelligible in context Level 3 (CL): communication partner primarily responsible for message but says CV/automatic words intelligibly; mod cues to say simple words/phrases Level 4 (CK): In structured conversation with familiar listener can say simple words and phrases. Mod cues for simple sentences Level 5 (CJ):  Uses simple sentences for ADLs with familiar and unfamiliar listener; min cues for complex sentences Level 6 (CI):  Intelligible in ADLs; difficulty in voc/social activites; rare cues for complex message; uses comp strategies Level 7 (CH):  Intelligible in all activities. May occasionally use compensatory strategies. DENICE. (2003). National Outcomes Measurement System (NOMS): Adult Speech-Language Pathology User's Guide. Pain: 
Pain Scale 1: Numeric (0 - 10) Pain Intensity 1: 0 After treatment:  
[]   Patient left in no apparent distress sitting up in chair 
[]   Patient left in no apparent distress in bed 
[]   Call bell left within reach [x]   Nursing notified 
[x]   Caregiver present 
[]   Bed alarm activated COMMUNICATION/EDUCATION:  
The patients plan of care including findings and recommendations was discussed with: Registered Nurse, Physician and . Patient was educated regarding His deficit(s) of mild speech, language and swallowing issues as this relates to His diagnosis of CVA. He demonstrated Good understanding as evidenced by discussion. iLz Moran [x]   Patient/family have participated as able and agree with findings and recommendations. []   Patient is unable to participate in plan of care at this time. Thank you for this referral. 
Ariel Beck, SLP Time Calculation: 20 mins

## 2018-08-30 NOTE — DISCHARGE INSTRUCTIONS
HOSPITALIST DISCHARGE INSTRUCTIONS  NAME: Iban Curiel   :  1945   MRN:  [id number]     Date/Time:  2018 1:43 PM    ADMIT DATE: 2018     DISCHARGE DATE: 2018     DISCHARGE DIAGNOSIS:  Stroke    MEDICATIONS:    · It is important that you take the medication exactly as they are prescribed. · Keep your medication in the bottles provided by the pharmacist and keep a list of the medication names, dosages, and times to be taken in your wallet. · Do not take other medications without consulting your doctor. What to do at Home:  1. Check your blood pressure at home twice a day. Call your doctor for blood pressure greater than 160/90 or lower than 110/55 or if you have dizziness or lightheadedness. 2.  Check you blood sugar twice a day. Call your doctor for blood sugar persistently greater than 200 or lower than 80. Recommended diet:  Cardiac Diet and Diabetic Diet    Recommended activity: Activity as tolerated    If you experience any of the following symptoms then please call your primary care physician or return to the emergency room if you cannot get hold of your doctor:  Fever, chills, nausea, vomiting, diarrhea, change in mentation, falling, bleeding, shortness of breath, numbness, weakness, worsened speech or swallowing. Follow Up: Follow-up Information     Follow up With Details Comments [address] MD In 1 week  [address]  [address], [place] In 4 weeks  Jorge  Freddy Parkinson [phone]            Information obtained by :  I understand that if any problems occur once I am at home I am to contact my physician. I understand and acknowledge receipt of the instructions indicated above.                                                                                                                                            Physician's or R.N.'s Signature Date/Time                                                                                                                                              Patient or Representative Signature                                                          Date/Time       Learning About an Ischemic Stroke  What is an ischemic stroke? An ischemic (say \"Rajesh\") stroke occurs when a blood clot blocks a blood vessel in the brain. This means that blood cannot flow to some part of the brain. Without blood and the oxygen it carries, this part of the brain starts to die. The part of the body controlled by the damaged area of the brain cannot work properly. This is different from a hemorrhagic (say \"bze-rzq-OV-jg\") stroke, which happens when a blood vessel in the brain has burst open or has started to leak. The brain damage from a stroke starts within minutes. Quick treatment can help limit damage to the brain and make recovery more likely. People who have had a stroke may have a hard time talking, understanding things, and making decisions. They may have to relearn daily activities, such as how to eat, bathe, and dress. How well someone recovers from a stroke depends on how quickly the person gets to the hospital, where in the brain the stroke happened, and how severe it was. Training and therapy also make a difference in how well people recover. What are the symptoms? If you have any of these symptoms, call 911 or other emergency services right away:  · You have symptoms of a stroke. These may include:  ¨ Sudden numbness, tingling, weakness, or loss of movement in your face, arm, or leg, especially on only one side of your body. ¨ Sudden vision changes. ¨ Sudden trouble speaking. ¨ Sudden confusion or trouble understanding simple statements. ¨ Sudden problems with walking or balance. ¨ A sudden, severe headache that is different from past headaches.   See your doctor if you have symptoms that seem like a stroke, even if they go away quickly. You may have had a transient ischemic attack (TIA), sometimes called a mini-stroke. A TIA is a warning that a stroke may happen soon. Getting early treatment for a TIA can help prevent a stroke. What causes an ischemic stroke? An ischemic stroke is caused by a blood clot that blocks blood flow in the brain. The most common causes of blood clots include:  · Hardening of the arteries (atherosclerosis). This is caused by high blood pressure, diabetes, high cholesterol, or smoking. · Atrial fibrillation. How is ischemic stroke treated? You may have to take several medicines, depending on what caused your stroke. Ask your doctor if a stroke rehab program is right for you. Rehab increases your chances of getting back some of the abilities you lost.  How can you prevent another stroke? · Work with your doctor to treat any health problems you have. High blood pressure, high cholesterol, atrial fibrillation, and diabetes all raise your chances of having a stroke. · Be safe with medicines. Take your medicine exactly as prescribed. Call your doctor if you think you are having a problem with your medicine. · Have a healthy lifestyle. ¨ Do not smoke or allow others to smoke around you. If you need help quitting, talk to your doctor about stop-smoking programs and medicines. These can increase your chances of quitting for good. Smoking makes a stroke more likely. ¨ Limit alcohol to 2 drinks a day for men and 1 drink a day for women. ¨ Lose weight if you need to. A healthy weight will help you keep your heart and body healthy. ¨ Be active. Ask your doctor what type and level of activity is safe for you. ¨ Eat heart-healthy foods, like fruits, vegetables, and high-fiber foods. Follow-up care is a key part of your treatment and safety. Be sure to make and go to all appointments, and call your doctor if you are having problems.  It's also a good idea to know your test results and keep a list of the medicines you take. Where can you learn more? Go to http://vic-sony.info/. Enter D161 in the search box to learn more about \"Learning About an Ischemic Stroke. \"  Current as of: November 21, 2017  Content Version: 11.7  © 2397-8216 [employer]. Care instructions adapted under license by [employer] (which disclaims liability or warranty for this information). If you have questions about a medical condition or this instruction, always ask your healthcare professional. [place] [address] any warranty or liability for your use of this information.

## 2018-08-30 NOTE — PROGRESS NOTES
Occupational Therapy Note 1133 Orders acknowledged, chart reviewed. Spoke with PT, patient is at his functional baseline with no acute deficits. Met with wife in room (patient MARCIAL for testing), who is an OT. Stating she has no concerns and acute OT evaluation is not indicated at this time. Will complete orders. Thank you.  
 
Saturnino Grimes, OT

## 2018-08-30 NOTE — PROGRESS NOTES
physical Therapy neuro EVALUATION/discharge Patient: Elinor Gayle (34 y.o. male) Date: 8/30/2018 Primary Diagnosis: CVA (cerebral vascular accident) ([facility] [address] Precautions:   Fall ASSESSMENT : 
Based on the objective data described below, the patient presents with transient episode Right facial droop, balance instability and dysarthria. Patient reports no previous episodes of stroke like symptoms. Patient prior to admission lives with his wife in a 1 story home with 5 steps to enter. He requires no assistive device prior. CT shows a chronic Right basal ganglia infarct and MRI shows a subacute white matter infarct. Today, patient presenting with symmetrical strength. LUO balance score of 53/56 indicating low fall risk. Patient is Independent for all upright mobility. He demonstrates no loss of balance. Patient was educated on all signs and symptoms of CVA including BE FAST and to call for EMS if symptoms present. Skilled physical therapy is not indicated at this time. PLAN : 
Discharge Recommendations: None Further Equipment Recommendations for Discharge: none SUBJECTIVE:  
Patient stated thanks for telling me all this.  OBJECTIVE DATA SUMMARY:  
HISTORY:   
Past Medical History:  
Diagnosis Date  CAD (coronary artery disease) 01/23/2017  
 stent  DJD (degenerative joint disease) of knee 11/2013  
 left TKR  ED (erectile dysfunction)  Essential hypertension  Hyperlipidemia  T2DM (type 2 diabetes mellitus) ([facility] [address]  Thalassemia minor Past Surgical History:  
Procedure Laterality Date  CABG, ARTERY-VEIN, SINGLE  01/19/2017 DAVINCI SABINE TAKEDOWN, LEFT ANTERIOR THORACOTOMY, OFF PUMP CORONARY ARTERY BYPASS GRAFTING X1,  
 HX HEART CATHETERIZATION  2004  
 mild CAD, EF 60-65%  HX KNEE ARTHROSCOPY Left 11/2013  HX KNEE REPLACEMENT Left 2013 [address] [place] [address]  STRESS TEST CARDIOLITE  5/14/2004 5 min, inferior ischemia, EF 60%  STRESS TEST CARDIOLITE  11  
 4 min, normal perfusion EF 50% Prior Level of Function/Home Situation:  
Personal factors and/or comorbidities impacting plan of care:  
 
Home Situation Home Environment: Private residence # Steps to Enter: 5 Rails to Enter: Yes Hand Rails : Right One/Two Story Residence: One story Living Alone: No 
Support Systems: Spouse/Significant Other/Partner Patient Expects to be Discharged to[de-identified] Private residence Current DME Used/Available at Home: Walker, rolling, Cane, straight EXAMINATION/PRESENTATION/DECISION MAKING:  
Critical Behavior: 
Neurologic State: Alert, Appropriate for age, Eyes open spontaneously Orientation Level: Oriented X4 Cognition: Appropriate decision making, Appropriate for age attention/concentration, Appropriate safety awareness Hearing: Auditory Auditory Impairment: Hard of hearing, bilateral 
Skin:  All exposed intact Edema: none noted Range Of Motion: 
AROM: Within functional limits PROM: Within functional limits Strength:   
Strength: Within functional limits Tone & Sensation:  
Tone: Normal 
  
  
  
  
Sensation: Intact Coordination: 
Coordination: Within functional limits Vision:  
  
Functional Mobility: 
Bed Mobility: 
Rolling: Independent Supine to Sit: Independent Sit to Supine: Independent Transfers: 
Sit to Stand: Independent Stand to Sit: Independent Bed to Chair: Independent Balance:  
Sitting: Intact Standing: Intact Ambulation/Gait Training: 
Distance (ft): 350 Feet (ft) Assistive Device: Gait belt Ambulation - Level of Assistance: Independent Gait Description (WDL): Exceptions to [facility] Stair Training: 
  
  
  
 
  
Therapeutic Exercises:  
 
 
Functional Measure: 
Horn Balance Test: 
 
Sitting to Standin Standing Unsupported: 4 Sitting with Back Unsupported: 4 Standing to Sittin Transfers: 4 Standing Unsupported with Eyes Closed: 4 Standing Unsupported with Feet Together: 4 Reach Forward with Outstretched Arm: 3  Object: 4 Turn to Look Over Shoulders: 4 Turn 360 Degrees: 4 Alternate Foot on Step/Stool: 4 Standing Unsupported One Foot in Front: 3 Stand on One Leg: 3 Total: 53 
  
 
 
56=Maximum possible score;  
0-20=High fall risk 21-40=Moderate fall risk 41-56=Low fall risk Luo Balance Test and G-code impairment scale: 
Percentage of Impairment CH 
 
0% 
 CI 
 
1-19% CJ 
 
20-39% CK 
 
40-59% CL 
 
60-79% CM 
 
80-99% CN  
 
100% Claudette Arashon Score 0-56 56 45-55 34-44 23-33 12-22 1-11 0  
 
G codes: In compliance with CMSs Claims Based Outcome Reporting, the following G-code set was chosen for this patient based on their primary functional limitation being treated: The outcome measure chosen to determine the severity of the functional limitation was the LUO with a score of 53/56 which was correlated with the impairment scale. ? Mobility - Walking and Moving Around:  
  - CURRENT STATUS: CI - 1%-19% impaired, limited or restricted  - GOAL STATUS: CI - 1%-19% impaired, limited or restricted  - D/C STATUS:  CI - 1%-19% impaired, limited or restricted Physical Therapy Evaluation Charge Determination History Examination Presentation Decision-Making MEDIUM  Complexity : 1-2 comorbidities / personal factors will impact the outcome/ POC  LOW Complexity : 1-2 Standardized tests and measures addressing body structure, function, activity limitation and / or participation in recreation  LOW Complexity : Stable, uncomplicated  Other outcome measures LUO  LOW Based on the above components, the patient evaluation is determined to be of the following complexity level: LOW Pain: 
Pain Scale 1: Numeric (0 - 10) Pain Intensity 1: 0 Activity Tolerance: No complications with upright activity Please refer to the flowsheet for vital signs taken during this treatment. After treatment:  
[]         Patient left in no apparent distress sitting up in chair 
[x]         Patient left in no apparent distress in bed 
[x]         Call bell left within reach [x]         Nursing notified 
[]         Caregiver present 
[]         Bed alarm activated COMMUNICATION/EDUCATION:  
Patient was educated regarding His deficit(s) of slurred speech and facial droop as this relates to His diagnosis of TIA. He demonstrated Good understanding as evidenced by verbal feedback. Patient and/or family was verbally educated on the BE FAST acronym for signs/symptoms of CVA and TIA. BE FAST was written on patient's communication board  for visual education and reinforcement. All questions answered with patient indicating good understanding. [x]   Fall prevention education was provided and the patient/caregiver indicated understanding. [x]   Patient/family have participated as able and agree with findings and recommendations. []   Patient is unable to participate in plan of care at this time. Findings and recommendations were discussed with: Registered Nurse Thank you for this referral. 
Shawanda Pro, PT, DPT Time Calculation: 25 mins

## 2018-08-30 NOTE — PROGRESS NOTES
Stroke booklet given earlier on today for patient and family review. Patient discharged home with wife. Both state that they understand discharge instructions and follow up information. Will be wheeled out to car in w/c.

## 2018-08-30 NOTE — CONSULTS
NEUROLOGY IN-PATIENT NEW CONSULTATION      8/30/2018    RE: Shelbi Dubon         1945      REFERRED BY:  Johanna Escoto MD      CHIEF COMPLAINT:  This is Shelbi Dubon is a 68 y.o. male right handed  who had concerns including Aphasia and Facial Droop. HPI:     Patient was at [place] and at the afternoon, was not feeling well, felt lightheaded and off balanced. Then, patient noted word-finding difficulty. Patient went home, but the next day noted some problem swallowing prompting a visit to the ER.     (-) weakness/ numbness  (-) headache  Takes Cialis every day    ROS   (-) fever  (-) rash  All other systems reviewed and are negative    Past Medical Hx  Past Medical History:   Diagnosis Date    CAD (coronary artery disease) 01/23/2017    stent    DJD (degenerative joint disease) of knee 11/2013    left TKR    ED (erectile dysfunction)     Essential hypertension     Hyperlipidemia     T2DM (type 2 diabetes mellitus) ([facility] [address]     Thalassemia minor        Social Hx  Social History     Social History    Marital status:      Spouse name: N/A    Number of children: N/A    Years of education: N/A     Social History Main Topics    Smoking status: Former Smoker     Packs/day: 2.00     Years: 15.00     Quit date: 11/4/1981    Smokeless tobacco: Never Used      Comment: quit > 30 years ago    Alcohol use Yes      Comment: rare social     Drug use: No    Sexual activity: Not on file     Other Topics Concern    Not on file     Social History Narrative       Family Hx  Family History   Problem Relation Age of Onset    Cancer Mother     Diabetes Maternal Grandfather     Hypertension Maternal Grandfather     Other Brother      kathy cardiac    Diabetes Brother     Heart Disease Brother        ALLERGIES  No Known Allergies    CURRENT MEDS  Current Facility-Administered Medications   Medication Dose Route Frequency Provider Last Rate Last Dose    sodium chloride (NS) flush 5-10 mL  5-10 mL IntraVENous Quinton Diallo MD   10 mL at 08/30/18 0525    sodium chloride (NS) flush 5-10 mL  5-10 mL IntraVENous PRN Ese Martinez MD        ondansetron [facility]) injection 4 mg  4 mg IntraVENous Q6H PRN Ese Martinez MD        acetaminophen (TYLENOL) tablet 650 mg  650 mg Oral Q4H PRN Ese Martinez MD        Or    acetaminophen (TYLENOL) solution 650 mg  650 mg Per NG tube Q4H PRN Ese Martinez MD        Or    acetaminophen (TYLENOL) suppository 650 mg  650 mg Rectal Q4H PRN Ese Martinez MD        enoxaparin (LOVENOX) injection 40 mg  40 mg SubCUTAneous Q24H Ese Martinez MD   40 mg at 08/29/18 2030    clopidogrel (PLAVIX) tablet 75 mg  75 mg Oral DAILY Ese Martinez MD   75 mg at 08/30/18 2580    insulin lispro (HUMALOG) injection   SubCUTAneous AC&HS Ese Martinez MD   2 Units at 08/30/18 5404    glucose chewable tablet 16 g  4 Tab Oral PRN Ese Martinez MD        dextrose (D50W) injection syrg 12.5-25 g  25-50 mL IntraVENous PRN Ese Martinez MD        glucagon [facility]) injection 1 mg  1 mg IntraMUSCular PRN Ese Martinez MD        escitalopram oxalate (LEXAPRO) tablet 5 mg  5 mg Oral DAILY Ese Martinez MD   5 mg at 08/30/18 8194    metFORMIN ER (GLUCOPHAGE XR) tablet 1,000 mg  1,000 mg Oral DAILY Ese Martinez MD   1,000 mg at 08/30/18 0653    rosuvastatin (CRESTOR) tablet 10 mg  10 mg Oral DAILY Ese Martinez MD   10 mg at 08/30/18 2511    amLODIPine (NORVASC) tablet 10 mg  10 mg Oral DAILY Ese Martinez MD   10 mg at 08/30/18 0962    aspirin delayed-release tablet 81 mg  81 mg Oral DAILY Ese Martinez MD   81 mg at 08/30/18 7340           PREVIOUS WORKUP: (reviewed)  IMAGING:    CT Results (recent):    Results from [place] encounter on 08/29/18   CT HEAD WO CONT   Narrative EXAM:  CT HEAD WO CONT    INDICATION:   R side facial droop.  slurred speech.  22.5 hours. Onset of  confusion, aphasia and right facial droop began yesterday.  Slurred speech began  at 6:00 PM yesterday. COMPARISON: None. CONTRAST:  None. TECHNIQUE: Unenhanced CT of the head was performed using 5 mm images. Brain and  bone windows were generated. CT dose reduction was achieved through use of a  standardized protocol tailored for this examination and automatic exposure  control for dose modulation. FINDINGS:  Generalized prominence of cerebral sulci and ventricles is mildly increased but  commensurate with age. . Periventricular white matter low-density is minimal, but  there are focal low densities in the left paraventricular deep white matter  (centrum semiovale) and in the right basal ganglia region. There is no mass  effect. . There is no intracranial hemorrhage, extra-axial collection, mass, mass  effect or midline shift. The basilar cisterns are open. No acute infarct is  identified. The bone windows demonstrate no abnormalities. Retention cyst is  suggested in the right maxillary sinus. There are no air-fluid levels. .         Impression IMPRESSION:   1. No definite acute intracranial abnormality. However, there are focal low  densities in the left paraventricular deep white matter and right basal ganglia  region which may represent prior focal ischemia, age undetermined. There is no  intracranial hemorrhage or mass effect. MRI Results (recent):    Results from [place] encounter on 08/29/18   MRA BRAIN WO CONT   Narrative INDICATION: cva right sided facial droop, slurred speech since yesterday. Confusion and aphasia. EXAM: MRA HEAD, WITHOUT CONTRAST. COMPARISON:  None    TECHNIQUE:  2-D time-of-flight MRA of the brain was performed. Multiplanar  reconstructions were obtained. FINDINGS:    The vertebral arteries are codominant. The basilar artery and its branches are  patent, although there is mild attenuation of the distal posterior cerebral  arteries. . The internal carotid, anterior cerebral, and middle cerebral arteries  are patent, although there is mild attenuation of the left distal middle  cerebral artery branches. . There is no flow-limiting intracranial stenosis. There is no aneurysm. There are no sizable posterior communicating arteries. Impression IMPRESSION:  No flow limiting stenosis or intracranial aneurysm. Attenuated distal posterior  cerebral artery branches and left middle cerebral artery branches. IR Results (recent):  No results found for this or any previous visit. VAS/US Results (recent):    Results from Hospital Encounter encounter on 01/12/17   [address] [place] [address] (reviewed)  Results for orders placed or performed during the hospital encounter of 08/29/18   URINE CULTURE HOLD SAMPLE   Result Value Ref Range    Urine culture hold        URINE ON HOLD IN MICROBIOLOGY DEPT FOR 3 DAYS. IF UNPRESERVED URINE IS SUBMITTED, IT CANNOT BE USED FOR ADDITIONAL TESTING AFTER 24 HRS, RECOLLECTION WILL BE REQUIRED. SAMPLES BEING HELD   Result Value Ref Range    SAMPLES BEING HELD SST. TERESITA     COMMENT        Add-on orders for these samples will be processed based on acceptable specimen integrity and analyte stability, which may vary by analyte. CBC WITH AUTOMATED DIFF   Result Value Ref Range    WBC 7.7 4.1 - 11.1 K/uL    RBC 4.88 4.10 - 5.70 M/uL    HGB 9.1 (L) 12.1 - 17.0 g/dL    HCT 29.4 (L) 36.6 - 50.3 %    MCV 60.2 (L) 80.0 - 99.0 FL    MCH 18.6 (L) 26.0 - 34.0 PG    MCHC 31.0 30.0 - 36.5 g/dL    RDW 17.2 (H) 11.5 - 14.5 %    PLATELET 516 181 - 376 K/uL    MPV 9.5 8.9 - 12.9 FL    NRBC 0.0 0  WBC    ABSOLUTE NRBC 0.00 0.00 - 0.01 K/uL    NEUTROPHILS 64 32 - 75 %    LYMPHOCYTES 28 12 - 49 %    MONOCYTES 5 5 - 13 %    EOSINOPHILS 3 0 - 7 %    BASOPHILS 0 0 - 1 %    IMMATURE GRANULOCYTES 0 0.0 - 0.5 %    ABS. NEUTROPHILS 4.9 1.8 - 8.0 K/UL    ABS. LYMPHOCYTES 2.2 0.8 - 3.5 K/UL    ABS. MONOCYTES 0.4 0.0 - 1.0 K/UL    ABS. EOSINOPHILS 0.2 0.0 - 0.4 K/UL    ABS. BASOPHILS 0.0 0.0 - 0.1 K/UL    ABS. IMM. GRANS. 0.0 0.00 - 0.04 K/UL    DF SMEAR SCANNED      RBC COMMENTS MICROCYTOSIS  PRESENT        RBC COMMENTS OVALOCYTES  PRESENT       METABOLIC PANEL, BASIC   Result Value Ref Range    Sodium 140 136 - 145 mmol/L    Potassium 3.5 3.5 - 5.1 mmol/L    Chloride 106 97 - 108 mmol/L    CO2 26 21 - 32 mmol/L    Anion gap 8 5 - 15 mmol/L    Glucose 212 (H) 65 - 100 mg/dL    BUN 20 6 - 20 MG/DL    Creatinine 1.06 0.70 - 1.30 MG/DL    BUN/Creatinine ratio 19 12 - 20      GFR est AA >60 >60 ml/min/1.73m2    GFR est non-AA >60 >60 ml/min/1.73m2    Calcium 8.5 8.5 - 10.1 MG/DL   URINALYSIS W/MICROSCOPIC   Result Value Ref Range    Color YELLOW/STRAW      Appearance CLEAR CLEAR      Specific gravity 1.013 1.003 - 1.030      pH (UA) 7.0 5.0 - 8.0      Protein NEGATIVE  NEG mg/dL    Glucose 250 (A) NEG mg/dL    Ketone NEGATIVE  NEG mg/dL    Bilirubin NEGATIVE  NEG      Blood NEGATIVE  NEG      Urobilinogen 0.2 0.2 - 1.0 EU/dL    Nitrites NEGATIVE  NEG      Leukocyte Esterase NEGATIVE  NEG      WBC 0-4 0 - 4 /hpf    RBC 0-5 0 - 5 /hpf    Epithelial cells FEW FEW /lpf    Bacteria NEGATIVE  NEG /hpf    Hyaline cast 0-2 0 - 5 /lpf   T4, FREE   Result Value Ref Range    T4, Free 1.1 0.8 - 1.5 NG/DL   TSH 3RD GENERATION   Result Value Ref Range    TSH 1.79 0.36 - 3.74 uIU/mL   HEMOGLOBIN A1C WITH EAG   Result Value Ref Range    Hemoglobin A1c 7.0 (H) 4.2 - 6.3 %    Est. average glucose 154 mg/dL   LIPID PANEL   Result Value Ref Range    LIPID PROFILE          Cholesterol, total 87 <200 MG/DL    Triglyceride 82 <150 MG/DL    HDL Cholesterol 43 MG/DL    LDL, calculated 27.6 0 - 100 MG/DL    VLDL, calculated 16.4 MG/DL    CHOL/HDL Ratio 2.0 0 - 5.0     HEMOGLOBIN A1C WITH EAG   Result Value Ref Range    Hemoglobin A1c 6.6 (H) 4.2 - 6.3 %    Est. average glucose 143 mg/dL   FERRITIN   Result Value Ref Range    Ferritin 81 26 - 388 NG/ML   FOLATE   Result Value Ref Range    Folate 16.0 5.0 - 21.0 ng/mL   VITAMIN B12   Result Value Ref Range Vitamin B12 257 193 - 986 pg/mL   CBC W/O DIFF   Result Value Ref Range    WBC 6.5 4.1 - 11.1 K/uL    RBC 4.61 4.10 - 5.70 M/uL    HGB 8.6 (L) 12.1 - 17.0 g/dL    HCT 27.4 (L) 36.6 - 50.3 %    MCV 59.4 (L) 80.0 - 99.0 FL    MCH 18.7 (L) 26.0 - 34.0 PG    MCHC 31.4 30.0 - 36.5 g/dL    RDW 17.2 (H) 11.5 - 14.5 %    PLATELET 290 113 - 238 K/uL    NRBC 0.0 0  WBC    ABSOLUTE NRBC 0.00 0.00 - 3.38 K/uL   METABOLIC PANEL, COMPREHENSIVE   Result Value Ref Range    Sodium 141 136 - 145 mmol/L    Potassium 3.1 (L) 3.5 - 5.1 mmol/L    Chloride 106 97 - 108 mmol/L    CO2 26 21 - 32 mmol/L    Anion gap 9 5 - 15 mmol/L    Glucose 150 (H) 65 - 100 mg/dL    BUN 16 6 - 20 MG/DL    Creatinine 0.79 0.70 - 1.30 MG/DL    BUN/Creatinine ratio 20 12 - 20      GFR est AA >60 >60 ml/min/1.73m2    GFR est non-AA >60 >60 ml/min/1.73m2    Calcium 8.4 (L) 8.5 - 10.1 MG/DL    Bilirubin, total 1.0 0.2 - 1.0 MG/DL    ALT (SGPT) 19 12 - 78 U/L    AST (SGOT) 16 15 - 37 U/L    Alk.  phosphatase 50 45 - 117 U/L    Protein, total 6.1 (L) 6.4 - 8.2 g/dL    Albumin 3.6 3.5 - 5.0 g/dL    Globulin 2.5 2.0 - 4.0 g/dL    A-G Ratio 1.4 1.1 - 2.2     MAGNESIUM   Result Value Ref Range    Magnesium 1.6 1.6 - 2.4 mg/dL   GLUCOSE, POC   Result Value Ref Range    Glucose (POC) 161 (H) 65 - 100 mg/dL    Performed by Quinton Koyanagi (PCT)    GLUCOSE, POC   Result Value Ref Range    Glucose (POC) 164 (H) 65 - 100 mg/dL    Performed by Quinton Koyanagi (PCT)    EKG, 12 LEAD, INITIAL   Result Value Ref Range    Ventricular Rate 58 BPM    Atrial Rate 58 BPM    P-R Interval 218 ms    QRS Duration 108 ms    Q-T Interval 428 ms    QTC Calculation (Bezet) 420 ms    Calculated P Axis 48 degrees    Calculated R Axis 28 degrees    Calculated T Axis 37 degrees    Diagnosis       Sinus bradycardia with 1st degree AV block  Otherwise normal ECG  When compared with ECG of 20-JAN-2017 09:39,  ND interval has increased         Physical Exam:     Visit Vitals    /81 (BP 1 Location: Left arm, BP Patient Position: At rest)    Pulse (!) 51    Temp 98.4 °F (36.9 °C)    Resp 16    Ht 6' 1\" (1.854 m)    Wt 105.2 kg (232 lb)    SpO2 97%    BMI 30.61 kg/m2     General:  Alert, cooperative, no distress. Head:  Normocephalic, without obvious abnormality, atraumatic. Eyes:  Conjunctivae/corneas clear. Lungs:  Heart:   Non labored breathing  Regular rate and rhythm, no carotid bruits   Abdomen:   Soft, non-distended   Extremities: Extremities normal, atraumatic, no cyanosis or edema. Pulses: 2+ and symmetric all extremities. Skin: Skin color, texture, turgor normal. No rashes or lesions. Neurologic Exam     Gen: Attention normal             Language: naming, repetition, fluency normal             Memory: intact recent and remote memory  Cranial Nerves:  I: smell Not tested   II: visual fields Full to confrontation   II: pupils Equal, round, reactive to light   II: optic disc No papilledema   III,VII: ptosis none   III,IV,VI: extraocular muscles  Full ROM   V: mastication normal   V: facial light touch sensation  normal   VII: facial muscle function   symmetric   VIII: hearing symmetric   IX: soft palate elevation  normal   XI: trapezius strength  5/5   XI: sternocleidomastoid strength 5/5   XI: neck flexion strength  5/5   XII: tongue  midline     Motor: normal bulk and tone, no tremor              Strength: 5/5 all four extremities  Sensory: intact to LT, PP, vibration, and temperature  Reflexes: 1+ UE, trace knees, absent ankles throughout; Down going toes  Coordination: Good FTN and HTS  Gait: deferred           Impression:     Kristi Saha is a 68 y.o. male who  has a past medical history of CAD (coronary artery disease) (01/23/2017); DJD (degenerative joint disease) of knee (11/2013); ED (erectile dysfunction); Essential hypertension; Hyperlipidemia; T2DM (type 2 diabetes mellitus) ([facility] [address] and Thalassemia minor.  who was at [facility] and at the afternoon, was not feeling well, felt lightheaded and off balanced. Then, patient noted word-finding difficulty. MRI brain showed an acute stroke L centrum semiovale, more likely small vessel or watershed in nature. Patient is taking Cialis for BPH everyday which can cause drop in BP and risk for stroke (was lightheaded and probably was dehydrated). RECOMMENDATIONS  1. I had a long discussion with patient and family. Discussed diagnosis, prognosis, pathophysiology and available treatment. Reviewed test results. All questions were answered. 2. Awaiting Echo and Carotid doppler  3. LDL is 27.6  4. Continue ASA 81 and Plavix 75 for 3 months, then can stop Plavix and continue ASA 81  5. Advise to stop Cialis and talk to PCP for alternatives  6. Speech therapy  7. Keep SBP < 160  8.  Optimize medical management of Diabetes    Please call for questions        Thank you for the consultation      Kartik Feldman MD  Diplomate, American Board of Psychiatry and Neurology  Diplomate, Neuromuscular Medicine  Diplomate, American Board of Electrodiagnostic Medicine    Greater than 50% of time spent counseling patient        CC: Berkley Spears MD  Fax: [phone]

## 2018-08-30 NOTE — DISCHARGE SUMMARY
Physician Discharge Summary     Patient ID:  José Miguel Yan  [id number]  34 y.o.  1945    Admit date: 8/29/2018    Discharge date and time: 8/30/2018    Admission Diagnoses: CVA (cerebral vascular accident) [facility])    Discharge Diagnoses:    Principal Problem:    CVA (cerebral vascular accident) ([facility] (8/29/2018)    Active Problems:    HTN (hypertension), benign (6/8/2011)      Type 2 diabetes mellitus without complication ([facility] (1/79/2434)      CAD (coronary artery disease) (1/19/2017)      Dyslipidemia (3/17/2017)           Hospital Course:   CVA (cerebral vascular accident) ([facility] (8/29/2018):  Suspected by dysarthria, right facial droop, and right sided tongue deviation.  Head CT with age indeterminant lesions.  MRI confirms acute or subacute focal nonhemorrhagic ischemia in the left periventricular deep white matter. Appreciate neuro evaluation. Added Plavix to ASA, this is to be continued x 3 months then back to ASA alone per neurology. Per neuro rec, advised not to take cialis and discuss with PCP re: alternative options and risk vs benefit.      HTN (hypertension), benign (6/8/2011):  BP elevated overnight. Continued amlodipine. Will restart lisinopril / hctz at MI.      Type 2 diabetes mellitus without complication ([facility] (7/02/5861):  Has been under good control.  Last A1c was under 6 per patient. Metformin was continued.      CAD (coronary artery disease) (1/19/2017):  S/P CABG. To continue ASA along with Plavix for now.      Dyslipidemia (3/17/2017): Well controlled on Crestor.      Anemia:  Microcytic by labs.  Similar to 1/2017, but this was post op CABG. Likely due to thalassemia.     Hypokalemia:  Repleted with PO.    PCP: Mirela Nicole MD     Consults: Neurology    Condition of patient at discharge: stable    Discharge Exam:  Please refer to progress note from day of discharge.     Disposition: home    Discharge Medications:   Current Discharge Medication List      START taking these medications    Details   clopidogrel (PLAVIX) 75 mg tab Take 1 Tab by mouth daily. Qty: 30 Tab, Refills: 0      famotidine (PEPCID) 20 mg tablet Take 1 Tab by mouth two (2) times a day. Qty: 60 Tab, Refills: 0         CONTINUE these medications which have NOT CHANGED    Details   rosuvastatin (CRESTOR) 10 mg tablet Take 10 mg by mouth daily. metFORMIN ER (GLUCOPHAGE XR) 500 mg tablet Take 1,000 mg by mouth daily. lisinopril-hydroCHLOROthiazide (PRINZIDE, ZESTORETIC) 20-25 mg per tablet Take 1 Tab by mouth daily. escitalopram oxalate (LEXAPRO) 5 mg tablet Take 5 mg by mouth daily. acetaminophen (TYLENOL EXTRA STRENGTH) 500 mg tablet Take 1,000 mg by mouth every six (6) hours as needed for Pain. Associated Diagnoses: S/P CABG x 1      tadalafil (CIALIS) 5 mg tablet Take 5 mg by mouth daily. amLODIPine (NORVASC) 10 mg tablet Take 10 mg by mouth daily. aspirin delayed-release 81 mg tablet Take 81 mg by mouth daily. STOP taking these medications       metFORMIN (GLUCOPHAGE) 500 mg tablet Comments:   Reason for Stopping:               Activity: Activity as tolerated  Diet: Cardiac Diet and Diabetic Diet    Follow-up with Isom Mortimer, MD in 1 week. Approximate time spent in patient care on day of discharge: 40 min.     Signed:  Dion White MD  8/30/2018  1:45 PM

## 2018-08-30 NOTE — PROGRESS NOTES
[facility] follow-up appointment on Tuesday September 4,2018 @ 10:30 a.m.  with Denny Rosales. Specialist appointment on Tuesday October 2,2018 @ 10:40 a.m. With Dr. Rosalee Spence. [facility] will be consulted. Added to AVS. [address]ightower CM Specialist

## 2018-08-30 NOTE — PROGRESS NOTES
TRANSFER - IN REPORT: 
 
Verbal report received from Millie Jaramillo (name) on Torres Malave  being received from ED(unit) for routine progression of care Report consisted of patients Situation, Background, Assessment and  
Recommendations(SBAR). Information from the following report(s) SBAR, Kardex, ED Summary, Intake/Output, MAR, Accordion and Recent Results was reviewed with the receiving nurse. Opportunity for questions and clarification was provided. Assessment completed upon patients arrival to unit and care assumed. Primary Nurse Renetta Boyd and Barbara Cruz RN performed a dual skin assessment on this patient No impairment noted Brandon score is 22

## 2018-08-30 NOTE — PROGRESS NOTES
Pharmacist Discharge Medication Reconciliation Discharge Provider:  Dr. Jamal Reyes Comments/Recommendations:  
? Neurology recommended to discontinue cialis on discharge (not recommended in recent stroke past 6 months) and to review with PCP for alternative. ? Spoke with Dr. Jamal Reyes and he reviewed with patient and family to not take cialis on discharge and to follow-up with patient's PCP for an alternative treatment for BPH. Discharge Medications: My Medications START taking these medications Instructions Each Dose to Equal   Morning Noon Evening Bedtime  
 
 clopidogrel 75 mg Tab Commonly known as:  PLAVIX Start taking on:  8/31/2018 Your last dose was: Your next dose is: Take 1 Tab by mouth daily. 75 mg  
    
   
   
   
  
 famotidine 20 mg tablet Commonly known as:  PEPCID Your last dose was: Your next dose is: Take 1 Tab by mouth two (2) times a day. 20 mg CONTINUE taking these medications Instructions Each Dose to Equal   Morning Noon Evening Bedtime  
 
 amLODIPine 10 mg tablet Commonly known as:  Melissa Fallon Your last dose was: Your next dose is: Take 10 mg by mouth daily. 10 mg  
    
   
   
   
  
 aspirin delayed-release 81 mg tablet Your last dose was: Your next dose is: Take 81 mg by mouth daily. 81 mg  
    
   
   
   
  
 CIALIS 5 mg tablet Generic drug:  tadalafil Your last dose was: Your next dose is: Take 5 mg by mouth daily. 5 mg CRESTOR 10 mg tablet Generic drug:  rosuvastatin Your last dose was: Your next dose is: Take 10 mg by mouth daily. 10 mg  
    
   
   
   
  
 LEXAPRO 5 mg tablet Generic drug:  escitalopram oxalate Your last dose was: Your next dose is: Take 5 mg by mouth daily. 5 mg  
    
   
   
   
  
 lisinopril-hydroCHLOROthiazide 20-25 mg per tablet Commonly known as:  Ted Litchfield Your last dose was: Your next dose is: Take 1 Tab by mouth daily. 1 Tab  
    
   
   
   
  
 metFORMIN  mg tablet Commonly known as:  GLUCOPHAGE XR Your last dose was: Your next dose is: Take 1,000 mg by mouth daily. 1000 mg  
    
   
   
   
  
 TYLENOL EXTRA STRENGTH 500 mg tablet Generic drug:  acetaminophen Your last dose was: Your next dose is: Take 1,000 mg by mouth every six (6) hours as needed for Pain. 1000 mg Where to Get Your Medications These medications were sent to [facility] [address] [place] [address] Phone:  [phone]  clopidogrel 75 mg Tab  famotidine 20 mg tablet The patient's chart, MAR, and AVS were reviewed by 
 Shania Chapman, PHARMD, Contact: [phone]

## 2018-08-30 NOTE — PHYSICIAN ADVISORY
Short Stay Review Pt Name:  Alireza Casanova MR#  [id number] [facility]#   [id number] Room and Hospital  510/01  @ [facility] Hospitalization date  8/29/2018  4:40 PM 
No discharge date for patient encounter. Current Attending Physician  Nabila Renteria MD  
 
A discharge order has been placed for this episode of hospital care for Mr. Alireza Casanova; since this hospital stay is less than two midnights, I reviewed Mr. Alice Strong chart. Mr. Alice Strong healthcare insurance/benefit include: 
Payor: [place] MED[place]ARE / Plan: [place] MEDICARE PART A & B / Product Type: Medicare /  
 
Utilization Review related case summary:  
Age  68 y.o.  
BMI  Body mass index is 30.61 kg/(m^2). PMHx includes  CAD, HTN, DM2, Hyperlipidemia Hospital course  The pt was hospitalized and treated for CVA. MRI confirms the CNS changes Risk of deterioration at the time this patient  was hospitalized  High On the basis of chart review, this patient's hospitalization status     
is appropriate for INPATIENT Ernesto Avendano MD MPH FACP Physician Advisor Michael  [address]  
Utilization Review, Care Management CSN:  [id number] NIGHAT:   [id number]* Admitted on :  8/29/2018 Discharge order

## 2018-08-30 NOTE — PROGRESS NOTES
Bedside and Verbal shift change report given to Star Recio (oncoming nurse) by Montserrat Anand (offgoing nurse). Report included the following information SBAR, Kardex, Intake/Output, MAR, Accordion and Recent Results.

## 2018-08-31 ENCOUNTER — PATIENT OUTREACH (OUTPATIENT)
Dept: FAMILY MEDICINE CLINIC | Age: 73
End: 2018-08-31

## 2018-08-31 NOTE — PROGRESS NOTES
Hospital Discharge Follow-Up Date/Time:  2018 2:09 PM 
 
Patient was admitted to [facility] on 18 and discharged on 18 for CVA. The physician discharge summary was available at the time of outreach. Patient was contacted within 1 business days of discharge. Top Challenges reviewed with the provider  
none Method of communication with provider :none Inpatient RRAT score: 18 Was this a readmission? no  
Patient stated reason for the readmission: N/A Nurse Navigator (NN) contacted the patient by telephone to perform post hospital discharge assessment. Verified name and  with patient as identifiers. Provided introduction to self, and explanation of the Nurse Navigator role. Reviewed discharge instructions and red flags with patient who verbalized understanding. Patient given an opportunity to ask questions and does not have any further questions or concerns at this time. The patient agrees to contact the PCP office for questions related to their healthcare. NN provided contact information for future reference. Disease Specific:   N/A Summary of patient's top problems: 1. Slight speech deficit 2. HTN Home Health orders at discharge: none Home Health company: N/A Date of initial visit: N/A Durable Medical Equipment ordered/company: None Durable Medical Equipment received: N/A Barriers to care? none Advance Care Planning:  
Does patient have an Advance Directive:  reviewed and current Medication(s):  
New Medications at Discharge: Plavix, Famotidine Changed Medications at Discharge:  
Discontinued Medications at Discharge: metformin Pt states he was told to stop taking Cialis confirmed by neurology note but not on Discharge forms as a d/c'd med. Pt states he has stopped taking. Pt states he has picked up his new medication,  Pt states he was outside so was unable to preform a complete  medication reconciliation. Medication reconciliation was  not completed. who verbalizes understanding of administration of home medications. There were no barriers to obtaining medications identified at this time. Referral to Pharm D needed: no  
 
Current Outpatient Prescriptions Medication Sig  clopidogrel (PLAVIX) 75 mg tab Take 1 Tab by mouth daily.  famotidine (PEPCID) 20 mg tablet Take 1 Tab by mouth two (2) times a day.  rosuvastatin (CRESTOR) 10 mg tablet Take 10 mg by mouth daily.  metFORMIN ER (GLUCOPHAGE XR) 500 mg tablet Take 1,000 mg by mouth daily.  lisinopril-hydroCHLOROthiazide (PRINZIDE, ZESTORETIC) 20-25 mg per tablet Take 1 Tab by mouth daily.  escitalopram oxalate (LEXAPRO) 5 mg tablet Take 5 mg by mouth daily.  acetaminophen (TYLENOL EXTRA STRENGTH) 500 mg tablet Take 1,000 mg by mouth every six (6) hours as needed for Pain.  tadalafil (CIALIS) 5 mg tablet Take 5 mg by mouth daily.  amLODIPine (NORVASC) 10 mg tablet Take 10 mg by mouth daily.  aspirin delayed-release 81 mg tablet Take 81 mg by mouth daily. No current facility-administered medications for this visit. There are no discontinued medications. [facility] follow up appointment(s): Future Appointments Date Time Provider [facility] 10/2/2018 10:40 AM Vicente Donovan MD [place] [address]  
1/7/2019 8:00 AM Jes Vega MD [address] [facility] follow up appointment(s): Dr. Charanjit Nolasco Sept 4th @1030 am 
[facility]:  scheduled for Sept 1st [facility] called to confirm the appointment however pt declined. Pt told me \"someone called this morning to come out but I don't need them,  I am doing fine\" Goals None

## 2018-09-10 ENCOUNTER — PATIENT OUTREACH (OUTPATIENT)
Dept: FAMILY MEDICINE CLINIC | Age: 73
End: 2018-09-10

## 2018-09-11 ENCOUNTER — PATIENT OUTREACH (OUTPATIENT)
Dept: FAMILY MEDICINE CLINIC | Age: 73
End: 2018-09-11

## 2018-09-11 NOTE — PROGRESS NOTES
This NN spoke with pt today,  He had his follow up appointment with PCP last week,  Speech continues to improve with only slight residual which does not require out patient speech therapy. He has no other concerns at this time, pt has my direct contact information.

## 2018-09-21 ENCOUNTER — PATIENT OUTREACH (OUTPATIENT)
Dept: FAMILY MEDICINE CLINIC | Age: 73
End: 2018-09-21

## 2018-09-27 ENCOUNTER — PATIENT OUTREACH (OUTPATIENT)
Dept: FAMILY MEDICINE CLINIC | Age: 73
End: 2018-09-27

## 2018-09-27 NOTE — PROGRESS NOTES
This NN contacted pt for 30 day BIPIN follow up call. Pt states he continues to do well. He states the one think he has noticed since his CVA was increased saliva in mouth, he is cognizant of that and has had no issues such as aspiration due to this. He did enquire about restarting Cialis,  NN referred to Dr. Yari Chung note reiterating the risk for decreased BP as well as increase for stroke. Pt does have a follow up appointment with Dr. Olegario Bailey on 10/2/18,  He will further discuss the possibility of restarting Cialis at that time. Patient has graduated from the Transitions of Care Coordination  program on 9/27/18. Patient's symptoms are stable at this time. Patient/family has the ability to self-manage. Care management goals have been completed at this time. No further nurse navigator follow up scheduled. Goals Addressed None Pt has nurse navigator's contact information for any further questions, concerns, or needs. Patients upcoming visits:  Future Appointments Date Time Provider Gina Cole 10/2/2018 10:40 AM MD Tiarra Waterman[address]  
1/7/2019 8:00 AM Magda Fatima MD [address] [place] [address] Liz Moran

## 2018-10-02 ENCOUNTER — OFFICE VISIT (OUTPATIENT)
Dept: NEUROLOGY | Age: 73
End: 2018-10-02

## 2018-10-02 VITALS
DIASTOLIC BLOOD PRESSURE: 70 MMHG | OXYGEN SATURATION: 98 % | SYSTOLIC BLOOD PRESSURE: 122 MMHG | BODY MASS INDEX: 31.14 KG/M2 | HEART RATE: 76 BPM | WEIGHT: 236 LBS

## 2018-10-02 DIAGNOSIS — I63.9 CEREBROVASCULAR ACCIDENT (CVA), UNSPECIFIED MECHANISM (HCC): Primary | ICD-10-CM

## 2018-10-02 RX ORDER — METOPROLOL SUCCINATE 25 MG/1
TABLET, EXTENDED RELEASE ORAL DAILY
COMMUNITY
End: 2019-01-07 | Stop reason: ALTCHOICE

## 2018-10-02 NOTE — PROGRESS NOTES
Neurology Progress Note    Patient ID:  Stacy Ott  [id number]  87 y.o.  1945      Subjective:   History:  Stacy Ott is a 68 y.o. male who  has a past medical history of CAD (coronary artery disease) (01/23/2017); DJD (degenerative joint disease) of knee (11/2013); ED (erectile dysfunction); Essential hypertension; Hyperlipidemia; T2DM (type 2 diabetes mellitus) ([facility] [address] and Thalassemia minor. who on 8/29/18 was at [facility] and at the afternoon, was not feeling well, felt lightheaded and off balanced. Then, patient noted word-finding difficulty. Patient was admitted at [facility] where MRI brain showed an acute stroke L centrum semiovale, more likely small vessel or watershed in nature. Patient is taking Cialis for BPH everyday which can cause drop in BP and risk for stroke (was lightheaded and probably was dehydrated). Since las time, patient has completely recovered speech with no motor or sensory deficit. Patient is still taking ASA 81 and Plavix 75 with no new event or any bleeding. Has stopped taking Cialis.     ROS:  Per HPI-  Otherwise the remainder of ROS was negative    Social Hx  Social History     Social History    Marital status:      Spouse name: N/A    Number of children: N/A    Years of education: N/A     Social History Main Topics    Smoking status: Former Smoker     Packs/day: 2.00     Years: 15.00     Quit date: 11/4/1981    Smokeless tobacco: Never Used      Comment: quit > 30 years ago    Alcohol use Yes      Comment: rare social     Drug use: No    Sexual activity: Not Asked     Other Topics Concern    None     Social History Narrative       Meds:  Current Outpatient Prescriptions on File Prior to Visit   Medication Sig Dispense Refill    clopidogrel (PLAVIX) 75 mg tab Take 1 Tab by mouth daily. 30 Tab 0    famotidine (PEPCID) 20 mg tablet Take 1 Tab by mouth two (2) times a day. 60 Tab 0    rosuvastatin (CRESTOR) 10 mg tablet Take 10 mg by mouth daily.       metFORMIN ER (GLUCOPHAGE XR) 500 mg tablet Take 1,000 mg by mouth daily.  lisinopril-hydroCHLOROthiazide (PRINZIDE, ZESTORETIC) 20-25 mg per tablet Take 1 Tab by mouth daily.  escitalopram oxalate (LEXAPRO) 5 mg tablet Take 5 mg by mouth daily.  tadalafil (CIALIS) 5 mg tablet Take 5 mg by mouth daily.  amLODIPine (NORVASC) 10 mg tablet Take 10 mg by mouth daily.  acetaminophen (TYLENOL EXTRA STRENGTH) 500 mg tablet Take 1,000 mg by mouth every six (6) hours as needed for Pain.  aspirin delayed-release 81 mg tablet Take 81 mg by mouth daily. No current facility-administered medications on file prior to visit. Imaging:    CT Results (recent):    Results from Hospital Encounter encounter on 08/29/18   CT HEAD WO CONT   Narrative EXAM:  CT HEAD WO CONT    INDICATION:   R side facial droop.  slurred speech.  22.5 hours. Onset of  confusion, aphasia and right facial droop began yesterday. Slurred speech began  at 6:00 PM yesterday. COMPARISON: None. CONTRAST:  None. TECHNIQUE: Unenhanced CT of the head was performed using 5 mm images. Brain and  bone windows were generated. CT dose reduction was achieved through use of a  standardized protocol tailored for this examination and automatic exposure  control for dose modulation. FINDINGS:  Generalized prominence of cerebral sulci and ventricles is mildly increased but  commensurate with age. . Periventricular white matter low-density is minimal, but  there are focal low densities in the left paraventricular deep white matter  (centrum semiovale) and in the right basal ganglia region. There is no mass  effect. . There is no intracranial hemorrhage, extra-axial collection, mass, mass  effect or midline shift. The basilar cisterns are open. No acute infarct is  identified. The bone windows demonstrate no abnormalities. Retention cyst is  suggested in the right maxillary sinus. There are no air-fluid levels. Carole Arana Impression IMPRESSION:   1. No definite acute intracranial abnormality. However, there are focal low  densities in the left paraventricular deep white matter and right basal ganglia  region which may represent prior focal ischemia, age undetermined. There is no  intracranial hemorrhage or mass effect. MRI Results (recent):    Results from [place] encounter on 08/29/18   MRA BRAIN WO CONT   Narrative INDICATION: cva right sided facial droop, slurred speech since yesterday. Confusion and aphasia. EXAM: MRA HEAD, WITHOUT CONTRAST. COMPARISON:  None    TECHNIQUE:  2-D time-of-flight MRA of the brain was performed. Multiplanar  reconstructions were obtained. FINDINGS:    The vertebral arteries are codominant. The basilar artery and its branches are  patent, although there is mild attenuation of the distal posterior cerebral  arteries. . The internal carotid, anterior cerebral, and middle cerebral arteries  are patent, although there is mild attenuation of the left distal middle  cerebral artery branches. . There is no flow-limiting intracranial stenosis. There is no aneurysm. There are no sizable posterior communicating arteries. Impression IMPRESSION:  No flow limiting stenosis or intracranial aneurysm. Attenuated distal posterior  cerebral artery branches and left middle cerebral artery branches. IR Results (recent):  No results found for this or any previous visit.     VAS/US Results (recent):    Results from Hospital Encounter encounter on 08/29/18   [address] [place] [address] records in [facility] and media tab today    Lab Review     Admission on 08/29/2018, Discharged on 08/30/2018   Component Date Value Ref Range Status    Ventricular Rate 08/29/2018 58  BPM Final    Atrial Rate 08/29/2018 58  BPM Final    P-R Interval 08/29/2018 218  ms Final    QRS Duration 08/29/2018 108  ms Final    Q-T Interval 08/29/2018 428  ms Final    QTC Calculation (Vibha) 08/29/2018 420  ms Final    Calculated P Axis 08/29/2018 48  degrees Final    Calculated R Axis 08/29/2018 28  degrees Final    Calculated T Axis 08/29/2018 37  degrees Final    Diagnosis 08/29/2018    Final                    Value:Sinus bradycardia with 1st degree AV block  Otherwise normal ECG  When compared with ECG of 20-JAN-2017 09:39,  [place] interval has increased  Confirmed by Gema Dunn MD., Yuniel ([id number]) on 8/30/2018 11:16:52 AM      SAMPLES BEING HELD 08/29/2018 SST. TERESITA   Final    COMMENT 08/29/2018 Add-on orders for these samples will be processed based on acceptable specimen integrity and analyte stability, which may vary by analyte. Final    WBC 08/29/2018 7.7  4.1 - 11.1 K/uL Final    Comment: Due to mathematical rounding between the [address], and the new [employer] analyzers, the reported automated differential may vary by up to +/- 0.5% per cell line. This finding may produce a result that is 100% +/- 3%, which is clinically insignificant.  RBC 08/29/2018 4.88  4.10 - 5.70 M/uL Final    HGB 08/29/2018 9.1* 12.1 - 17.0 g/dL Final    HCT 08/29/2018 29.4* 36.6 - 50.3 % Final    MCV 08/29/2018 60.2* 80.0 - 99.0 FL Final    MCH 08/29/2018 18.6* 26.0 - 34.0 PG Final    MCHC 08/29/2018 31.0  30.0 - 36.5 g/dL Final    RDW 08/29/2018 17.2* 11.5 - 14.5 % Final    PLATELET 15/46/1493 217  150 - 400 K/uL Final    MPV 08/29/2018 9.5  8.9 - 12.9 FL Final    NRBC 08/29/2018 0.0  0  WBC Final    ABSOLUTE NRBC 08/29/2018 0.00  0.00 - 0.01 K/uL Final    NEUTROPHILS 08/29/2018 64  32 - 75 % Final    LYMPHOCYTES 08/29/2018 28  12 - 49 % Final    MONOCYTES 08/29/2018 5  5 - 13 % Final    EOSINOPHILS 08/29/2018 3  0 - 7 % Final    BASOPHILS 08/29/2018 0  0 - 1 % Final    IMMATURE GRANULOCYTES 08/29/2018 0  0.0 - 0.5 % Final    ABS. NEUTROPHILS 08/29/2018 4.9  1.8 - 8.0 K/UL Final    ABS. LYMPHOCYTES 08/29/2018 2.2  0.8 - 3.5 K/UL Final    ABS. MONOCYTES 08/29/2018 0.4  0.0 - 1.0 K/UL Final    ABS. EOSINOPHILS 08/29/2018 0.2  0.0 - 0.4 K/UL Final    ABS. BASOPHILS 08/29/2018 0.0  0.0 - 0.1 K/UL Final    ABS. IMM. GRANS. 08/29/2018 0.0  0.00 - 0.04 K/UL Final    DF 08/29/2018 SMEAR SCANNED    Final    RBC COMMENTS 08/29/2018     Final                    Value:MICROCYTOSIS  PRESENT      RBC COMMENTS 08/29/2018     Final                    Value:OVALOCYTES  PRESENT      Sodium 08/29/2018 140  136 - 145 mmol/L Final    Potassium 08/29/2018 3.5  3.5 - 5.1 mmol/L Final    Chloride 08/29/2018 106  97 - 108 mmol/L Final    CO2 08/29/2018 26  21 - 32 mmol/L Final    Anion gap 08/29/2018 8  5 - 15 mmol/L Final    Glucose 08/29/2018 212* 65 - 100 mg/dL Final    BUN 08/29/2018 20  6 - 20 MG/DL Final    Creatinine 08/29/2018 1.06  0.70 - 1.30 MG/DL Final    BUN/Creatinine ratio 08/29/2018 19  12 - 20   Final    GFR est AA 08/29/2018 >60  >60 ml/min/1.73m2 Final    GFR est non-AA 08/29/2018 >60  >60 ml/min/1.73m2 Final    Comment: Estimated GFR is calculated using the IDMS-traceable Modification of Diet in Renal Disease (MDRD) Study equation, reported for both  Americans (GFRAA) and non- Americans (GFRNA), and normalized to 1.73m2 body surface area. The physician must decide which value applies to the patient. The MDRD study equation should only be used in individuals age 25 or older. It has not been validated for the following: pregnant women, patients with serious comorbid conditions, or on certain medications, or persons with extremes of body size, muscle mass, or nutritional status.       Calcium 08/29/2018 8.5  8.5 - 10.1 MG/DL Final    Color 08/29/2018 YELLOW/STRAW    Final    Color Reference Range: Straw, Yellow or Dark Yellow    Appearance 08/29/2018 CLEAR  CLEAR   Final    Specific gravity 08/29/2018 1.013  1.003 - 1.030   Final    pH (UA) 08/29/2018 7.0  5.0 - 8.0   Final    Protein 08/29/2018 NEGATIVE   NEG mg/dL Final    Glucose 08/29/2018 250* NEG mg/dL Final    Ketone 08/29/2018 NEGATIVE   NEG mg/dL Final    Bilirubin 08/29/2018 NEGATIVE   NEG   Final    Blood 08/29/2018 NEGATIVE   NEG   Final    Urobilinogen 08/29/2018 0.2  0.2 - 1.0 EU/dL Final    Nitrites 08/29/2018 NEGATIVE   NEG   Final    Leukocyte Esterase 08/29/2018 NEGATIVE   NEG   Final    WBC 08/29/2018 0-4  0 - 4 /hpf Final    RBC 08/29/2018 0-5  0 - 5 /hpf Final    Epithelial cells 08/29/2018 FEW  FEW /lpf Final    Epithelial cell category consists of squamous cells and /or transitional urothelial cells. Renal tubular cells, if present, are separately identified as such.  Bacteria 08/29/2018 NEGATIVE   NEG /hpf Final    Hyaline cast 08/29/2018 0-2  0 - 5 /lpf Final    Urine culture hold 08/29/2018 URINE ON HOLD IN MICROBIOLOGY DEPT FOR 3 DAYS. IF UNPRESERVED URINE IS SUBMITTED, IT CANNOT BE USED FOR ADDITIONAL TESTING AFTER 24 HRS, RECOLLECTION WILL BE REQUIRED. Final    T4, Free 08/29/2018 1.1  0.8 - 1.5 NG/DL Final    TSH 08/29/2018 1.79  0.36 - 3.74 uIU/mL Final    Comment: (NOTE)  Due to TSH heterogeneity, both structurally and degree of   glycosylation, monoclonal antibodies used in the TSH assay may not   accurately quantitate TSH. Therefore, this result should be   correlated with clinical findings as well as with other assessments   of thyroid function, e.g., free T4, free T3.      Hemoglobin A1c 08/29/2018 7.0* 4.2 - 6.3 % Final    Est. average glucose 08/29/2018 154  mg/dL Final    Comment: (NOTE)  The eAG should be interpreted with patient characteristics in mind   since ethnicity, interindividual differences, red cell lifespan,   variation in rates of glycation, etc. may affect the validity of the   calculation.  Glucose (POC) 08/29/2018 161* 65 - 100 mg/dL Final    Comment: (NOTE)  The Accu-Chek Inform II glucometer is not FDA cleared for critically   ill patient use.  A study was performed validating the equivalence of glucometer and clinical laboratory results on this patient   population. Despite the study, use of glucometers with capillary   specimens from critically ill patients, regardless of their location,   makes the test high complexity and requires the performing individual   to comply with CLIA requirements more stringent than those for waived   testing in the hospital setting. Critical thinking skills are   necessary to determine a potentially critically ill patients status   prior to using a glucometer.  Performed by 08/29/2018 Jerrell Bautista (PCT)   Final    LIPID PROFILE 08/30/2018        Final    Cholesterol, total 08/30/2018 87  <200 MG/DL Final    Triglyceride 08/30/2018 82  <150 MG/DL Final    Based on NCEP-ATP III:  Triglycerides <150 mg/dL  is considered normal, 150-199 mg/dL  borderline high,  200-499 mg/dL high and  greater than or equal to 500 mg/dL very high.  HDL Cholesterol 08/30/2018 43  MG/DL Final    Based on NCEP ATP III, HDL Cholesterol <40 mg/dL is considered low and >60 mg/dL is elevated.  LDL, calculated 08/30/2018 27.6  0 - 100 MG/DL Final    Comment: Based on the NCEP-ATP: LDL-C concentrations are considered  optimal <100 mg/dL,  near optimal/above Normal 100-129 mg/dL  Borderline High: 130-159, High: 160-189 mg/dL  Very High: Greater than or equal to 190 mg/dL      VLDL, calculated 08/30/2018 16.4  MG/DL Final    CHOL/HDL Ratio 08/30/2018 2.0  0 - 5.0   Final    Hemoglobin A1c 08/30/2018 6.6* 4.2 - 6.3 % Final    Est. average glucose 08/30/2018 143  mg/dL Final    Comment: (NOTE)  The eAG should be interpreted with patient characteristics in mind   since ethnicity, interindividual differences, red cell lifespan,   variation in rates of glycation, etc. may affect the validity of the   calculation.       Ferritin 08/30/2018 81  26 - 388 NG/ML Final    Folate 08/30/2018 16.0  5.0 - 21.0 ng/mL Final    Iron 08/30/2018 83  35 - 150 ug/dL Final    TIBC 08/30/2018 240* 250 - 450 ug/dL Final    Iron % saturation 08/30/2018 35  20 - 50 % Final    Vitamin B12 08/30/2018 257  193 - 986 pg/mL Final    Occult blood, stool 08/30/2018 NEGATIVE   NEG   Final    WBC 08/30/2018 6.5  4.1 - 11.1 K/uL Final    Comment: Due to mathematical rounding between the [address], and the new [employer] Hemat[employer] analyzers, the reported automated differential may vary by up to +/- 0.5% per cell line. This finding may produce a result that is 100% +/- 3%, which is clinically insignificant.  RBC 08/30/2018 4.61  4.10 - 5.70 M/uL Final    HGB 08/30/2018 8.6* 12.1 - 17.0 g/dL Final    HCT 08/30/2018 27.4* 36.6 - 50.3 % Final    MCV 08/30/2018 59.4* 80.0 - 99.0 FL Final    MCH 08/30/2018 18.7* 26.0 - 34.0 PG Final    MCHC 08/30/2018 31.4  30.0 - 36.5 g/dL Final    RDW 08/30/2018 17.2* 11.5 - 14.5 % Final    PLATELET 16/47/5541 231  150 - 400 K/uL Final    NRBC 08/30/2018 0.0  0  WBC Final    ABSOLUTE NRBC 08/30/2018 0.00  0.00 - 0.01 K/uL Final    Sodium 08/30/2018 141  136 - 145 mmol/L Final    Potassium 08/30/2018 3.1* 3.5 - 5.1 mmol/L Final    Chloride 08/30/2018 106  97 - 108 mmol/L Final    CO2 08/30/2018 26  21 - 32 mmol/L Final    Anion gap 08/30/2018 9  5 - 15 mmol/L Final    Glucose 08/30/2018 150* 65 - 100 mg/dL Final    BUN 08/30/2018 16  6 - 20 MG/DL Final    Creatinine 08/30/2018 0.79  0.70 - 1.30 MG/DL Final    BUN/Creatinine ratio 08/30/2018 20  12 - 20   Final    GFR est AA 08/30/2018 >60  >60 ml/min/1.73m2 Final    GFR est non-AA 08/30/2018 >60  >60 ml/min/1.73m2 Final    Calcium 08/30/2018 8.4* 8.5 - 10.1 MG/DL Final    Bilirubin, total 08/30/2018 1.0  0.2 - 1.0 MG/DL Final    ALT (SGPT) 08/30/2018 19  12 - 78 U/L Final    AST (SGOT) 08/30/2018 16  15 - 37 U/L Final    Alk.  phosphatase 08/30/2018 50  45 - 117 U/L Final    Protein, total 08/30/2018 6.1* 6.4 - 8.2 g/dL Final    Albumin 08/30/2018 3.6  3.5 - 5.0 g/dL Final    Globulin 08/30/2018 2.5  2.0 - 4.0 g/dL Final    A-G Ratio 08/30/2018 1.4  1.1 - 2.2   Final    Magnesium 08/30/2018 1.6  1.6 - 2.4 mg/dL Final    Glucose (POC) 08/30/2018 164* 65 - 100 mg/dL Final    Comment: (NOTE)  The Accu-Chek Inform II glucometer is not FDA cleared for critically   ill patient use. A study was performed validating the equivalence of   glucometer and clinical laboratory results on this patient   population. Despite the study, use of glucometers with capillary   specimens from critically ill patients, regardless of their location,   makes the test high complexity and requires the performing individual   to comply with CLIA requirements more stringent than those for waived   testing in the hospital setting. Critical thinking skills are   necessary to determine a potentially critically ill patients status   prior to using a glucometer.  Performed by 08/30/2018 Rufina Krishnan (PB)   Final         Objective:       Exam:  Visit Vitals    /70    Pulse 76    Wt 107 kg (236 lb)    SpO2 98%    BMI 31.14 kg/m2     Gen: Awake, alert, follows commands  Appropriate appearance, normal speech. Oriented to all spheres. No visual field defect on confrontation exam.  Full eyes movement, with no nystagmus, no diplopia, no ptosis. Normal gag and swallow. All remaining cranial nerves were normal  Motor function: 5/5 in all extremities  Sensory: intact to LT, PP and JPS  Good FTN and HTS   Gait: Normal    Assessment:       ICD-10-CM ICD-9-CM    1. Cerebrovascular accident (CVA), unspecified mechanism ([facility] [address] I63.9 434.91            Plan:   1. Continue ASA 81 and Plavix 75 for 3 months, then can stop Plavix and continue ASA 81  2. Advise not to drive for 6 months as per [facility] policy. Filled out DMV form  3. Optimize medical management of Diabetes, HTN and cholesterol      Follow-up Disposition:  Return if symptoms worsen or fail to improve.           Janet Roy MD  Diplomate, American Board of Psychiatry and Neurology  Diplomate, Neuromuscular Medicine  Diplomate, American Board of Electrodiagnostic Medicine

## 2018-10-02 NOTE — PATIENT INSTRUCTIONS
10 [facility]   Statement to Patients  April 1, 2014      In an effort to ensure the large volume of patient prescription refills is processed in the most efficient and expeditious manner, we are asking our patients to assist us by calling your Pharmacy for all prescription refills, this will include also your  Mail Order Pharmacy. The pharmacy will contact our office electronically to continue the refill process. Please do not wait until the last minute to call your pharmacy. We need at least 48 hours (2days) to fill prescriptions. We also encourage you to call your pharmacy before going to  your prescription to make sure it is ready. With regard to controlled substance prescription refill requests (narcotic refills) that need to be picked up at our office, we ask your cooperation by providing us with at least 72 hours (3days) notice that you will need a refill. We will not refill narcotic prescription refill requests after 4:00pm on any weekday, Monday through Thursday, or after 2:00pm on Fridays, or on the weekends. We encourage everyone to explore another way of getting your prescription refill request processed using [employer], our patient web portal through our electronic medical record system. [employer] is an efficient and effective way to communicate your medication request directly to the office and  downloadable as an alex on your smart phone . [employer] also features a review functionality that allows you to view your medication list as well as leave messages for your physician. Are you ready to get connected? If so please review the attatched instructions or speak to any of our staff to get you set up right away! Thank you so much for your cooperation. Should you have any questions please contact our Practice Administrator.     The Physicians and Staff,  [address] [place] [address] Neurology Clinic        Stroke: Care Instructions  Your Care Instructions    You have had a stroke. This means that the blood flow to a part of your brain was blocked for some time, which damages the nerve cells in that part of the brain. The part of your body controlled by that part of your brain may not function properly now. The brain is an amazing organ that can heal itself to some degree. The stroke you had damaged part of your brain. But other parts of your brain may take over in some way for the damaged areas. You have already started this process. Your doctor will talk with you about what you can do to prevent another stroke. High blood pressure, high cholesterol, and diabetes are all risk factors for stroke. If you have any of these conditions, work with your doctor to make sure they are under control. Other risk factors for stroke include being overweight, smoking, and not getting regular exercise. Going home may be hard for you and your family. The more you can try to do for yourself, the better. Remember to take each day one at a time. Follow-up care is a key part of your treatment and safety. Be sure to make and go to all appointments, and call your doctor if you are having problems. It's also a good idea to know your test results and keep a list of the medicines you take. How can you care for yourself at home?    · Enter a stroke rehabilitation (rehab) program, if your doctor recommends it. Physical, speech, and occupational therapies can help you manage bathing, dressing, eating, and other basics of daily living.     · Do not drive until your doctor says it is okay.     · It is normal to feel sad or depressed after a stroke. If these feelings last, talk to your doctor.     · If you are having problems with urine leakage, go to the bathroom at regular times, including when you first wake up and at bedtime. Also, limit fluids after dinner.     · If you are constipated, drink plenty of fluids, enough so that your urine is light yellow or clear like water.  If you have kidney, heart, or liver disease and have to limit fluids, talk with your doctor before you increase the amount of fluids you drink. Set up a regular time for using the toilet. If you continue to have constipation, your doctor may suggest using a bulking agent, such as Metamucil, or a stool softener, laxative, or enema. Medicines    · Take your medicines exactly as prescribed. Call your doctor if you think you are having a problem with your medicine. You may be taking several medicines. ACE (angiotensin-converting enzyme) inhibitors, angiotensin II receptor blockers (ARBs), beta-blockers, diuretics (water pills), and calcium channel blockers control your blood pressure. Statins help lower cholesterol. Your doctor may also prescribe medicines for depression, pain, sleep problems, anxiety, or agitation.     · If your doctor has given you a blood thinner to prevent another stroke, be sure you get instructions about how to take your medicine safely. Blood thinners can cause serious bleeding problems.     · Do not take any over-the-counter medicines or herbal products without talking to your doctor first.     · If you take birth control pills or hormone therapy, talk to your doctor about whether they are right for you.    For family members and caregivers    · Make the home safe. Set up a room so that your loved one does not have to climb stairs. Be sure the bathroom is on the same floor. Move throw rugs and furniture that could cause falls. Make sure that the lighting is good. Put grab bars and seats in tubs and showers.     · Find out what your loved one can do and what he or she needs help with. Try not to do things for your loved one that your loved one can do on his or her own. Help him or her learn and practice new skills.     · Visit and talk with your loved one often. Try doing activities together that you both enjoy, such as playing cards or board games.  Keep in touch with your loved one's friends as much as you can. Encourage them to visit.     · Take care of yourself. Do not try to do everything yourself. Ask other family members to help. Eat well, get enough rest, and take time to do things that you enjoy. Keep up with your own doctor visits, and make sure to take your medicines regularly. Get out of the house as much as you can. Join a local support group. Find out if you qualify for home health care visits to help with rehab or for adult day care. When should you call for help? Call 911 anytime you think you may need emergency care. For example, call if:    · You have signs of another stroke. These may include:  ¨ Sudden numbness, tingling, weakness, or loss of movement in your face, arm, or leg, especially on only one side of your body. ¨ Sudden vision changes. ¨ Sudden trouble speaking. ¨ Sudden confusion or trouble understanding simple statements. ¨ Sudden problems with walking or balance. ¨ A sudden, severe headache that is different from past headaches. Call 911 even if these symptoms go away in a few minutes.    Call your doctor now or seek immediate medical care if:    · You have new symptoms that may be related to your stroke, such as falls or trouble swallowing.    Watch closely for changes in your health, and be sure to contact your doctor if you have any problems. Where can you learn more? Go to http://vic-sony.info/. Enter K633 in the search box to learn more about \"Stroke: Care Instructions. \"  Current as of: November 21, 2017  Content Version: 11.7  © 4202-8306 [employer]. Care instructions adapted under license by [employer] (which disclaims liability or warranty for this information). If you have questions about a medical condition or this instruction, always ask your healthcare professional. [place] [address] any warranty or liability for your use of this information.

## 2018-10-02 NOTE — LETTER
10/2/2018 11:26 AM 
 
Patient:  Gilberto Molina YOB: 1945 Date of Visit: 10/2/2018 Dear Philip Mcintyre MD 
[address] On license of [facility] 99 [address] VIA Facsimile: [phone] 
 : Thank you for referring Mr. Gilberto Molina to me for evaluation/treatment. Below are the relevant portions of my assessment and plan of care. If you have questions, please do not hesitate to call me. I look forward to following Mr. Eva Feliciano along with you. Sincerely, Dominick Blank MD

## 2018-10-02 NOTE — MR AVS SNAPSHOT
[address] [facility] [address] [facility] [id number] [phone] Patient: Carol Mccollum MRN: [id number] TRR:[id number] Visit Information Date & Time Provider Department Dept. Phone Encounter #  
 10/2/2018 10:40 AM Kasie Quintero MD [facility] Neurology [facility] [phone] [id number] Follow-up Instructions Return if symptoms worsen or fail to improve. Your Appointments 1/7/2019  8:00 AM  
ESTABLISHED PATIENT with Nereyda Amaro MD  
[facility] ([address]) Appt Note: 6 mo fup  
 [address] [facility] [place] [address] 1007 [facility]  
54 [facility] [address] [address] Upcoming Health Maintenance Date Due Hepatitis C Screening 1945 FOOT EXAM Q1 3/18/1955 EYE EXAM RETINAL OR DILATED Q1 3/18/1955 DTaP/Tdap/Td series (1 - Tdap) 3/18/1966 Shingrix Vaccine Age 50> (1 of 2) 3/18/1995 GLAUCOMA SCREENING Q2Y 3/18/2010 Pneumococcal 65+ Low/Medium Risk (1 of 2 - PCV13) 3/18/2010 MICROALBUMIN Q1 11/22/2017 MEDICARE YEARLY EXAM 3/14/2018 Influenza Age 5 to Adult 8/1/2018 HEMOGLOBIN A1C Q6M 2/28/2019 LIPID PANEL Q1 8/30/2019 FOBT Q 1 YEAR AGE 50-75 8/30/2019 Allergies as of 10/2/2018  Review Complete On: 10/2/2018 By: Denia Seymour LPN No Known Allergies Current Immunizations  Reviewed on 2/8/2017 Name Date Influenza Vaccine 11/23/2016 Not reviewed this visit You Were Diagnosed With   
  
 Codes Comments Cerebrovascular accident (CVA), unspecified mechanism [facility]    -  Primary ICD-10-CM: I63.9 ICD-9-CM: 434.91 Vitals BP Pulse Weight(growth percentile) SpO2 BMI Smoking Status 122/70 76 236 lb (107 kg) 98% 31.14 kg/m2 Former Smoker BMI and BSA Data  Body Mass Index Body Surface Area  
 31.14 kg/m 2 2.35 m 2  
  
  
 Preferred Pharmacy Pharmacy Name Phone [facility] [address], [facility], [address] [phone] Your Updated Medication List  
  
   
This list is accurate as of 10/2/18 11:17 AM.  Always use your most recent med list. amLODIPine 10 mg tablet Commonly known as:  Pasty Pall Take 10 mg by mouth daily. aspirin delayed-release 81 mg tablet Take 81 mg by mouth daily. CIALIS 5 mg tablet Generic drug:  tadalafil Take 5 mg by mouth daily. clopidogrel 75 mg Tab Commonly known as:  PLAVIX Take 1 Tab by mouth daily. CRESTOR 10 mg tablet Generic drug:  rosuvastatin Take 10 mg by mouth daily. famotidine 20 mg tablet Commonly known as:  PEPCID Take 1 Tab by mouth two (2) times a day. JENTADUETO 2.5-1,000 mg per tablet Generic drug:  linagliptin-metFORMIN Take 1 Tab by mouth two (2) times daily (with meals). LEXAPRO 5 mg tablet Generic drug:  escitalopram oxalate Take 5 mg by mouth daily. lisinopril-hydroCHLOROthiazide 20-25 mg per tablet Commonly known as:  Sheron Cooler Take 1 Tab by mouth daily. metFORMIN  mg tablet Commonly known as:  GLUCOPHAGE XR Take 1,000 mg by mouth daily. metoprolol succinate 25 mg XL tablet Commonly known as:  TOPROL-XL Take  by mouth daily. TYLENOL EXTRA STRENGTH 500 mg tablet Generic drug:  acetaminophen Take 1,000 mg by mouth every six (6) hours as needed for Pain. Follow-up Instructions Return if symptoms worsen or fail to improve. Patient Instructions PRESCRIPTION REFILL POLICY [facility] Statement to Patients April 1, 2014 In an effort to ensure the large volume of patient prescription refills is processed in the most efficient and expeditious manner, we are asking our patients to assist us by calling your Pharmacy for all prescription refills, this will include also your  Mail Order Pharmacy. The pharmacy will contact our office electronically to continue the refill process. Please do not wait until the last minute to call your pharmacy. We need at least 48 hours (2days) to fill prescriptions. We also encourage you to call your pharmacy before going to  your prescription to make sure it is ready. With regard to controlled substance prescription refill requests (narcotic refills) that need to be picked up at our office, we ask your cooperation by providing us with at least 72 hours (3days) notice that you will need a refill. We will not refill narcotic prescription refill requests after 4:00pm on any weekday, Monday through Thursday, or after 2:00pm on Fridays, or on the weekends. We encourage everyone to explore another way of getting your prescription refill request processed using [employer], our patient web portal through our electronic medical record system. [employer] is an efficient and effective way to communicate your medication request directly to the office and  downloadable as an alex on your smart phone . [employer] also features a review functionality that allows you to view your medication list as well as leave messages for your physician. Are you ready to get connected? If so please review the attatched instructions or speak to any of our staff to get you set up right away! Thank you so much for your cooperation. Should you have any questions please contact our Practice Administrator. The Physicians and Staff,  [facility] Stroke: Care Instructions Your Care Instructions You have had a stroke. This means that the blood flow to a part of your brain was blocked for some time, which damages the nerve cells in that part of the brain. The part of your body controlled by that part of your brain may not function properly now. The brain is an amazing organ that can heal itself to some degree. The stroke you had damaged part of your brain. But other parts of your brain may take over in some way for the damaged areas. You have already started this process. Your doctor will talk with you about what you can do to prevent another stroke. High blood pressure, high cholesterol, and diabetes are all risk factors for stroke. If you have any of these conditions, work with your doctor to make sure they are under control. Other risk factors for stroke include being overweight, smoking, and not getting regular exercise. Going home may be hard for you and your family. The more you can try to do for yourself, the better. Remember to take each day one at a time. Follow-up care is a key part of your treatment and safety. Be sure to make and go to all appointments, and call your doctor if you are having problems. It's also a good idea to know your test results and keep a list of the medicines you take. How can you care for yourself at home? 
  · Enter a stroke rehabilitation (rehab) program, if your doctor recommends it. Physical, speech, and occupational therapies can help you manage bathing, dressing, eating, and other basics of daily living.  
  · Do not drive until your doctor says it is okay.  
  · It is normal to feel sad or depressed after a stroke. If these feelings last, talk to your doctor.  
  · If you are having problems with urine leakage, go to the bathroom at regular times, including when you first wake up and at bedtime. Also, limit fluids after dinner.  
  · If you are constipated, drink plenty of fluids, enough so that your urine is light yellow or clear like water. If you have kidney, heart, or liver disease and have to limit fluids, talk with your doctor before you increase the amount of fluids you drink. Set up a regular time for using the toilet.  If you continue to have constipation, your doctor may suggest using a bulking agent, such as Metamucil, or a stool softener, laxative, or enema. Medicines 
  · Take your medicines exactly as prescribed. Call your doctor if you think you are having a problem with your medicine. You may be taking several medicines. ACE (angiotensin-converting enzyme) inhibitors, angiotensin II receptor blockers (ARBs), beta-blockers, diuretics (water pills), and calcium channel blockers control your blood pressure. Statins help lower cholesterol. Your doctor may also prescribe medicines for depression, pain, sleep problems, anxiety, or agitation.  
  · If your doctor has given you a blood thinner to prevent another stroke, be sure you get instructions about how to take your medicine safely. Blood thinners can cause serious bleeding problems.  
  · Do not take any over-the-counter medicines or herbal products without talking to your doctor first.  
  · If you take birth control pills or hormone therapy, talk to your doctor about whether they are right for you.  
 For family members and caregivers 
  · Make the home safe. Set up a room so that your loved one does not have to climb stairs. Be sure the bathroom is on the same floor. Move throw rugs and furniture that could cause falls. Make sure that the lighting is good. Put grab bars and seats in tubs and showers.  
  · Find out what your loved one can do and what he or she needs help with. Try not to do things for your loved one that your loved one can do on his or her own. Help him or her learn and practice new skills.  
  · Visit and talk with your loved one often. Try doing activities together that you both enjoy, such as playing cards or board games. Keep in touch with your loved one's friends as much as you can. Encourage them to visit.  
  · Take care of yourself. Do not try to do everything yourself. Ask other family members to help.  Eat well, get enough rest, and take time to do things that you enjoy. Keep up with your own doctor visits, and make sure to take your medicines regularly. Get out of the house as much as you can. Join a local support group. Find out if you qualify for home health care visits to help with rehab or for adult day care. When should you call for help? Call 911 anytime you think you may need emergency care. For example, call if: 
  · You have signs of another stroke. These may include: 
¨ Sudden numbness, tingling, weakness, or loss of movement in your face, arm, or leg, especially on only one side of your body. ¨ Sudden vision changes. ¨ Sudden trouble speaking. ¨ Sudden confusion or trouble understanding simple statements. ¨ Sudden problems with walking or balance. ¨ A sudden, severe headache that is different from past headaches. Call 911 even if these symptoms go away in a few minutes.  
 Call your doctor now or seek immediate medical care if: 
  · You have new symptoms that may be related to your stroke, such as falls or trouble swallowing.  
 Watch closely for changes in your health, and be sure to contact your doctor if you have any problems. Where can you learn more? Go to http://vic-sony.info/. Enter K781 in the search box to learn more about \"Stroke: Care Instructions. \" Current as of: November 21, 2017 Content Version: 11.7 © 6276-5998 Healthwise, Incorporated. Care instructions adapted under license by [employer] (which disclaims liability or warranty for this information). If you have questions about a medical condition or this instruction, always ask your healthcare professional. [place] [address] any warranty or liability for your use of this information. Introducing [facility]! [facility] introduces [facility] patient portal. Now you can access parts of your medical record, email your doctor's office, and request medication refills online.    
 
1. In your internet browser, go to [email or web address] 2. Click on the First Time User? Click Here link in the Sign In box. You will see the New Member Sign Up page. 3. Enter your [employer] Access Code exactly as it appears below. You will not need to use this code after youve completed the sign-up process. If you do not sign up before the expiration date, you must request a new code. · [employer] Access Code: [email or web address] Expires: 11/28/2018  4:24 PM 
 
4. Enter the last four digits of your Social Security Number (xxxx) and Date of Birth (mm/dd/yyyy) as indicated and click Submit. You will be taken to the next sign-up page. 5. Create a ReviewZAPt ID. This will be your [employer] login ID and cannot be changed, so think of one that is secure and easy to remember. 6. Create a [employer] password. You can change your password at any time. 7. Enter your Password Reset Question and Answer. This can be used at a later time if you forget your password. 8. Enter your e-mail address. You will receive e-mail notification when new information is available in [address] 9. Click Sign Up. You can now view and download portions of your medical record. 10. Click the Download Summary menu link to download a portable copy of your medical information. If you have questions, please visit the Frequently Asked Questions section of the [employer] website. Remember, [employer] is NOT to be used for urgent needs. For medical emergencies, dial 911. Now available from your iPhone and Android! Please provide this summary of care documentation to your next provider. Your primary care clinician is listed as Philip 116. If you have any questions after today's visit, please call [phone].

## 2019-01-07 ENCOUNTER — CLINICAL SUPPORT (OUTPATIENT)
Dept: CARDIOLOGY CLINIC | Age: 74
End: 2019-01-07

## 2019-01-07 ENCOUNTER — TELEPHONE (OUTPATIENT)
Dept: CARDIOLOGY CLINIC | Age: 74
End: 2019-01-07

## 2019-01-07 ENCOUNTER — OFFICE VISIT (OUTPATIENT)
Dept: CARDIOLOGY CLINIC | Age: 74
End: 2019-01-07

## 2019-01-07 VITALS
HEIGHT: 73 IN | DIASTOLIC BLOOD PRESSURE: 70 MMHG | HEART RATE: 68 BPM | SYSTOLIC BLOOD PRESSURE: 122 MMHG | WEIGHT: 239 LBS | BODY MASS INDEX: 31.68 KG/M2 | OXYGEN SATURATION: 98 %

## 2019-01-07 DIAGNOSIS — E78.5 DYSLIPIDEMIA: Primary | ICD-10-CM

## 2019-01-07 DIAGNOSIS — E78.5 DYSLIPIDEMIA: ICD-10-CM

## 2019-01-07 DIAGNOSIS — E11.9 TYPE 2 DIABETES MELLITUS WITHOUT COMPLICATION, WITHOUT LONG-TERM CURRENT USE OF INSULIN (HCC): ICD-10-CM

## 2019-01-07 DIAGNOSIS — Z95.1 S/P CABG X 1: ICD-10-CM

## 2019-01-07 DIAGNOSIS — I63.9 CEREBROVASCULAR ACCIDENT (CVA), UNSPECIFIED MECHANISM (HCC): ICD-10-CM

## 2019-01-07 DIAGNOSIS — I25.10 CORONARY ARTERY DISEASE INVOLVING NATIVE CORONARY ARTERY OF NATIVE HEART WITHOUT ANGINA PECTORIS: ICD-10-CM

## 2019-01-07 DIAGNOSIS — I25.10 ATHEROSCLEROSIS OF NATIVE CORONARY ARTERY OF NATIVE HEART WITHOUT ANGINA PECTORIS: ICD-10-CM

## 2019-01-07 DIAGNOSIS — I10 HTN (HYPERTENSION), BENIGN: ICD-10-CM

## 2019-01-07 NOTE — PROGRESS NOTES
[id number] [facility], [address] [facility]    Office [phone]  Fax [phone]       Pham Iyer is a 68 y.o. male. Last seen 6 months ago. Assessment  Encounter Diagnoses   Name Primary?  Dyslipidemia Yes    Coronary artery disease involving native coronary artery of native heart without angina pectoris     HTN (hypertension), benign     S/P CABG x 1     Type 2 diabetes mellitus without complication, without long-term current use of insulin ([address]     Atherosclerosis of native coronary artery of native heart without angina pectoris     Cerebrovascular accident (CVA), unspecified mechanism ([facility] [address]        Recommendations:  Mr. Melissa Healy has CAD s/p hybrid 1V CABG (off pump LIMA LAD), PCI D1 with JT January 2017. Stress nuclear study 1 year ago was normal. Continue daily aspirin. Drivers of CAD progression include T2DM and HTN. Normotensive on current treatment. Recent white matter stroke with evidence of old BG stroke by MRI. Etiology likely small vessel disease due to HTN and DM. Will screen for AF with 30 day monitor given his risk profile. Moderate LAE noted by recent echo. He saw Dr. Carmela Mackenzie recently, who only reccomended DAPT for 3 months. Lipids and DM managed by Dr. Aries Weathers. Recent A1c 6.6%, LDL-c 28. He has chronic microcytic anemia, but iron studies have been normal.      Follow-up Disposition:  Return in about 6 months (around 7/7/2019). Subjective:  Mr. Melissa Healy suffered an acute left deep white matter stroke in 8/2018. Neurovascular imaging was unremarkable. Echo demonstrated EF 60%, moderate LAE, negative bubble study. He has no hx of AF. Plavix was added to his regimen. Today, he states he is doing well. He has noticed increased salivation since his stroke. He experienced fatigue, facial droop and slurred speech prior to previous stroke. 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  He states he was told by his neurologist he could discontinue Plavix. He is adherent with a daily baby aspirin. He states his DM is under good control. He underwent prostate biopsy last week by Dr. Juan C Hubbard, pathology pending. Patient denies any exertional chest pain, dyspnea, palpitations, syncope, orthopnea, edema or paroxysmal nocturnal dyspnea. He is here with his wife today. Cardiac risk factors   HTN yes  DM yes    Cardiac testing  Cath 5/21/2004 - EF 60%, mild to moderate coronary calcification, mild diffuse plaque     Exercise cardiolite 6/2011 - normal perfusion, EF 50%    Echo at [facility] 12/18/14 - EF 60-65%, mild LAE,     Stress echo 2/18/15 - 6 min 30 sec, 2-3 mm ST depression but normal stress echo study, resting EF 60%    Exercise cardiolite 10/17/16 - 5 min, mild reversible defects - distal anterior, distal inferior and apex. EF 53%, mild LV dilatation with stress    Cath 11/8/2016 - EDP 17, EF 60%, no AV gradient, mod-severe cor Ca2+, LM normal, LAD mid diffuse disease (70%), D1 focal 85% mid, LCX mild plaque, RCA large dom, mid 50%. Exercise cardiolite 12/22/17 - 6 min, normal perfusion, LVEF 49%    /18 - Sinus Bradry 57, first degree AV block, otherwise normal    Past Medical History:   Diagnosis Date    CAD (coronary artery disease) 01/23/2017    stent    DJD (degenerative joint disease) of knee 11/2013    left TKR    ED (erectile dysfunction)     Essential hypertension     Hyperlipidemia     T2DM (type 2 diabetes mellitus) (HCC)     Thalassemia minor         Current Outpatient Medications   Medication Sig Dispense Refill    rosuvastatin (CRESTOR) 10 mg tablet Take 10 mg by mouth daily.  metFORMIN ER (GLUCOPHAGE XR) 500 mg tablet Take 1,000 mg by mouth daily.  lisinopril-hydroCHLOROthiazide (PRINZIDE, ZESTORETIC) 20-25 mg per tablet Take 1 Tab by mouth daily.  escitalopram oxalate (LEXAPRO) 5 mg tablet Take 5 mg by mouth daily.       acetaminophen (TYLENOL EXTRA STRENGTH) 500 mg tablet Take 1,000 mg by mouth every six (6) hours as needed for Pain.  amLODIPine (NORVASC) 10 mg tablet Take 10 mg by mouth daily.  aspirin delayed-release 81 mg tablet Take 81 mg by mouth daily.  linagliptin-metFORMIN (JENTADUETO) 2.5-1,000 mg per tablet Take 1 Tab by mouth two (2) times daily (with meals). No Known Allergies     . Review of Systems  Constitutional: Negative for fever, chills, and diaphoresis. Respiratory: Negative for cough, hemoptysis, sputum production, and wheezing. Cardiovascular: Negative for chest pain, palpitations, orthopnea, claudication, leg swelling and PND. Gastrointestinal: Negative for heartburn, nausea, vomiting, blood in stool and melena. Genitourinary: Negative for dysuria and flank pain. Musculoskeletal: Negative for joint pain and back pain. Skin: Negative for rash. Neurological: Negative for focal weakness, seizures, loss of consciousness, weakness and headaches. Endo/Heme/Allergies: Does not bruise/bleed easily. Psychiatric/Behavioral: Negative for memory loss. The patient does not have insomnia. Physical Exam  Visit Vitals  /70   Pulse 68   Ht 6' 1\" (1.854 m)   Wt 239 lb (108.4 kg)   SpO2 98%   BMI 31.53 kg/m²     Wt Readings from Last 3 Encounters:   01/07/19 239 lb (108.4 kg)   10/02/18 236 lb (107 kg)   08/29/18 232 lb (105.2 kg)      General - well developed well nourished, slightly disheveled appearance  Neck - JVP normal, thyroid nl  Cardiac - normal S1,S2, no murmurs, rubs or gallops.  No clicks  Vascular - carotids without bruits, radials, femorals and pedal pulses equal bilateral  Lungs - clear to auscultation bilaterals, no rales ,wheezing or rhonchi  Abd - soft nontender, no HSM, no abd bruits  Extremities - no edema   Skin - no rash  Neuro - nonfocal  Psych - normal mood and affect    Cardiographics  ECG 2/2/15 - SR, interatrial conduction delay  EKG 3/23/16 - SR 66  Labs 8/8/16 - glucose 153, LDL-p 962, LDL-c 64, HDL-c 31, small LDL-p 786  Exercise cardiolite 12/22/17 - 6 min, normal perfusion, LVEF 49%  /18 - Sinus Bradry 57, first degree AV block, otherwise normal      Written by Charis Rao, as dictated by Dr. Kelly Julian.    Kelly Julian MD

## 2019-01-07 NOTE — PROGRESS NOTES
Visit Vitals  /70   Pulse 68   Ht 6' 1\" (1.854 m)   Wt 239 lb (108.4 kg)   SpO2 98%   BMI 31.53 kg/m²     Stroke in august of this year. Denies CP,SOB,diziness or edema.

## 2019-01-07 NOTE — TELEPHONE ENCOUNTER
----- Message from Nina Cardenas MD sent at 2019  8:30 AM EST -----  Regardin day loop monitor  Can you arrange for loop recorder?     Dx stroke, HTN, DM, LAE

## 2019-01-28 ENCOUNTER — TELEPHONE (OUTPATIENT)
Dept: CARDIOLOGY CLINIC | Age: 74
End: 2019-01-28

## 2019-01-28 NOTE — TELEPHONE ENCOUNTER
----- Message from Derek Reyes NP sent at 1/28/2019 11:05 AM EST -----  Please call . Dana White and inquire about his 30 day monitor. We received notification from [employer] that he has not applied the monitor and begun to gather data. It was shipped to him and they have attempted to call but have not been able to reach him. Saw Dr. Mark Kearns 1/7/19. Monitor is to further eval for AF in the setting of recent stroke.

## 2019-01-28 NOTE — TELEPHONE ENCOUNTER
Latonia Ocasio has been wearing monitor since 1/11/19 when he received it. Verito has talked to him about returning monitor but he has told them, he is wearing it. Madelyn Diallo can you check on this?

## 2019-05-08 ENCOUNTER — TELEPHONE (OUTPATIENT)
Dept: CARDIOLOGY CLINIC | Age: 74
End: 2019-05-08

## 2019-05-08 NOTE — TELEPHONE ENCOUNTER
----- Message from María Frank NP sent at 5/7/2019  5:20 PM EDT -----  He has follow up on 7/17/19, but this 30 day was ordered at his last visit in 1/2019 and I am just getting the results. Please notify him that Dr. Bee Search reviewed results and there was no evidence of atrial fibrillation. Will see him again in July, as previously scheduled.

## 2019-09-13 ENCOUNTER — HOSPITAL ENCOUNTER (OUTPATIENT)
Dept: MRI IMAGING | Age: 74
Discharge: HOME | End: 2019-09-13
Attending: ORTHOPAEDIC SURGERY
Payer: MEDICARE

## 2019-09-13 DIAGNOSIS — M25.561 RIGHT KNEE PAIN: ICD-10-CM

## 2019-09-13 PROCEDURE — 73721 MRI JNT OF LWR EXTRE W/O DYE: CPT

## 2019-12-16 ENCOUNTER — OFFICE VISIT (OUTPATIENT)
Dept: CARDIOLOGY CLINIC | Age: 74
End: 2019-12-16

## 2019-12-16 VITALS
HEART RATE: 72 BPM | BODY MASS INDEX: 31.94 KG/M2 | RESPIRATION RATE: 20 BRPM | WEIGHT: 241 LBS | OXYGEN SATURATION: 99 % | HEIGHT: 73 IN | DIASTOLIC BLOOD PRESSURE: 60 MMHG | SYSTOLIC BLOOD PRESSURE: 114 MMHG

## 2019-12-16 DIAGNOSIS — E78.5 DYSLIPIDEMIA: ICD-10-CM

## 2019-12-16 DIAGNOSIS — I25.10 CORONARY ARTERY DISEASE INVOLVING NATIVE CORONARY ARTERY OF NATIVE HEART WITHOUT ANGINA PECTORIS: Primary | ICD-10-CM

## 2019-12-16 DIAGNOSIS — E11.9 TYPE 2 DIABETES MELLITUS WITHOUT COMPLICATION, WITHOUT LONG-TERM CURRENT USE OF INSULIN (HCC): ICD-10-CM

## 2019-12-16 DIAGNOSIS — Z95.1 S/P CABG X 1: ICD-10-CM

## 2019-12-16 DIAGNOSIS — I10 HTN (HYPERTENSION), BENIGN: ICD-10-CM

## 2019-12-16 NOTE — LETTER
12/27/19 Patient: Rosa Ortega YOB: 1945 Date of Visit: 12/16/2019 Netta Lujan MD 
[address] [facility] [address] VIA Facsimile: [phone] Dear Netta Lujan MD, Thank you for referring Mr. Rosa Ortega to [address] [place] [address] for evaluation. My notes for this consultation are attached. If you have questions, please do not hesitate to call me. I look forward to following your patient along with you. Sincerely, Nadeen Lakhani MD

## 2019-12-16 NOTE — PROGRESS NOTES
[id number], [address] [facility]    Office [phone]  Fax [phone]       Salena Johnson is a 76 y.o. male. Last seen by me 11 months ago. Assessment  Encounter Diagnoses   Name Primary?  Coronary artery disease involving native coronary artery of native heart without angina pectoris Yes    S/P CABG x 1     HTN (hypertension), benign     Dyslipidemia     Type 2 diabetes mellitus without complication, without long-term current use of insulin ([facility])        Recommendations:    CAD s/p hybrid 1V CABG (off pump LIMA LAD), PCI D1 with JT January 2017. Stress nuclear study 2017 was normal. Drivers of CAD progression include T2DM and HTN. - Continue ASA 81mg/d    HTN, normotensive on current treatment. Hx of white matter stroke 2018. Etiology likely small vessel disease due to HTN and DM. Moderate LAE noted by recent echo Aug 2018. 30 day monitor demonstrated no evidence of AF. Lipids and DM managed by Dr. Floyd Sam. No recent labs available. Long discussion about clean eating, low carb diet, and regular exercise. He is not terribly motivated. Follow-up and Dispositions    · Return in about 6 months (around 6/16/2020). Subjective:    Salena Johnson reports he has tennis elbow but he does not play tennis. He leads a sedentary lifestyle. He does not like walking d/t knee pain. He has mild FUCHS with mild activity around the house. Patient denies any exertional chest pain, dyspnea, palpitations, syncope, orthopnea, edema or paroxysmal nocturnal dyspnea. He reports gaining about 15 pounds recently. He states his DM is under good control on Metformin    He is here with his wife today.     Cardiac risk factors   HTN yes  DM yes    Cardiac testing  Cath 5/21/2004 - EF 60%, mild to moderate coronary calcification, mild diffuse plaque     Exercise cardiolite 6/2011 - normal perfusion, EF 50%    Echo at [facility] 12/18/14 - EF 60-65%, mild LAE, Stress echo 2/18/15 - 6 min 30 sec, 2-3 mm ST depression but normal stress echo study, resting EF 60%    Exercise cardiolite 10/17/16 - 5 min, mild reversible defects - distal anterior, distal inferior and apex. EF 53%, mild LV dilatation with stress    Cath 11/8/2016 - EDP 17, EF 60%, no AV gradient, mod-severe cor Ca2+, LM normal, LAD mid diffuse disease (70%), D1 focal 85% mid, LCX mild plaque, RCA large dom, mid 50%. Exercise cardiolite 12/22/17 - 6 min, normal perfusion, LVEF 49%    /18 - Sinus Bradry 57, first degree AV block, otherwise normal    30 day Holter monitor 1/18/19 - 2/9/19- SR. No evidence of AF. Past Medical History:   Diagnosis Date    CAD (coronary artery disease) 01/23/2017    stent    DJD (degenerative joint disease) of knee 11/2013    left TKR    ED (erectile dysfunction)     Essential hypertension     Hyperlipidemia     T2DM (type 2 diabetes mellitus) ([facility])     Thalassemia minor         Current Outpatient Medications   Medication Sig Dispense Refill    rosuvastatin (CRESTOR) 10 mg tablet Take 10 mg by mouth daily.  metFORMIN ER (GLUCOPHAGE XR) 500 mg tablet Take 1,000 mg by mouth daily.  lisinopril-hydroCHLOROthiazide (PRINZIDE, ZESTORETIC) 20-25 mg per tablet Take 1 Tab by mouth daily.  escitalopram oxalate (LEXAPRO) 5 mg tablet Take 5 mg by mouth daily.  amLODIPine (NORVASC) 10 mg tablet Take 10 mg by mouth daily.  aspirin delayed-release 81 mg tablet Take 81 mg by mouth daily.  linagliptin-metFORMIN (JENTADUETO) 2.5-1,000 mg per tablet Take 1 Tab by mouth two (2) times daily (with meals).  acetaminophen (TYLENOL EXTRA STRENGTH) 500 mg tablet Take 1,000 mg by mouth every six (6) hours as needed for Pain. No Known Allergies     . Review of Systems  Constitutional: Negative for fever, chills, and diaphoresis. Respiratory: Negative for cough, hemoptysis, sputum production, and wheezing.   +FUCHS  Cardiovascular: Negative for chest pain, palpitations, orthopnea, claudication, leg swelling and PND. Gastrointestinal: Negative for heartburn, nausea, vomiting, blood in stool and melena. Genitourinary: Negative for dysuria and flank pain. Musculoskeletal: Negative for joint pain and back pain. +elbow pain   Skin: Negative for rash. Neurological: Negative for focal weakness, seizures, loss of consciousness, weakness and headaches. Endo/Heme/Allergies: Does not bruise/bleed easily. Psychiatric/Behavioral: Negative for memory loss. The patient does not have insomnia. Physical Exam  Visit Vitals  /60   Pulse 72   Resp 20   Ht 6' 1\" (1.854 m)   Wt 241 lb (109.3 kg)   SpO2 99%   BMI 31.80 kg/m²     Wt Readings from Last 3 Encounters:   12/16/19 241 lb (109.3 kg)   01/07/19 239 lb (108.4 kg)   10/02/18 236 lb (107 kg)      General - well developed well nourished, slightly disheveled appearance  Neck - JVP normal, thyroid nl  Cardiac - normal S1,S2, no murmurs, rubs or gallops. No clicks  Vascular - carotids without bruits, radials, femorals and pedal pulses equal bilateral  Lungs - clear to auscultation bilaterals, no rales ,wheezing or rhonchi  Abd - soft nontender, no HSM, no abd bruits  Extremities - no edema   Skin - no rash  Neuro - nonfocal  Psych - normal mood and affect    Cardiographics  ECG 2/2/15 - SR, interatrial conduction delay  EKG 3/23/16 - SR 66  Labs 8/8/16 - glucose 153, LDL-p 962, LDL-c 64, HDL-c 31, small LDL-p 786  Exercise cardiolite 12/22/17 - 6 min, normal perfusion, LVEF 49%  /18 - Sinus Bradry 57, first degree AV block, otherwise normal  EKG 12/16/19 - SR 59,       Written by Jez Carrillo, as dictated by Lois Herrera M.D.      Lois Herrera MD

## 2019-12-16 NOTE — PROGRESS NOTES
Visit Vitals  /60   Pulse 72   Resp 20   Ht 6' 1\" (1.854 m)   Wt 241 lb (109.3 kg)   SpO2 99%   BMI 31.80 kg/m²     Chronic fatigue, SOB.   Denies CP

## 2019-12-16 NOTE — PATIENT INSTRUCTIONS
Talk to Torey Carter MD about Cornel Thomas and/or [place] 
-Make a goal of walking 10,000 steps daily

## 2020-06-17 ENCOUNTER — OFFICE VISIT (OUTPATIENT)
Dept: CARDIOLOGY CLINIC | Age: 75
End: 2020-06-17

## 2020-06-17 VITALS
WEIGHT: 230 LBS | BODY MASS INDEX: 30.48 KG/M2 | SYSTOLIC BLOOD PRESSURE: 122 MMHG | OXYGEN SATURATION: 98 % | DIASTOLIC BLOOD PRESSURE: 70 MMHG | HEIGHT: 73 IN | HEART RATE: 72 BPM

## 2020-06-17 DIAGNOSIS — I25.10 CORONARY ARTERY DISEASE INVOLVING NATIVE CORONARY ARTERY OF NATIVE HEART WITHOUT ANGINA PECTORIS: ICD-10-CM

## 2020-06-17 DIAGNOSIS — R06.09 DOE (DYSPNEA ON EXERTION): ICD-10-CM

## 2020-06-17 DIAGNOSIS — E11.9 TYPE 2 DIABETES MELLITUS WITHOUT COMPLICATION, WITHOUT LONG-TERM CURRENT USE OF INSULIN (HCC): ICD-10-CM

## 2020-06-17 DIAGNOSIS — E78.5 DYSLIPIDEMIA: ICD-10-CM

## 2020-06-17 DIAGNOSIS — I10 HTN (HYPERTENSION), BENIGN: Primary | ICD-10-CM

## 2020-06-17 DIAGNOSIS — Z95.1 S/P CABG X 1: ICD-10-CM

## 2020-06-17 DIAGNOSIS — T73.3XXA FATIGUE DUE TO EXCESSIVE EXERTION, INITIAL ENCOUNTER: ICD-10-CM

## 2020-06-17 RX ORDER — TADALAFIL 5 MG/1
5 TABLET ORAL
COMMUNITY
End: 2021-11-22 | Stop reason: ALTCHOICE

## 2020-06-17 NOTE — PROGRESS NOTES
Chest pain: no  Shortness of breath: yes, think age, no exercise  Edema: no  Palpitations: no  Dizziness: no    Get tired easily      New diagnosis/Surgeries: no    ER/Hospitalizations:no    Refills: no

## 2020-06-17 NOTE — PROGRESS NOTES
[id number], [address] [facility]    Office [phone]  Fax [phone]       Mandi Yun is a 76 y.o. male. Last seen by me 6 months ago. Assessment  Encounter Diagnoses   Name Primary?  HTN (hypertension), benign Yes    Coronary artery disease involving native coronary artery of native heart without angina pectoris     S/P CABG x 1     Type 2 diabetes mellitus without complication, without long-term current use of insulin ([address]     Dyslipidemia        Recommendations:    CAD s/p hybrid 1V CABG (off pump LIMA LAD), PCI D1 with JT January 2017. Stress nuclear study 2017 was normal. In light of recent FUCHS and fatigue, likely deconditioning, favor coronary reassessment. Drivers of CAD progression include T2DM and HTN. - schedule exercise cardiolite near future  - Continue ASA 81mg/d    HTN, normotensive on current treatment. Hx of white matter stroke 2018. Etiology likely small vessel disease due to HTN and DM. Moderate LAE noted by recent echo Aug 2018. 30 day monitor demonstrated no evidence of AF. Lipids and DM managed by Dr. Asia Rodríguez. No recent labs available. Phone follow up after reviewing tests           Subjective:    Mandi Yun reports FUCHS and fatigue in the past 6 weeks or so. No chest pain. He leads a sedentary lifestyle. He does not like walking d/t knee pain. He has mild FUCHS with mild activity around the house. Patient denies any exertional chest pain, palpitations, syncope, orthopnea, edema or paroxysmal nocturnal dyspnea.      He is working part time    No recent labs available      Cardiac risk factors   HTN yes  DM yes    Cardiac testing  Cath 5/21/2004 - EF 60%, mild to moderate coronary calcification, mild diffuse plaque     Exercise cardiolite 6/2011 - normal perfusion, EF 50%    Echo at [facility] 12/18/14 - EF 60-65%, mild LAE,     Stress echo 2/18/15 - 6 min 30 sec, 2-3 mm ST depression but normal stress echo study, resting EF 60%    Exercise cardiolite 10/17/16 - 5 min, mild reversible defects - distal anterior, distal inferior and apex. EF 53%, mild LV dilatation with stress    Cath 11/8/2016 - EDP 17, EF 60%, no AV gradient, mod-severe cor Ca2+, LM normal, LAD mid diffuse disease (70%), D1 focal 85% mid, LCX mild plaque, RCA large dom, mid 50%. Exercise cardiolite 12/22/17 - 6 min, normal perfusion, LVEF 49%    /18 - Sinus Bradry 57, first degree AV block, otherwise normal    30 day Holter monitor 1/18/19 - 2/9/19- SR. No evidence of AF. Past Medical History:   Diagnosis Date    CAD (coronary artery disease) 01/23/2017    stent    DJD (degenerative joint disease) of knee 11/2013    left TKR    ED (erectile dysfunction)     Essential hypertension     Hyperlipidemia     T2DM (type 2 diabetes mellitus) (HCC)     Thalassemia minor         Current Outpatient Medications   Medication Sig Dispense Refill    linagliptin-metFORMIN (JENTADUETO) 2.5-1,000 mg per tablet Take 1 Tab by mouth two (2) times daily (with meals).  rosuvastatin (CRESTOR) 10 mg tablet Take 10 mg by mouth daily.  metFORMIN ER (GLUCOPHAGE XR) 500 mg tablet Take 1,000 mg by mouth daily.  lisinopril-hydroCHLOROthiazide (PRINZIDE, ZESTORETIC) 20-25 mg per tablet Take 1 Tab by mouth daily.  escitalopram oxalate (LEXAPRO) 5 mg tablet Take 5 mg by mouth daily.  acetaminophen (TYLENOL EXTRA STRENGTH) 500 mg tablet Take 1,000 mg by mouth every six (6) hours as needed for Pain.  amLODIPine (NORVASC) 10 mg tablet Take 10 mg by mouth daily.  aspirin delayed-release 81 mg tablet Take 81 mg by mouth daily. No Known Allergies     . Review of Systems  Constitutional: Negative for fever, chills, and diaphoresis. Respiratory: Negative for cough, hemoptysis, sputum production, and wheezing. +FUCHS  Cardiovascular: Negative for chest pain, palpitations, orthopnea, claudication, leg swelling and PND.    Gastrointestinal: Negative for heartburn, nausea, vomiting, blood in stool and melena. Genitourinary: Negative for dysuria and flank pain. Musculoskeletal: Negative for joint pain and back pain. Skin: Negative for rash. Neurological: Negative for focal weakness, seizures, loss of consciousness, weakness and headaches. Endo/Heme/Allergies: Does not bruise/bleed easily. Psychiatric/Behavioral: Negative for memory loss. The patient does not have insomnia. Physical Exam  There were no vitals taken for this visit. Wt Readings from Last 3 Encounters:   12/16/19 241 lb (109.3 kg)   01/07/19 239 lb (108.4 kg)   10/02/18 236 lb (107 kg)      General - well developed well nourished, slightly disheveled appearance  Neck - JVP normal, thyroid nl  Cardiac - normal S1,S2, no murmurs, rubs or gallops.  No clicks  Vascular - carotids without bruits, radials, femorals and pedal pulses equal bilateral  Lungs - clear to auscultation bilaterals, no rales ,wheezing or rhonchi  Abd - soft nontender, no HSM, no abd bruits  Extremities - no edema   Skin - no rash  Neuro - nonfocal  Psych - normal mood and affect    Cardiographics  ECG 2/2/15 - SR, interatrial conduction delay  EKG 3/23/16 - SR 66  Labs 8/8/16 - glucose 153, LDL-p 962, LDL-c 64, HDL-c 31, small LDL-p 786  Exercise cardiolite 12/22/17 - 6 min, normal perfusion, LVEF 49%  /18 - Sinus Bradry 57, first degree AV block, otherwise normal  EKG 12/16/19 - SR 59,  [address], MD

## 2020-06-24 ENCOUNTER — TELEPHONE (OUTPATIENT)
Dept: CARDIOLOGY CLINIC | Age: 75
End: 2020-06-24

## 2020-06-24 NOTE — TELEPHONE ENCOUNTER
PTIDX2 instructed patient to co for COVID testing 2 days earlier on 7/14/20. patient will go to [address] [place] [address]. Reviewed stress nuclear instructions including no caffeine for 12 hours,comfortable shoes.

## 2020-07-10 ENCOUNTER — TELEPHONE (OUTPATIENT)
Dept: CARDIOLOGY CLINIC | Age: 75
End: 2020-07-10

## 2020-07-10 DIAGNOSIS — I25.118 CORONARY ARTERY DISEASE OF NATIVE HEART WITH STABLE ANGINA PECTORIS, UNSPECIFIED VESSEL OR LESION TYPE (HCC): Primary | ICD-10-CM

## 2020-07-14 ENCOUNTER — HOSPITAL ENCOUNTER (OUTPATIENT)
Dept: LAB | Age: 75
Discharge: HOME | End: 2020-07-14
Payer: MEDICARE

## 2020-07-14 DIAGNOSIS — I25.118 CORONARY ARTERY DISEASE OF NATIVE HEART WITH STABLE ANGINA PECTORIS, UNSPECIFIED VESSEL OR LESION TYPE (HCC): ICD-10-CM

## 2020-07-14 PROCEDURE — 87635 SARS-COV-2 COVID-19 AMP PRB: CPT

## 2020-07-15 LAB — SARS-COV-2, COV2NT: NOT DETECTED

## 2020-07-17 ENCOUNTER — TELEPHONE (OUTPATIENT)
Dept: CARDIOLOGY CLINIC | Age: 75
End: 2020-07-17

## 2020-07-22 ENCOUNTER — TELEPHONE (OUTPATIENT)
Dept: CARDIOLOGY CLINIC | Age: 75
End: 2020-07-22

## 2020-07-22 NOTE — TELEPHONE ENCOUNTER
Cardiac testing  Cath 5/21/2004 - EF 60%, mild to moderate coronary calcification, mild diffuse plaque     Exercise cardiolite 6/2011 - normal perfusion, EF 50%    Echo at [facility] 12/18/14 - EF 60-65%, mild LAE,     Stress echo 2/18/15 - 6 min 30 sec, 2-3 mm ST depression but normal stress echo study, resting EF 60%    Exercise cardiolite 10/17/16 - 5 min, mild reversible defects - distal anterior, distal inferior and apex. EF 53%, mild LV dilatation with stress    Cath 11/8/2016 - EDP 17, EF 60%, no AV gradient, mod-severe cor Ca2+, LM normal, LAD mid diffuse disease (70%), D1 focal 85% mid, LCX mild plaque, RCA large dom, mid 50%. Exercise cardiolite 12/22/17 - 6 min, normal perfusion, LVEF 49%    /18 - Sinus Bradry 57, first degree AV block, otherwise normal    30 day Holter monitor 1/18/19 - 2/9/19- SR. No evidence of AF. Exercise Cardiolite 7/21/20 - 6:30. Normal perfusion study. Fixed inferior defect consistent with attenuation artifact. No stress induced ischemia. EF 57%. Stable CAD, no ischemia    Findings reviewed with patient. He will work harder on exercise, healthy weight loss    Continue current Rx    RTC 6 months.

## 2021-01-01 NOTE — PROGRESS NOTES
See scanned report. Dr. Noris Medellin ordered study and Dr. Noris Medellin read study. ID verified per protocol. Test and risks explained. Consent signed after all patient questions answered. Patient developed dyspnea during test. At 3 minutes in recovery, patient without symptoms or complaints voiced. Type 1 Diabetic on Insulin

## 2021-01-13 LAB — PSA, EXTERNAL: 2.9

## 2021-05-15 ENCOUNTER — HOSPITAL ENCOUNTER (EMERGENCY)
Age: 76
Discharge: HOME | End: 2021-05-15
Attending: STUDENT IN AN ORGANIZED HEALTH CARE EDUCATION/TRAINING PROGRAM
Payer: MEDICARE

## 2021-05-15 ENCOUNTER — APPOINTMENT (OUTPATIENT)
Dept: CT IMAGING | Age: 76
End: 2021-05-15
Attending: NURSE PRACTITIONER
Payer: MEDICARE

## 2021-05-15 VITALS
WEIGHT: 268 LBS | HEART RATE: 69 BPM | TEMPERATURE: 98.5 F | RESPIRATION RATE: 18 BRPM | HEIGHT: 73 IN | SYSTOLIC BLOOD PRESSURE: 115 MMHG | BODY MASS INDEX: 35.52 KG/M2 | OXYGEN SATURATION: 96 % | DIASTOLIC BLOOD PRESSURE: 52 MMHG

## 2021-05-15 DIAGNOSIS — K52.9 ENTERITIS: Primary | ICD-10-CM

## 2021-05-15 LAB
ALBUMIN SERPL-MCNC: 4 G/DL (ref 3.5–5)
ALBUMIN/GLOB SERPL: 1.4 {RATIO} (ref 1.1–2.2)
ALP SERPL-CCNC: 62 U/L (ref 45–117)
ALT SERPL-CCNC: 28 U/L (ref 12–78)
ANION GAP SERPL CALC-SCNC: 6 MMOL/L (ref 5–15)
APPEARANCE UR: CLEAR
AST SERPL-CCNC: 18 U/L (ref 15–37)
BACTERIA URNS QL MICRO: NEGATIVE /HPF
BASOPHILS # BLD: 0 K/UL (ref 0–0.1)
BASOPHILS NFR BLD: 0 % (ref 0–1)
BILIRUB SERPL-MCNC: 1 MG/DL (ref 0.2–1)
BILIRUB UR QL: NEGATIVE
BUN SERPL-MCNC: 12 MG/DL (ref 6–20)
BUN/CREAT SERPL: 13 (ref 12–20)
CALCIUM SERPL-MCNC: 8.9 MG/DL (ref 8.5–10.1)
CHLORIDE SERPL-SCNC: 105 MMOL/L (ref 97–108)
CO2 SERPL-SCNC: 29 MMOL/L (ref 21–32)
COLOR UR: ABNORMAL
COMMENT, HOLDF: NORMAL
CREAT SERPL-MCNC: 0.96 MG/DL (ref 0.7–1.3)
DIFFERENTIAL METHOD BLD: ABNORMAL
EOSINOPHIL # BLD: 0.2 K/UL (ref 0–0.4)
EOSINOPHIL NFR BLD: 3 % (ref 0–7)
EPITH CASTS URNS QL MICRO: ABNORMAL /LPF
ERYTHROCYTE [DISTWIDTH] IN BLOOD BY AUTOMATED COUNT: 18 % (ref 11.5–14.5)
GLOBULIN SER CALC-MCNC: 2.9 G/DL (ref 2–4)
GLUCOSE SERPL-MCNC: 160 MG/DL (ref 65–100)
GLUCOSE UR STRIP.AUTO-MCNC: NEGATIVE MG/DL
HCT VFR BLD AUTO: 32.4 % (ref 36.6–50.3)
HEMOCCULT STL QL: NEGATIVE
HGB BLD-MCNC: 10.1 G/DL (ref 12.1–17)
HGB UR QL STRIP: NEGATIVE
HYALINE CASTS URNS QL MICRO: ABNORMAL /LPF (ref 0–5)
IMM GRANULOCYTES # BLD AUTO: 0 K/UL (ref 0–0.04)
IMM GRANULOCYTES NFR BLD AUTO: 0 % (ref 0–0.5)
KETONES UR QL STRIP.AUTO: NEGATIVE MG/DL
LEUKOCYTE ESTERASE UR QL STRIP.AUTO: NEGATIVE
LIPASE SERPL-CCNC: 94 U/L (ref 73–393)
LYMPHOCYTES # BLD: 1.8 K/UL (ref 0.8–3.5)
LYMPHOCYTES NFR BLD: 31 % (ref 12–49)
MCH RBC QN AUTO: 18.9 PG (ref 26–34)
MCHC RBC AUTO-ENTMCNC: 31.2 G/DL (ref 30–36.5)
MCV RBC AUTO: 60.6 FL (ref 80–99)
MONOCYTES # BLD: 0.4 K/UL (ref 0–1)
MONOCYTES NFR BLD: 7 % (ref 5–13)
NEUTS SEG # BLD: 3.3 K/UL (ref 1.8–8)
NEUTS SEG NFR BLD: 59 % (ref 32–75)
NITRITE UR QL STRIP.AUTO: NEGATIVE
NRBC # BLD: 0 K/UL (ref 0–0.01)
NRBC BLD-RTO: 0 PER 100 WBC
PH UR STRIP: 6.5 [PH] (ref 5–8)
PLATELET # BLD AUTO: 259 K/UL (ref 150–400)
PMV BLD AUTO: 9.4 FL (ref 8.9–12.9)
POTASSIUM SERPL-SCNC: 3.5 MMOL/L (ref 3.5–5.1)
PROT SERPL-MCNC: 6.9 G/DL (ref 6.4–8.2)
PROT UR STRIP-MCNC: 30 MG/DL
RBC # BLD AUTO: 5.35 M/UL (ref 4.1–5.7)
RBC #/AREA URNS HPF: ABNORMAL /HPF (ref 0–5)
RBC MORPH BLD: ABNORMAL
SAMPLES BEING HELD,HOLD: NORMAL
SODIUM SERPL-SCNC: 140 MMOL/L (ref 136–145)
SP GR UR REFRACTOMETRY: 1.01 (ref 1–1.03)
UROBILINOGEN UR QL STRIP.AUTO: 0.2 EU/DL (ref 0.2–1)
WBC # BLD AUTO: 5.7 K/UL (ref 4.1–11.1)
WBC URNS QL MICRO: ABNORMAL /HPF (ref 0–4)

## 2021-05-15 PROCEDURE — 99284 EMERGENCY DEPT VISIT MOD MDM: CPT

## 2021-05-15 PROCEDURE — 36415 COLL VENOUS BLD VENIPUNCTURE: CPT

## 2021-05-15 PROCEDURE — 82272 OCCULT BLD FECES 1-3 TESTS: CPT

## 2021-05-15 PROCEDURE — 74177 CT ABD & PELVIS W/CONTRAST: CPT

## 2021-05-15 PROCEDURE — 74011000636 HC RX REV CODE- 636: Performed by: STUDENT IN AN ORGANIZED HEALTH CARE EDUCATION/TRAINING PROGRAM

## 2021-05-15 PROCEDURE — 83690 ASSAY OF LIPASE: CPT

## 2021-05-15 PROCEDURE — 85025 COMPLETE CBC W/AUTO DIFF WBC: CPT

## 2021-05-15 PROCEDURE — 80053 COMPREHEN METABOLIC PANEL: CPT

## 2021-05-15 PROCEDURE — 81001 URINALYSIS AUTO W/SCOPE: CPT

## 2021-05-15 RX ORDER — CIPROFLOXACIN 500 MG/1
500 TABLET ORAL 2 TIMES DAILY
Qty: 14 TAB | Refills: 0 | Status: SHIPPED | OUTPATIENT
Start: 2021-05-15 | End: 2021-05-22

## 2021-05-15 RX ORDER — GLIPIZIDE 5 MG/1
5 TABLET ORAL DAILY
COMMUNITY
End: 2022-03-23 | Stop reason: SDUPTHER

## 2021-05-15 RX ORDER — METRONIDAZOLE 500 MG/1
500 TABLET ORAL 2 TIMES DAILY
Qty: 14 TAB | Refills: 0 | Status: SHIPPED | OUTPATIENT
Start: 2021-05-15 | End: 2021-05-22

## 2021-05-15 RX ADMIN — IOPAMIDOL 100 ML: 755 INJECTION, SOLUTION INTRAVENOUS at 10:28

## 2021-05-15 NOTE — ED TRIAGE NOTES
Patient arrived through triage c/o lower abdominal pain, fatigue, low back pain. [facility] and dx with constipation. He has tried 2 bottles of mag citrate yesterday without relief. Patient states he had a watery stool this morning. Patient resting in bed, bed in low position, call bell in reach.

## 2021-05-15 NOTE — ED PROVIDER NOTES
This is a 77-year-old male who presents ambulatory to the emergency room with complaints of lower back pain that started 3 days ago now radiating to his bilateral lower abdomen. [facility] and was diagnosed with constipation. He was prescribed magnesium citrate and has had 2 bottles. States he has had bowel movements since but the abdominal pain persist.  Denies any urinary urgency or frequency, hematuria. Does state that he sometimes has a difficult time passing his urine. Also states his stool is dark in color and when asked he said it was black. Denies any chest pain, shortness of breath, dizziness, nausea or vomiting, fever or chills. No history of GI bleed in the past.  Does not take blood thinners other than daily aspirin 81 mg. There are no further complaints at this time. Flynn Navarrete MD  Past Medical History:  01/23/2017: CAD (coronary artery disease)      Comment:  stent  11/2013: DJD (degenerative joint disease) of knee      Comment:  left TKR  No date: ED (erectile dysfunction)  No date: Essential hypertension  No date: Hyperlipidemia  No date: T2DM (type 2 diabetes mellitus) ([facility] [address]  No date: Thalassemia minor  Past Surgical History:  2004: HX HEART CATHETERIZATION      Comment:  mild CAD, EF 60-65%  11/2013: HX KNEE ARTHROSCOPY; Left  2013: HX KNEE REPLACEMENT;  Left  1968: HX SHOULDER REPLACEMENT; Right  01/19/2017: MO CABG, ARTERY-VEIN, SINGLE      Comment:  Olinda Khalil SABINE TAKEDOWN, LEFT ANTERIOR THORACOTOMY, OFF                PUMP CORONARY ARTERY BYPASS GRAFTING X1,  5/14/2004: STRESS TEST CARDIOLITE      Comment:  5 min, inferior ischemia, EF 60%  6/30/11: STRESS TEST CARDIOLITE      Comment:  4 min, normal perfusion EF 50%             Past Medical History:   Diagnosis Date    CAD (coronary artery disease) 01/23/2017    stent    DJD (degenerative joint disease) of knee 11/2013    left TKR    ED (erectile dysfunction)     Essential hypertension     Hyperlipidemia     T2DM (type 2 diabetes mellitus) ([facility] [address]     Thalassemia minor        Past Surgical History:   Procedure Laterality Date    HX HEART CATHETERIZATION      mild CAD, EF 60-65%    HX KNEE ARTHROSCOPY Left 2013    HX KNEE REPLACEMENT Left     HX SHOULDER REPLACEMENT Right     NE CABG, ARTERY-VEIN, SINGLE  2017    DAVINCI SABINE TAKEDOWN, LEFT ANTERIOR THORACOTOMY, OFF PUMP CORONARY ARTERY BYPASS GRAFTING X1,    STRESS TEST CARDIOLITE  2004    5 min, inferior ischemia, EF 60%    STRESS TEST CARDIOLITE  11    4 min, normal perfusion EF 50%         Family History:   Problem Relation Age of Onset    Cancer Mother     Diabetes Maternal Grandfather     Hypertension Maternal Grandfather     Other Brother         kathy cardiac    Diabetes Brother     Heart Disease Brother        Social History     Socioeconomic History    Marital status:      Spouse name: Not on file    Number of children: Not on file    Years of education: Not on file    Highest education level: Not on file   Occupational History    Not on file   Social Needs    Financial resource strain: Not on file    Food insecurity     Worry: Not on file     Inability: Not on file    Transportation needs     Medical: Not on file     Non-medical: Not on file   Tobacco Use    Smoking status: Former Smoker     Packs/day: 2.00     Years: 15.00     Pack years: 30.00     Quit date: 1981     Years since quittin.5    Smokeless tobacco: Never Used    Tobacco comment: quit > 30 years ago   Substance and Sexual Activity    Alcohol use: Yes     Comment: rare social     Drug use: No    Sexual activity: Not on file   Lifestyle    Physical activity     Days per week: Not on file     Minutes per session: Not on file    Stress: Not on file   Relationships    Social connections     Talks on phone: Not on file     Gets together: Not on file     Attends Yazidi service: Not on file     Active member of club or organization: Not on file     Attends meetings of clubs or organizations: Not on file     Relationship status: Not on file    Intimate partner violence     Fear of current or ex partner: Not on file     Emotionally abused: Not on file     Physically abused: Not on file     Forced sexual activity: Not on file   Other Topics Concern    Not on file   Social History Narrative    Not on file         ALLERGIES: Patient has no known allergies. Review of Systems   Constitutional: Negative for activity change, appetite change, chills, fatigue and fever. HENT: Negative for congestion, ear discharge, ear pain, sinus pressure, sinus pain, sore throat and trouble swallowing. Eyes: Negative for photophobia, pain, redness, itching and visual disturbance. Respiratory: Negative for chest tightness and shortness of breath. Cardiovascular: Negative for chest pain and palpitations. Gastrointestinal: Positive for abdominal pain and constipation. Negative for abdominal distention, nausea and vomiting. Endocrine: Negative. Genitourinary: Positive for difficulty urinating. Negative for frequency and urgency. Musculoskeletal: Positive for back pain. Negative for neck pain and neck stiffness. Skin: Negative for color change, pallor, rash and wound. Allergic/Immunologic: Negative. Neurological: Negative for dizziness, syncope, weakness and headaches. Hematological: Does not bruise/bleed easily. Psychiatric/Behavioral: Negative for behavioral problems. The patient is not nervous/anxious. Vitals:    05/15/21 0922   BP: (!) 105/57   Pulse: 69   Resp: 18   Temp: 98.5 °F (36.9 °C)   SpO2: 97%   Weight: 121.6 kg (268 lb)   Height: 6' 1\" (1.854 m)            Physical Exam  Vitals signs and nursing note reviewed. Constitutional:       General: He is not in acute distress. Appearance: Normal appearance. He is well-developed. He is not ill-appearing.    HENT:      Head: Normocephalic and atraumatic. Right Ear: External ear normal.      Left Ear: External ear normal.      Nose: Nose normal.      Mouth/Throat:      Mouth: Mucous membranes are moist.   Eyes:      General:         Right eye: No discharge. Left eye: No discharge. Conjunctiva/sclera: Conjunctivae normal.      Pupils: Pupils are equal, round, and reactive to light. Neck:      Musculoskeletal: Normal range of motion and neck supple. Vascular: No JVD. Trachea: No tracheal deviation. Cardiovascular:      Rate and Rhythm: Normal rate and regular rhythm. Pulses: Normal pulses. Heart sounds: Normal heart sounds. No murmur. No gallop. Pulmonary:      Effort: Pulmonary effort is normal. No respiratory distress. Breath sounds: Normal breath sounds. No wheezing or rales. Chest:      Chest wall: No tenderness. Abdominal:      General: Bowel sounds are normal. There is no distension. Palpations: Abdomen is soft. Tenderness: There is abdominal tenderness in the right lower quadrant and left lower quadrant. There is no guarding or rebound. Genitourinary:     Comments: Negative    Musculoskeletal: Normal range of motion. General: No tenderness. Skin:     General: Skin is warm and dry. Capillary Refill: Capillary refill takes less than 2 seconds. Coloration: Skin is not pale. Findings: No erythema or rash. Neurological:      General: No focal deficit present. Mental Status: He is alert and oriented to person, place, and time. Motor: No weakness. Coordination: Coordination normal.   Psychiatric:         Mood and Affect: Mood normal.         Behavior: Behavior normal.         Thought Content:  Thought content normal.         Judgment: Judgment normal.          MDM  Number of Diagnoses or Management Options  Enteritis: new and requires workup  Diagnosis management comments: Differential diagnosis includes kidney stone, urinary tract infection, musculoskeletal pain and others. After physical examination, review of laboratory data, review of imaging and discussion with Dr. Keyla Bryan, patient was discharged home and will follow up with PCP and GI as an outpatient. Cipro and Flagyl ordered. Return to the emergency room with worsening symptoms. Patient in agreement with plan of care. Amount and/or Complexity of Data Reviewed  Clinical lab tests: ordered and reviewed  Tests in the radiology section of CPT®: ordered and reviewed  Discuss the patient with other providers: yes (Dr. Keyla Bryan  )         [id number]: Reviewed laboratory data, awaiting CT scan. Labs Reviewed   CBC WITH AUTOMATED DIFF - Abnormal; Notable for the following components:       Result Value    HGB 10.1 (*)     HCT 32.4 (*)     MCV 60.6 (*)     MCH 18.9 (*)     RDW 18.0 (*)     All other components within normal limits   METABOLIC PANEL, COMPREHENSIVE - Abnormal; Notable for the following components:    Glucose 160 (*)     All other components within normal limits   URINALYSIS W/MICROSCOPIC - Abnormal; Notable for the following components:    Protein 30 (*)     All other components within normal limits   LIPASE   OCCULT BLOOD, STOOL   SAMPLES BEING HELD   SAMPLE TO BLOOD BANK     Ct Abd Pelv W Cont    Result Date: 5/15/2021  1. Nonspecific lindsay mesentery appearance with small associated lymph nodes may reflect mesenteric adenitis or reaction to enteritis. Follow-up can be considered. 2. Renal cysts with indeterminate density exophytic lesion from the lower pole right kidney for which nonemergent characterization with dedicated renal MRI or CT is recommended. 3. Cholecystolithiasis and additional incidentals as above. 11:57 AM  Pt has been reexamined. Pt has no new complaints, changes or physical findings. Care plan outlined and precautions discussed. All available results were reviewed with pt. All medications were reviewed with pt.  All of pt's questions and concerns were addressed. Pt agrees to F/U as instructed and agrees to return to ED upon further deterioration. Pt is ready to go home. Derik Buitrago NP    Please note that this dictation was completed with [employer], the computer voice recognition software. Quite often unanticipated grammatical, syntax, homophones, and other interpretive errors are inadvertently transcribed by the computer software. Please disregard these errors. Please excuse any errors that have escaped final proofreading. Thank you.     Procedures

## 2021-05-15 NOTE — ED NOTES
Discharge instructions explained to patient with the opportunity to answer questions. Patient left the ed ambulatory with a steady gait with discharge instructions in hand accompanied by wife.

## 2021-05-20 ENCOUNTER — OFFICE VISIT (OUTPATIENT)
Dept: INTERNAL MEDICINE CLINIC | Age: 76
End: 2021-05-20
Payer: MEDICARE

## 2021-05-20 VITALS
SYSTOLIC BLOOD PRESSURE: 100 MMHG | RESPIRATION RATE: 16 BRPM | BODY MASS INDEX: 31.68 KG/M2 | DIASTOLIC BLOOD PRESSURE: 66 MMHG | HEART RATE: 74 BPM | WEIGHT: 239 LBS | TEMPERATURE: 97.8 F | HEIGHT: 73 IN | OXYGEN SATURATION: 100 %

## 2021-05-20 DIAGNOSIS — N28.89 LEFT KIDNEY MASS: Primary | ICD-10-CM

## 2021-05-20 DIAGNOSIS — I25.10 CORONARY ARTERY DISEASE INVOLVING NATIVE CORONARY ARTERY OF NATIVE HEART WITHOUT ANGINA PECTORIS: ICD-10-CM

## 2021-05-20 DIAGNOSIS — K52.9 COLITIS: ICD-10-CM

## 2021-05-20 DIAGNOSIS — E11.9 TYPE 2 DIABETES MELLITUS WITHOUT COMPLICATION, WITHOUT LONG-TERM CURRENT USE OF INSULIN (HCC): ICD-10-CM

## 2021-05-20 DIAGNOSIS — I10 HTN (HYPERTENSION), BENIGN: ICD-10-CM

## 2021-05-20 LAB
CHOLEST SERPL-MCNC: 98 MG/DL
CREAT UR-MCNC: 229 MG/DL
EST. AVERAGE GLUCOSE BLD GHB EST-MCNC: 137 MG/DL
HBA1C MFR BLD: 6.4 % (ref 4–5.6)
HDLC SERPL-MCNC: 50 MG/DL
HDLC SERPL: 2 {RATIO} (ref 0–5)
LDLC SERPL CALC-MCNC: 35 MG/DL (ref 0–100)
MICROALBUMIN UR-MCNC: 14.9 MG/DL
MICROALBUMIN/CREAT UR-RTO: 65 MG/G (ref 0–30)
TRIGL SERPL-MCNC: 65 MG/DL (ref ?–150)
VLDLC SERPL CALC-MCNC: 13 MG/DL

## 2021-05-20 PROCEDURE — G8427 DOCREV CUR MEDS BY ELIG CLIN: HCPCS | Performed by: INTERNAL MEDICINE

## 2021-05-20 PROCEDURE — 1101F PT FALLS ASSESS-DOCD LE1/YR: CPT | Performed by: INTERNAL MEDICINE

## 2021-05-20 PROCEDURE — G8510 SCR DEP NEG, NO PLAN REQD: HCPCS | Performed by: INTERNAL MEDICINE

## 2021-05-20 PROCEDURE — 99205 OFFICE O/P NEW HI 60 MIN: CPT | Performed by: INTERNAL MEDICINE

## 2021-05-20 PROCEDURE — G8417 CALC BMI ABV UP PARAM F/U: HCPCS | Performed by: INTERNAL MEDICINE

## 2021-05-20 PROCEDURE — G8536 NO DOC ELDER MAL SCRN: HCPCS | Performed by: INTERNAL MEDICINE

## 2021-05-20 PROCEDURE — G8754 DIAS BP LESS 90: HCPCS | Performed by: INTERNAL MEDICINE

## 2021-05-20 PROCEDURE — G8752 SYS BP LESS 140: HCPCS | Performed by: INTERNAL MEDICINE

## 2021-05-20 PROCEDURE — G0463 HOSPITAL OUTPT CLINIC VISIT: HCPCS | Performed by: INTERNAL MEDICINE

## 2021-05-20 RX ORDER — CYCLOBENZAPRINE HCL 5 MG
5 TABLET ORAL AS NEEDED
COMMUNITY
End: 2021-08-20 | Stop reason: ALTCHOICE

## 2021-05-20 RX ORDER — MAGNESIUM CITRATE
296 SOLUTION, ORAL ORAL AS NEEDED
COMMUNITY
End: 2021-08-20 | Stop reason: ALTCHOICE

## 2021-05-20 RX ORDER — TESTOSTERONE 20.25 MG/1.25G
GEL TOPICAL DAILY
COMMUNITY
End: 2021-08-20 | Stop reason: ALTCHOICE

## 2021-05-20 RX ORDER — ACETAMINOPHEN 500 MG
TABLET ORAL DAILY
COMMUNITY

## 2021-05-20 NOTE — PROGRESS NOTES
HPI:  Newt Claude is a 68y.o. year old male who is here for a new patient appointment and transfer from a family medicine physician on [address] [place] [address]. He has a complicated history including diabetes, coronary disease, thalassemia minor, hypertension, hyperlipidemia, BPH, and and erectile dysfunction. He was seen in the emergency room 5 days ago with diffuse abdominal pain. CT scan revealed gallstones as well as? Colitis and enteritis. He has been treated with Cipro and Flagyl and his symptoms have gradually improved. He was also noted to have a? Cyst on his kidney. His pain is diffuse. It seems to be improved with a bowel movement. He denies any fevers or chills. No sweats. Some nausea but no vomiting. He has been eating and drinking well. His last lab work was done about 6 months ago with his family medicine doctor. He does have some chronic anemia felt to be related to thalassemia. Past Medical History:   Diagnosis Date    CAD (coronary artery disease) 01/23/2017    stent    DJD (degenerative joint disease) of knee 11/2013    left TKR    ED (erectile dysfunction)     Essential hypertension     Hyperlipidemia     [facility]2DM (type 2 diabetes mellitus) ([facility] [address]     Thalassemia minor        Past Surgical History:   Procedure Laterality Date    HX HEART CATHETERIZATION  2004    mild CAD, EF 60-65%    HX KNEE ARTHROSCOPY Left 11/2013    HX KNEE REPLACEMENT Left 2013    HX SHOULDER REPLACEMENT Right 1968    HX TURP  02/20/2019    [place] CABG, ARTERY-VEIN, SINGLE  01/19/2017    DAVINCI SABINE TAKEDOWN, LEFT ANTERIOR THORACOTOMY, OFF PUMP CORONARY ARTERY BYPASS GRAFTING X1,    STRESS TEST CARDIOLITE  5/14/2004    5 min, inferior ischemia, EF 60%    STRESS TEST CARDIOLITE  6/30/11    4 min, normal perfusion EF 50%       Prior to Admission medications    Medication Sig Start Date End Date Taking? Authorizing Provider   testosterone (ANDROGEL) 20.25 mg/1.25 gram (1.62 %) gel by TransDERmal route daily. Yes Provider, Historical   folic acid/multivit-min/lutein (CENTRUM SILVER PO) Take  by mouth. Yes Provider, Historical   cholecalciferol (VITAMIN D3) (2,000 UNITS /50 MCG) cap capsule Take  by mouth daily. Yes Provider, Historical   CALCIUM CARBONATE PO Take  by mouth as needed. Yes Provider, Historical   cyclobenzaprine (FLEXERIL) 5 mg tablet Take 5 mg by mouth as needed for Muscle Spasm(s). Yes Provider, Historical   magnesium citrate solution Take 296 mL by mouth as needed. Yes Provider, Historical   docusate sodium (STOOL SOFTENER PO) Take  by mouth as needed. Yes Provider, Historical   MELATONIN PO Take  by mouth as needed. Yes Provider, Historical   LICORICE ROOT PO Take  by mouth as needed. Yes Provider, Historical   glipiZIDE (GLUCOTROL) 5 mg tablet Take 5 mg by mouth daily. Yes Other, MD Evelia   ciprofloxacin HCl (Cipro) 500 mg tablet Take 1 Tab by mouth two (2) times a day for 7 days. 5/15/21 5/22/21 Yes Angela Schreiber NP   metroNIDAZOLE (FlagyL) 500 mg tablet Take 1 Tab by mouth two (2) times a day for 7 days. 5/15/21 5/22/21 Yes Angela Schreiber NP   tadalafiL (Cialis) 5 mg tablet Take 5 mg by mouth. Yes Provider, Historical   rosuvastatin (CRESTOR) 10 mg tablet Take 10 mg by mouth daily. Yes Provider, Historical   metFORMIN ER (GLUCOPHAGE XR) 500 mg tablet Take 1,000 mg by mouth daily. Yes Provider, Historical   lisinopril-hydroCHLOROthiazide (PRINZIDE, ZESTORETIC) 20-25 mg per tablet Take 1 Tab by mouth daily. Yes Provider, Historical   escitalopram oxalate (Lexapro) 20 mg tablet Take 20 mg by mouth daily. Yes Provider, Historical   acetaminophen (TYLENOL EXTRA STRENGTH) 500 mg tablet Take 1,000 mg by mouth every six (6) hours as needed for Pain. Yes Provider, Historical   amLODIPine (NORVASC) 10 mg tablet Take 10 mg by mouth daily. Yes Provider, Historical   aspirin delayed-release 81 mg tablet Take 81 mg by mouth daily.    Yes Provider, Historical linagliptin-metFORMIN (JENTADUETO) 2.5-1,000 mg per tablet Take 1 Tab by mouth two (2) times daily (with meals). Provider, Historical       Social History     Socioeconomic History    Marital status:      Spouse name: Not on file    Number of children: Not on file    Years of education: Not on file    Highest education level: Not on file   Occupational History    Not on file   Tobacco Use    Smoking status: Former Smoker     Packs/day: 2.00     Years: 15.00     Pack years: 30.00     Quit date: 1981     Years since quittin.5    Smokeless tobacco: Never Used    Tobacco comment: quit > 30 years ago   Substance and Sexual Activity    Alcohol use: Yes     Comment: 1-2 per month    Drug use: No    Sexual activity: Not on file   Other Topics Concern    Not on file   Social History Narrative    Not on file     Social Determinants of Health     Financial Resource Strain:     Difficulty of Paying Living Expenses:    Food Insecurity:     Worried About [address] in the Last Year:     [address] [employer] in the Last Year:    Transportation Needs:     Lack of Transportation (Medical):      Lack of Transportation (Non-Medical):    Physical Activity:     Days of Exercise per Week:     Minutes of Exercise per Session:    Stress:     Feeling of Stress :    Social Connections:     Frequency of Communication with Friends and Family:     Frequency of Social Gatherings with Friends and Family:     Attends Moravian Services:     Active Member of Clubs or Organizations:     Attends Club or Organization Meetings:     Marital Status:    Intimate Partner Violence:     Fear of Current or Ex-Partner:     Emotionally Abused:     Physically Abused:     Sexually Abused:           ROS  Per HPI    Visit Vitals  /66   Pulse 74   Temp 97.8 °F (36.6 °C) (Temporal)   Resp 16   Ht 6' 1\" (1.854 m)   Wt 239 lb (108.4 kg)   SpO2 100%   BMI 31.53 kg/m²         Physical Exam   Physical Examination: General appearance - alert, well appearing, and in no distress  Ears - bilateral TM's and external ear canals normal  Mouth - mucous membranes moist, pharynx normal without lesions  Neck - supple, no significant adenopathy  Lymphatics - no palpable lymphadenopathy, no hepatosplenomegaly  Chest - clear to auscultation, no wheezes, rales or rhonchi, symmetric air entry  Heart - normal rate and regular rhythm  Abdomen -active bowel sounds. Soft with mild diffuse tenderness. No rebound or guarding. No masses. Musculoskeletal - no joint tenderness, deformity or swelling  Extremities - peripheral pulses normal, no pedal edema, no clubbing or cyanosis      Assessment/Plan:  Diagnoses and all orders for this visit:    1. Left kidney mass-we will defer to urology. I did text his urologist who will review the scan and reach out to him. -     REFERRAL TO UROLOGY    2. Colitis-I reached out to Dr. Ele Robison he is not sure who did his last colonoscopy but he thinks it was done within the last 5 years. They will call the patient to arrange an appointment and arrange the colonoscopy. He will complete his antibiotics. He was instructed to call with fevers, chills, sweats, vomiting, or bleeding.  -     REFERRAL TO GASTROENTEROLOGY    3. Type 2 diabetes mellitus without complication, without long-term current use of insulin (HCC)-lab work for 6-month follow-up to be done today.  -     HEMOGLOBIN A1C WITH EAG; Future  -     MICROALBUMIN, UR, RAND W/ MICROALB/CREAT RATIO; Future  -     LIPID PANEL; Future    4. HTN (hypertension), benign-blood pressure well controlled on current meds. 5. Coronary artery disease involving native coronary artery of native heart without angina pectoris-LDL goal of 100. Will check labs for that and tolerating statins.               Advised him to call back or return to office if symptoms worsen/change/persist.  Discussed expected course/resolution/complications of diagnosis in detail with patient. Medication risks/benefits/costs/interactions/alternatives discussed with patient. He was given an after visit summary which includes diagnoses, current medications, & vitals. He expressed understanding with the diagnosis and plan.

## 2021-05-28 ENCOUNTER — HOSPITAL ENCOUNTER (OUTPATIENT)
Dept: PREADMISSION TESTING | Age: 76
Discharge: HOME | End: 2021-05-28
Payer: MEDICARE

## 2021-05-28 ENCOUNTER — TRANSCRIBE ORDER (OUTPATIENT)
Dept: REGISTRATION | Age: 76
End: 2021-05-28

## 2021-05-28 DIAGNOSIS — Z01.812 PRE-PROCEDURE LAB EXAM: ICD-10-CM

## 2021-05-28 DIAGNOSIS — Z01.812 PRE-PROCEDURE LAB EXAM: Primary | ICD-10-CM

## 2021-05-28 PROCEDURE — U0003 INFECTIOUS AGENT DETECTION BY NUCLEIC ACID (DNA OR RNA); SEVERE ACUTE RESPIRATORY SYNDROME CORONAVIRUS 2 (SARS-COV-2) (CORONAVIRUS DISEASE [COVID-19]), AMPLIFIED PROBE TECHNIQUE, MAKING USE OF HIGH THROUGHPUT TECHNOLOGIES AS DESCRIBED BY CMS-2020-01-R: HCPCS

## 2021-05-29 LAB — SARS-COV-2, COV2NT: NOT DETECTED

## 2021-06-01 ENCOUNTER — ANESTHESIA EVENT (OUTPATIENT)
Dept: ENDOSCOPY | Age: 76
End: 2021-06-01
Payer: MEDICARE

## 2021-06-01 ENCOUNTER — HOSPITAL ENCOUNTER (OUTPATIENT)
Age: 76
Setting detail: OUTPATIENT SURGERY
Discharge: HOME | End: 2021-06-01
Attending: INTERNAL MEDICINE | Admitting: INTERNAL MEDICINE
Payer: MEDICARE

## 2021-06-01 ENCOUNTER — ANESTHESIA (OUTPATIENT)
Dept: ENDOSCOPY | Age: 76
End: 2021-06-01
Payer: MEDICARE

## 2021-06-01 VITALS
HEART RATE: 60 BPM | DIASTOLIC BLOOD PRESSURE: 54 MMHG | SYSTOLIC BLOOD PRESSURE: 120 MMHG | BODY MASS INDEX: 30.93 KG/M2 | TEMPERATURE: 98.9 F | RESPIRATION RATE: 18 BRPM | OXYGEN SATURATION: 99 % | HEIGHT: 73 IN | WEIGHT: 233.4 LBS

## 2021-06-01 LAB
GLUCOSE BLD STRIP.AUTO-MCNC: 134 MG/DL (ref 65–117)
SERVICE CMNT-IMP: ABNORMAL

## 2021-06-01 PROCEDURE — 77030021593 HC FCPS BIOP ENDOSC BSC -A: Performed by: INTERNAL MEDICINE

## 2021-06-01 PROCEDURE — 74011250637 HC RX REV CODE- 250/637: Performed by: INTERNAL MEDICINE

## 2021-06-01 PROCEDURE — 82962 GLUCOSE BLOOD TEST: CPT

## 2021-06-01 PROCEDURE — 74011000250 HC RX REV CODE- 250: Performed by: NURSE ANESTHETIST, CERTIFIED REGISTERED

## 2021-06-01 PROCEDURE — 77030029384 HC SNR POLYP CAPTVR II BSC -B: Performed by: INTERNAL MEDICINE

## 2021-06-01 PROCEDURE — 88305 TISSUE EXAM BY PATHOLOGIST: CPT

## 2021-06-01 PROCEDURE — 76060000032 HC ANESTHESIA 0.5 TO 1 HR: Performed by: INTERNAL MEDICINE

## 2021-06-01 PROCEDURE — 76040000007: Performed by: INTERNAL MEDICINE

## 2021-06-01 PROCEDURE — 2709999900 HC NON-CHARGEABLE SUPPLY: Performed by: INTERNAL MEDICINE

## 2021-06-01 PROCEDURE — 74011250636 HC RX REV CODE- 250/636: Performed by: NURSE ANESTHETIST, CERTIFIED REGISTERED

## 2021-06-01 RX ORDER — SODIUM CHLORIDE 9 MG/ML
150 INJECTION, SOLUTION INTRAVENOUS CONTINUOUS
Status: DISCONTINUED | OUTPATIENT
Start: 2021-06-01 | End: 2021-06-01 | Stop reason: HOSPADM

## 2021-06-01 RX ORDER — SODIUM CHLORIDE 0.9 % (FLUSH) 0.9 %
5-40 SYRINGE (ML) INJECTION AS NEEDED
Status: DISCONTINUED | OUTPATIENT
Start: 2021-06-01 | End: 2021-06-01 | Stop reason: HOSPADM

## 2021-06-01 RX ORDER — SODIUM CHLORIDE 9 MG/ML
INJECTION, SOLUTION INTRAVENOUS
Status: DISCONTINUED | OUTPATIENT
Start: 2021-06-01 | End: 2021-06-01 | Stop reason: HOSPADM

## 2021-06-01 RX ORDER — PROPOFOL 10 MG/ML
INJECTION, EMULSION INTRAVENOUS AS NEEDED
Status: DISCONTINUED | OUTPATIENT
Start: 2021-06-01 | End: 2021-06-01 | Stop reason: HOSPADM

## 2021-06-01 RX ORDER — EPINEPHRINE 0.1 MG/ML
1 INJECTION INTRACARDIAC; INTRAVENOUS
Status: DISCONTINUED | OUTPATIENT
Start: 2021-06-01 | End: 2021-06-01 | Stop reason: HOSPADM

## 2021-06-01 RX ORDER — MIDAZOLAM HYDROCHLORIDE 1 MG/ML
.25-5 INJECTION, SOLUTION INTRAMUSCULAR; INTRAVENOUS
Status: DISCONTINUED | OUTPATIENT
Start: 2021-06-01 | End: 2021-06-01 | Stop reason: HOSPADM

## 2021-06-01 RX ORDER — LIDOCAINE HYDROCHLORIDE 20 MG/ML
INJECTION, SOLUTION EPIDURAL; INFILTRATION; INTRACAUDAL; PERINEURAL AS NEEDED
Status: DISCONTINUED | OUTPATIENT
Start: 2021-06-01 | End: 2021-06-01 | Stop reason: HOSPADM

## 2021-06-01 RX ORDER — SODIUM CHLORIDE 0.9 % (FLUSH) 0.9 %
5-40 SYRINGE (ML) INJECTION EVERY 8 HOURS
Status: DISCONTINUED | OUTPATIENT
Start: 2021-06-01 | End: 2021-06-01 | Stop reason: HOSPADM

## 2021-06-01 RX ORDER — ATROPINE SULFATE 0.1 MG/ML
0.5 INJECTION INTRAVENOUS
Status: DISCONTINUED | OUTPATIENT
Start: 2021-06-01 | End: 2021-06-01 | Stop reason: HOSPADM

## 2021-06-01 RX ORDER — DEXTROMETHORPHAN/PSEUDOEPHED 2.5-7.5/.8
1.2 DROPS ORAL
Status: DISCONTINUED | OUTPATIENT
Start: 2021-06-01 | End: 2021-06-01 | Stop reason: HOSPADM

## 2021-06-01 RX ORDER — PHENYLEPHRINE HCL IN 0.9% NACL 0.4MG/10ML
SYRINGE (ML) INTRAVENOUS AS NEEDED
Status: DISCONTINUED | OUTPATIENT
Start: 2021-06-01 | End: 2021-06-01 | Stop reason: HOSPADM

## 2021-06-01 RX ORDER — FENTANYL CITRATE 50 UG/ML
200 INJECTION, SOLUTION INTRAMUSCULAR; INTRAVENOUS
Status: DISCONTINUED | OUTPATIENT
Start: 2021-06-01 | End: 2021-06-01 | Stop reason: HOSPADM

## 2021-06-01 RX ADMIN — PROPOFOL 30 MG: 10 INJECTION, EMULSION INTRAVENOUS at 10:31

## 2021-06-01 RX ADMIN — LIDOCAINE HYDROCHLORIDE 40 MG: 20 INJECTION, SOLUTION EPIDURAL; INFILTRATION; INTRACAUDAL; PERINEURAL at 10:30

## 2021-06-01 RX ADMIN — PROPOFOL 20 MG: 10 INJECTION, EMULSION INTRAVENOUS at 10:42

## 2021-06-01 RX ADMIN — PROPOFOL 30 MG: 10 INJECTION, EMULSION INTRAVENOUS at 10:30

## 2021-06-01 RX ADMIN — PROPOFOL 20 MG: 10 INJECTION, EMULSION INTRAVENOUS at 10:46

## 2021-06-01 RX ADMIN — Medication 80 MG: at 10:46

## 2021-06-01 RX ADMIN — PROPOFOL 20 MG: 10 INJECTION, EMULSION INTRAVENOUS at 10:51

## 2021-06-01 RX ADMIN — PROPOFOL 30 MG: 10 INJECTION, EMULSION INTRAVENOUS at 10:33

## 2021-06-01 RX ADMIN — PROPOFOL 30 MG: 10 INJECTION, EMULSION INTRAVENOUS at 10:36

## 2021-06-01 RX ADMIN — PROPOFOL 30 MG: 10 INJECTION, EMULSION INTRAVENOUS at 10:40

## 2021-06-01 RX ADMIN — SODIUM CHLORIDE: 900 INJECTION, SOLUTION INTRAVENOUS at 10:22

## 2021-06-01 RX ADMIN — Medication 80 MCG: at 10:32

## 2021-06-01 RX ADMIN — Medication 40 MCG: at 10:39

## 2021-06-01 RX ADMIN — PROPOFOL 30 MG: 10 INJECTION, EMULSION INTRAVENOUS at 10:32

## 2021-06-01 NOTE — PROCEDURES
Kerri Leon [address] Sherren Schmid, M.D.  [address] [facility], [address] [facility]  [phone]               Esophagogastroduodenoscopy (EGD) Procedure Note    NAME: David Mccann  :  1945  MRN:  [id number]    Indications:  Melena     : Christelle Kamara MD    Referring Provider:  Bassem Arteaga MD    Staff: Endoscopy Elis Jain: Edy Roche C  Endoscopy RN-1: Gavin Montenegro RN    Prosthetic devices, grafts, tissues, transplant, or devices implanted: none    Medicine:  MAC anesthesia      Procedure Details:  After informed consent was obtained with all risks and benefits of the procedure explained and preprocedure exam completed, the patient was placed in the left lateral decubitus position. Universal protocol for patient identification was performed and documented in the nursing notes. Throughout the procedure, the patient's blood pressure was monitored at least every five minutes; pulse, and oxygen saturations were monitored continuously. All vital signs were documented in the nursing notes. The endoscope was inserted into the mouth and advanced under direct vision to second portion of the duodenum. A careful inspection was made as the gastroscope was withdrawn, including a retroflexed view of the proximal stomach; findings and interventions are described below.       Findings:   Esophagus: LA Grade A reflux esophagitis s/p biopsies  Stomach: 3 cm hiatal hernia, 2 mm sessile polyp in the fundus s/p cold forceps polypectomy  Duodenum: normal    Interventions:    biopsy of esophagus and stomach    Specimens:   ID Type Source Tests Collected by Time Destination   1 : Gastric Polyp  Preservative Gastric  Felix Ayoub MD 2021 1035 Pathology   2 : GE Junction Biopsies Preservative GE Junction  Felix Ayoub MD 2021 1037 Pathology   3 : Cecal Polyp Preservative Cecum  Felix Ayoub MD 2021 1047 Pathology   4 : Ascending Colon Polyp Preservative Colon, Ascending  Delmy River MD 6/1/2021 1050 Pathology        EBL: None          Complications:     No immediate complications        Impression:  -See post-procedure diagnoses. Recommendations:  -Await pathology. Signed by:  Les Shah MD         6/1/2021 11:06 AM

## 2021-06-01 NOTE — H&P
[address]  174 [facility], [address] [place] [address]          Pre-procedure History and Physical       NAME:  Libertad Kapoor   :   1945   MRN:   [id number]     CHIEF COMPLAINT/HPI: melena, colon polyps    PMH:  Past Medical History:   Diagnosis Date    CAD (coronary artery disease) 2017    stent    DJD (degenerative joint disease) of knee 2013    left TKR    ED (erectile dysfunction)     Essential hypertension     Hyperlipidemia     T2DM (type 2 diabetes mellitus) ([facility] [address]     Thalassemia minor        PSH:  Past Surgical History:   Procedure Laterality Date    HX HEART CATHETERIZATION      mild CAD, EF 60-65%    HX KNEE ARTHROSCOPY Left 2013    HX KNEE REPLACEMENT Left     HX SHOULDER REPLACEMENT Right     HX TURP  2019    [place] CABG, ARTERY-VEIN, SINGLE  2017    DAVINCI SABINE TAKEDOWN, LEFT ANTERIOR THORACOTOMY, OFF PUMP CORONARY ARTERY BYPASS GRAFTING X1,    STRESS TEST CARDIOLITE  2004    5 min, inferior ischemia, EF 60%    STRESS TEST CARDIOLITE  11    4 min, normal perfusion EF 50%       Allergies:  No Known Allergies    Home Medications:  Prior to Admission Medications   Prescriptions Last Dose Informant Patient Reported? Taking? CALCIUM CARBONATE PO 2021  Yes No   Sig: Take  by mouth as needed. acetaminophen (TYLENOL EXTRA STRENGTH) 500 mg tablet Unknown at Unknown time  Yes No   Sig: Take 1,000 mg by mouth every six (6) hours as needed for Pain. amLODIPine (NORVASC) 10 mg tablet 2021 at Unknown time  Yes Yes   Sig: Take 10 mg by mouth daily. aspirin delayed-release 81 mg tablet 2021 at Unknown time  Yes Yes   Sig: Take 81 mg by mouth daily. cholecalciferol (VITAMIN D3) (2,000 UNITS /50 MCG) cap capsule 2021  Yes No   Sig: Take  by mouth daily. cyclobenzaprine (FLEXERIL) 5 mg tablet 2021  Yes No   Sig: Take 5 mg by mouth as needed for Muscle Spasm(s).    docusate sodium (STOOL SOFTENER PO) 5/18/2021  Yes No   Sig: Take  by mouth as needed. escitalopram oxalate (Lexapro) 20 mg tablet 5/31/2021 at Unknown time  Yes Yes   Sig: Take 20 mg by mouth daily. folic acid/multivit-min/lutein (CENTRUM SILVER PO) 5/29/2021  Yes No   Sig: Take  by mouth. glipiZIDE (GLUCOTROL) 5 mg tablet 5/31/2021 at Unknown time  Yes Yes   Sig: Take 5 mg by mouth daily. lisinopril-hydroCHLOROthiazide (PRINZIDE, ZESTORETIC) 20-25 mg per tablet 5/31/2021 at Unknown time  Yes Yes   Sig: Take 1 Tab by mouth daily. magnesium citrate solution 5/18/2021  Yes No   Sig: Take 296 mL by mouth as needed. metFORMIN ER (GLUCOPHAGE XR) 500 mg tablet 5/31/2021 at Unknown time  Yes Yes   Sig: Take 1,000 mg by mouth daily. rosuvastatin (CRESTOR) 10 mg tablet 5/31/2021 at Unknown time  Yes Yes   Sig: Take 10 mg by mouth daily. tadalafiL (Cialis) 5 mg tablet 5/31/2021 at Unknown time  Yes Yes   Sig: Take 5 mg by mouth. testosterone (ANDROGEL) 20.25 mg/1.25 gram (1.62 %) gel 5/30/2021  Yes No   Sig: by TransDERmal route daily.       Facility-Administered Medications: None       Hospital Medications:  Current Facility-Administered Medications   Medication Dose Route Frequency    0.9% sodium chloride infusion  150 mL/hr IntraVENous CONTINUOUS    sodium chloride (NS) flush 5-40 mL  5-40 mL IntraVENous Q8H    sodium chloride (NS) flush 5-40 mL  5-40 mL IntraVENous PRN    midazolam (VERSED) injection 0.25-5 mg  0.25-5 mg IntraVENous Multiple    fentaNYL citrate (PF) injection 200 mcg  200 mcg IntraVENous Multiple    simethicone (MYLICON) 00ZF/1.7JT oral drops 80 mg  1.2 mL Oral Multiple    atropine injection 0.5 mg  0.5 mg IntraVENous ONCE PRN    EPINEPHrine (ADRENALIN) 0.1 mg/mL syringe 1 mg  1 mg Endoscopically ONCE PRN     Facility-Administered Medications Ordered in Other Encounters   Medication Dose Route Frequency    0.9% sodium chloride infusion   IntraVENous CONTINUOUS       Family History:  Family History   Problem Relation Age of Onset    Cancer Mother         breast    Diabetes Maternal Grandfather     Hypertension Maternal Grandfather     Other Brother         kathy cardiac    Diabetes Brother     Heart Disease Brother        Social History:  Social History     Tobacco Use    Smoking status: Former Smoker     Packs/day: 2.00     Years: 15.00     Pack years: 30.00     Quit date: 1981     Years since quittin.6    Smokeless tobacco: Never Used    Tobacco comment: quit > 30 years ago   Substance Use Topics    Alcohol use: Yes     Comment: 1-2 per month       The patient was counseled at length about the risks of obey Covid-19 in the ami-operative and post-operative states including the recovery window of their procedure. The patient was made aware that obey Covid-19 after a surgical procedure may worsen their prognosis for recovering from the virus and lend to a higher morbidity and or mortality risk. The patient was given the options of postponing their procedure. All of the risks, benefits, and alternatives were discussed. The patient does  wish to proceed with the procedure. PHYSICAL EXAM PRIOR TO SEDATION:  General: Alert, in no acute distress    Lungs:            CTA bilaterally  Heart:  Normal S1, S2    Abdomen: Soft, Non distended, Non tender. Normoactive bowel sounds. Assessment:   Stable for sedation administration.   Date of last colonoscopy: , Polyps  Yes    Plan:     · Endoscopic procedure with sedation     Signed By: Aditya Danielle MD     2021  10:29 AM

## 2021-06-01 NOTE — ROUTINE PROCESS
Nelly Dominguez 1945 
[id number] Situation: 
Verbal report received from:  
Procedure: Procedure(s): 
COLONOSCOPY,EGD   :- 
. 
ESOPHAGOGASTRODUODENAL (EGD) BIOPSY ENDOSCOPIC POLYPECTOMY Background: 
 
Preoperative diagnosis: MELENA, LOWER LEFT QUAD PAIN Postoperative diagnosis: 1. - Hiatal Hernia 2. - Reflux Esophagitis 3. - Gastric Polyp 4. - Cecal Polyp and ascending colon polyp 5. - Cecal AVM's 
6. - Diverticulosis 7. - Hemorrhoids :  Dr. Jamia Santamaria Assistant(s): Endoscopy Technician-1: Eliaan Mendez Endoscopy RN-1: Jamari Hackett RN Specimens:  
ID Type Source Tests Collected by Time Destination 1 : Gastric Polyp  Preservative Gastric  Sherice Vora MD 6/1/2021 1035 Pathology 2 : [address] Preservative GE Junction  Sherice Vora MD 6/1/2021 1037 Pathology 3 : Cecal Polyp Preservative Cecum  Sherice Vora MD 6/1/2021 1047 Pathology 4 : Ascending Colon Polyp Preservative Colon, Ascending  Sherice Vora MD 6/1/2021 1050 Pathology H. Pylori  no Assessment: 
Intra-procedure medications Anesthesia gave intra-procedure sedation and medications, see anesthesia flow sheet yes Intravenous fluids: NS@ [facility] Vital signs stable Abdominal assessment: round and soft Recommendation: 
Discharge patient per MD order. Family or Friend Permission to share finding with family or friend yes

## 2021-06-01 NOTE — PROGRESS NOTES

## 2021-06-01 NOTE — DISCHARGE INSTRUCTIONS
[facility] [address] Patient, M.D.  174 [facility], [facility] [place][note 2] Trudy Rod   [phone]                      EGD/COLONOSCOPY DISCHARGE INSTRUCTIONS    Medardo Menon  [id number]  1945    DISCOMFORT:  Redness at IV site- apply warm compress to area; if redness or soreness persist- contact your physician  There may be a slight amount of blood passed from the rectum  Gaseous discomfort- walking, belching will help relieve any discomfort  You may not operate a vehicle for 12 hours  You may not  engage in an occupation involving machinery or appliances for rest of today  You may not  drink alcoholic beverages for at least 12 hours  Avoid making any critical decisions for at least 24 hour    DIET:   You may resume your normal diet, but some patients find that heavy or large  meals may lead to indigestion or vomiting. I suggest a light meal as first food  Intake. I recommend a whole food, plant-based diet for your overall health. ACTIVITY:  You may resume your normal daily activities. It is recommended that you spend the remainder of the day resting - avoid any strenuous activity. CALL M.D. ANY SIGN OF   Increasing pain, nausea, vomiting  Abdominal distension (swelling)  New increased bleeding (oral or rectal)  Fever (chills)  Pain in chest area  Bloody discharge from nose or mouth  Shortness of breath    Additional Instructions:   Call Dr. Yeh Patient if any questions or problems at [phone]   You should receive the biopsy results by phone or mail within 3 weeks, if not, call my office for the results. Should have a repeat colonoscopy in 5 years if benefits outweigh risks. EGD showed 3 cm hiatal hernia, small stomach polyp, and mild acid reflux. Colonoscopy showed 2 polyps removed, diverticulosis, colon AVMs, and large internal hemorrhoids. Do not read or use the cellphone on the toilet to help with hemorrhoids.           Learning About Coronavirus (COVID-19)  Coronavirus (COVID-19): Overview  What is coronavirus ([id number])? The coronavirus disease (COVID-19) is caused by a virus. It is an illness that was first [facility] December 2019. It has since spread worldwide. The virus can cause fever, cough, and trouble breathing. In severe cases, it can cause pneumonia and make it hard to breathe without help. It can cause death. Coronaviruses are a large group of viruses. They cause the common cold. They also cause more serious illnesses like Middle East respiratory syndrome (MERS) and severe acute respiratory syndrome (SARS). COVID-19 is caused by a novel coronavirus. That means it's a new type that has not been seen in people before. This virus spreads person-to-person through droplets from coughing and sneezing. It can also spread when you are close to someone who is infected. And it can spread when you touch something that has the virus on it, such as a doorknob or a tabletop. What can you do to protect yourself from coronavirus (COVID-19)? The best way to protect yourself from getting sick is to:  · Avoid areas where there is an outbreak. · Avoid contact with people who may be infected. · Wash your hands often with soap or alcohol-based hand sanitizers. · Avoid crowds and try to stay at least 6 feet away from other people. · Wash your hands often, especially after you cough or sneeze. Use soap and water, and scrub for at least 20 seconds. If soap and water aren't available, use an alcohol-based hand . · Avoid touching your mouth, nose, and eyes. What can you do to avoid spreading the virus to others? To help avoid spreading the virus to others:  · Cover your mouth with a tissue when you cough or sneeze. Then throw the tissue in the trash. · Use a disinfectant to clean things that you touch often. · Stay home if you are sick or have been exposed to the virus. Don't go to school, work, or public areas. And don't use public transportation.   · If you are sick:  ? Leave your home only if you need to get medical care. But call the doctor's office first so they know you're coming. And wear a face mask, if you have one.  ? If you have a face mask, wear it whenever you're around other people. It can help stop the spread of the virus when you cough or sneeze. ? Clean and disinfect your home every day. Use household  and disinfectant wipes or sprays. Take special care to clean things that you grab with your hands. These include doorknobs, remote controls, phones, and handles on your refrigerator and microwave. And don't forget countertops, tabletops, bathrooms, and computer keyboards. When to call for help  Call 911 anytime you think you may need emergency care. For example, call if:  · You have severe trouble breathing. (You can't talk at all.)  · You have constant chest pain or pressure. · You are severely dizzy or lightheaded. · You are confused or can't think clearly. · Your face and lips have a blue color. · You pass out (lose consciousness) or are very hard to wake up. Call your doctor now if you develop symptoms such as:  · Shortness of breath. · Fever. · Cough. If you need to get care, call ahead to the doctor's office for instructions before you go. Make sure you wear a face mask, if you have one, to prevent exposing other people to the virus. Where can you get the latest information? The following health organizations are tracking and studying this virus. Their websites contain the most up-to-date information. Bruce Angel also learn what to do if you think you may have been exposed to the virus. · U.S. Centers for Disease Control and Prevention (CDC): The CDC provides updated news about the disease and travel advice. The website also tells you how to prevent the spread of infection. www.cdc.gov  · World Health Organization [facility]): WHO offers information about the virus outbreaks.  WHO also has travel advice. www.who.int  Current as of: April 1, 2020               Content Version: 12.4  © 2006-2020 [employer]. Care instructions adapted under license by your healthcare professional. If you have questions about a medical condition or this instruction, always ask your healthcare professional. [address] any warranty or liability for your use of this information. Patient Education        Diverticulosis: Care Instructions  Your Care Instructions  In diverticulosis, pouches called diverticula form in the wall of the large intestine (colon). The pouches do not cause any pain or other symptoms. Most people who have diverticulosis do not know they have it. But the pouches sometimes bleed, and if they become infected, they can cause pain and other symptoms. When this happens, it is called diverticulitis. Diverticula form when pressure pushes the wall of the colon outward at certain weak points. A diet that is too low in fiber can cause diverticula. Follow-up care is a key part of your treatment and safety. Be sure to make and go to all appointments, and call your doctor if you are having problems. It's also a good idea to know your test results and keep a list of the medicines you take. How can you care for yourself at home? · Include fruits, leafy green vegetables, beans, and whole grains in your diet each day. These foods are high in fiber. · Take a fiber supplement, such as Citrucel or Metamucil, every day if needed. Read and follow all instructions on the label. · Drink plenty of fluids. If you have kidney, heart, or liver disease and have to limit fluids, talk with your doctor before you increase the amount of fluids you drink. · Get at least 30 minutes of exercise on most days of the week. Walking is a good choice. You also may want to do other activities, such as running, swimming, cycling, or playing tennis or team sports. · Cut out foods that cause gas, pain, or other symptoms.   When should you call for help?   Call your doctor now or seek immediate medical care if:    · You have belly pain.     · You pass maroon or very bloody stools.     · You have a fever.     · You have nausea and vomiting.     · You have unusual changes in your bowel movements or abdominal swelling.     · You have burning pain when you urinate.     · You have abnormal vaginal discharge.     · You have shoulder pain.     · You have cramping pain that does not get better when you have a bowel movement or pass gas.     · You pass gas or stool from your urethra while urinating. Watch closely for changes in your health, and be sure to contact your doctor if you have any problems. Where can you learn more? Go to http://www.gray.com/  Enter [id number] in the search box to learn more about \"Diverticulosis: Care Instructions. \"  Current as of: April 15, 2020               Content Version: 12.8  © 0039-8359 [employer]. Care instructions adapted under license by [employer] (which disclaims liability or warranty for this information). If you have questions about a medical condition or this instruction, always ask your healthcare professional. [address] any warranty or liability for your use of this information.

## 2021-06-01 NOTE — ANESTHESIA PREPROCEDURE EVALUATION
Relevant Problems   No relevant active problems       Anesthetic History   No history of anesthetic complications            Review of Systems / Medical History  Patient summary reviewed, nursing notes reviewed and pertinent labs reviewed    Pulmonary  Within defined limits                 Neuro/Psych       CVA  TIA     Cardiovascular    Hypertension: well controlled          CAD    Exercise tolerance: >4 METS     GI/Hepatic/Renal                Endo/Other    Diabetes    Arthritis     Other Findings              Physical Exam    Airway  Mallampati: I  TM Distance: > 6 cm  Neck ROM: normal range of motion   Mouth opening: Normal     Cardiovascular    Rhythm: regular  Rate: normal         Dental  No notable dental hx       Pulmonary  Breath sounds clear to auscultation               Abdominal         Other Findings            Anesthetic Plan    ASA: 3  Anesthesia type: MAC          Induction: Intravenous  Anesthetic plan and risks discussed with: Patient

## 2021-06-01 NOTE — ANESTHESIA POSTPROCEDURE EVALUATION
Post-Anesthesia Evaluation and Assessment    Patient: Charity Samson MRN: [id number]  SSN: [id number]    YOB: 1945  Age: 68 y.o. Sex: male      I have evaluated the patient and they are stable and ready for discharge from the PACU. Cardiovascular Function/Vital Signs  Visit Vitals  /69   Pulse (!) 52   Temp 36.8 °C (98.2 °F)   Resp 14   Ht 6' 1\" (1.854 m)   Wt 105.9 kg (233 lb 6.4 oz)   SpO2 100%   BMI 30.79 kg/m²       Patient is status post MAC anesthesia for Procedure(s):  COLONOSCOPY,EGD   :-  .  ESOPHAGOGASTRODUODENAL (EGD) BIOPSY  ENDOSCOPIC POLYPECTOMY. Nausea/Vomiting: None    Postoperative hydration reviewed and adequate. Pain:  Pain Scale 1: Numeric (0 - 10) (06/01/21 0942)  Pain Intensity 1: 1 (06/01/21 7175)   Managed    Neurological Status: At baseline    Mental Status, Level of Consciousness: Alert and  oriented to person, place, and time    Pulmonary Status:   O2 Device: Nasal cannula (06/01/21 1057)   Adequate oxygenation and airway patent    Complications related to anesthesia: None    Post-anesthesia assessment completed. No concerns    Signed By: Gorge Desir MD     June 1, 2021              Procedure(s):  COLONOSCOPY,EGD   :-  .  ESOPHAGOGASTRODUODENAL (EGD) BIOPSY  ENDOSCOPIC POLYPECTOMY. MAC    <[email or web address]>    INITIAL Post-op Vital signs: No vitals data found for the desired time range.

## 2021-06-01 NOTE — PROCEDURES
[facility] [place] [id number] Kala Lopez M.D.  174 [facility], [address] [place] [address]  [phone]               Colonoscopy Procedure Note    NAME: Princess Espino  :  1945  MRN:  [id number]    Indications:   Personal history of colon polyps (screening only)     : Jacquelin Logan MD    Referring Provider:  Brant Bay MD    Staff: Endoscopy Levada Reach: Teresa MOORE  Endoscopy RN-1: Rosa Cabral RN    Prosthetic devices, grafts, tissues, transplant, or devices implanted: none    Medicines:  MAC anesthesia      Procedure Details:  After informed consent was obtained with all risks and benefits of the procedure explained and preprocedure exam completed, the patient was placed in the left lateral decubitus position. Universal protocol for patient identification was performed and documented in the nursing notes. Throughout the procedure, the patient's blood pressure was monitored at least every five minutes; pulse, and oxygen saturations were monitored continuously. All vital signs were documented in the nursing notes. A digital rectal exam was performed and was normal.  The Olympus videocolonoscope  was inserted in the rectum and carefully advanced to the terminal ileum. The colonoscope was slowly withdrawn with careful evaluation between folds. Retroflexion in the rectum was performed; findings and interventions are described below. Procedure start time, extent reached time/cecum time, and procedure end time are documented in the nursing notes. The quality of preparation was adequate. Findings:   1. Two sessile polyps with greatest diameter of 5 mm in the cecum and ascending colon s/p cold snare polypectomy  2. Several non-bleeding AVMs with greatest diameter of 7 mm in the cecum and ascending colon  3. Moderate diverticulosis in the ascending colon and L colon  4. Large internal hemorrhoids  5.  Rest of the colon and TI were normal     Interventions:    2 complete polypectomy were performed using cold snare  and the polyps were  retrieved    Specimens:   ID Type Source Tests Collected by Time Destination   1 : Gastric Polyp  Preservative Gastric  Cristian Hawkins MD 6/1/2021 1035 Pathology   2 : GE Junction Biopsies Preservative GE Junction  Cristian Hawkins MD 6/1/2021 1037 Pathology   3 : Cecal Polyp Preservative Cecum  Cristian Hawkins MD 6/1/2021 1047 Pathology   4 : Ascending Colon Polyp Preservative Colon, Ascending  Cristian Hawkins MD 6/1/2021 1050 Pathology       EBL:  None. Complications:   No immediate complications     Impression:  -See post-procedure diagnoses. Recommendations:   -Repeat colonoscopy in 5 years if benefits outweigh risks  If < 10 years, reason:  above average risk patient    Resume normal medication(s). Signed by:  Lianet Lira MD          6/1/2021  11:14 AM

## 2021-08-09 ENCOUNTER — PATIENT MESSAGE (OUTPATIENT)
Dept: INTERNAL MEDICINE CLINIC | Age: 76
End: 2021-08-09

## 2021-08-09 RX ORDER — AMLODIPINE BESYLATE 10 MG/1
10 TABLET ORAL DAILY
Qty: 90 TABLET | Refills: 1 | Status: SHIPPED | OUTPATIENT
Start: 2021-08-09 | End: 2021-08-20 | Stop reason: ALTCHOICE

## 2021-08-20 ENCOUNTER — OFFICE VISIT (OUTPATIENT)
Dept: INTERNAL MEDICINE CLINIC | Age: 76
End: 2021-08-20
Payer: MEDICARE

## 2021-08-20 VITALS
WEIGHT: 237 LBS | TEMPERATURE: 97.5 F | OXYGEN SATURATION: 98 % | RESPIRATION RATE: 18 BRPM | DIASTOLIC BLOOD PRESSURE: 69 MMHG | HEART RATE: 60 BPM | HEIGHT: 73 IN | SYSTOLIC BLOOD PRESSURE: 110 MMHG | BODY MASS INDEX: 31.41 KG/M2

## 2021-08-20 DIAGNOSIS — E29.1 MALE HYPOGONADISM: ICD-10-CM

## 2021-08-20 DIAGNOSIS — E53.8 B12 DEFICIENCY: ICD-10-CM

## 2021-08-20 DIAGNOSIS — I50.22 SYSTOLIC CHF, CHRONIC (HCC): ICD-10-CM

## 2021-08-20 DIAGNOSIS — E11.9 TYPE 2 DIABETES MELLITUS WITHOUT COMPLICATION, WITHOUT LONG-TERM CURRENT USE OF INSULIN (HCC): Primary | ICD-10-CM

## 2021-08-20 DIAGNOSIS — I10 HTN (HYPERTENSION), BENIGN: ICD-10-CM

## 2021-08-20 PROCEDURE — G0463 HOSPITAL OUTPT CLINIC VISIT: HCPCS | Performed by: INTERNAL MEDICINE

## 2021-08-20 PROCEDURE — G8536 NO DOC ELDER MAL SCRN: HCPCS | Performed by: INTERNAL MEDICINE

## 2021-08-20 PROCEDURE — 1101F PT FALLS ASSESS-DOCD LE1/YR: CPT | Performed by: INTERNAL MEDICINE

## 2021-08-20 PROCEDURE — G8752 SYS BP LESS 140: HCPCS | Performed by: INTERNAL MEDICINE

## 2021-08-20 PROCEDURE — G8754 DIAS BP LESS 90: HCPCS | Performed by: INTERNAL MEDICINE

## 2021-08-20 PROCEDURE — G8417 CALC BMI ABV UP PARAM F/U: HCPCS | Performed by: INTERNAL MEDICINE

## 2021-08-20 PROCEDURE — G8427 DOCREV CUR MEDS BY ELIG CLIN: HCPCS | Performed by: INTERNAL MEDICINE

## 2021-08-20 PROCEDURE — 99214 OFFICE O/P EST MOD 30 MIN: CPT | Performed by: INTERNAL MEDICINE

## 2021-08-20 PROCEDURE — G8510 SCR DEP NEG, NO PLAN REQD: HCPCS | Performed by: INTERNAL MEDICINE

## 2021-08-20 RX ORDER — SACUBITRIL AND VALSARTAN 49; 51 MG/1; MG/1
1 TABLET, FILM COATED ORAL 2 TIMES DAILY
COMMUNITY
End: 2021-10-25

## 2021-08-20 NOTE — PROGRESS NOTES
HPI:  Emani Allred is a 68y.o. year old male who is here for a follow-up visit. He recently saw his cardiologist and had an echocardiogram revealing an EF that was down to 35%. His stress test apparently revealed? Blockage and he is to have a cardiac cath next week. This testing was done due to ongoing issues with fatigue. He denies any nausea or vomiting. Denies any chest pains. He does have ongoing dyspnea on exertion. No PND or orthopnea. Some ongoing issues with fatigue. He stopped taking his testosterone as he was not sure whether it was necessary or helpful. His blood sugars have not been symptomatic but he is not checking them either. Past Medical History:   Diagnosis Date    CAD (coronary artery disease) 01/23/2017    stent    DJD (degenerative joint disease) of knee 11/2013    left TKR    ED (erectile dysfunction)     Essential hypertension     Hyperlipidemia     T2DM (type 2 diabetes mellitus) ([facility] [address]     Thalassemia minor        Past Surgical History:   Procedure Laterality Date    COLONOSCOPY N/A 6/1/2021    COLONOSCOPY,EGD   :- performed by Vega Ayala MD at [address] [place] [address]  2004    mild CAD, EF 60-65%    HX KNEE ARTHROSCOPY Left 11/2013    HX KNEE REPLACEMENT Left 2013    HX SHOULDER REPLACEMENT Right 1968    HX TURP  02/20/2019    [place] CABG, ARTERY-VEIN, SINGLE  01/19/2017    DAVINCI SABINE TAKEDOWN, LEFT ANTERIOR THORACOTOMY, OFF PUMP CORONARY ARTERY BYPASS GRAFTING X1,    STRESS TEST CARDIOLITE  5/14/2004    5 min, inferior ischemia, EF 60%    STRESS TEST CARDIOLITE  6/30/11    4 min, normal perfusion EF 50%       Prior to Admission medications    Medication Sig Start Date End Date Taking? Authorizing Provider   sacubitriL-valsartan Justino Calderon) 49-51 mg tab tablet Take 1 Tablet by mouth two (2) times a day. Yes Provider, Historical   folic acid/multivit-min/lutein (CENTRUM SILVER PO) Take  by mouth.    Yes Provider, Historical cholecalciferol (VITAMIN D3) (2,000 UNITS /50 MCG) cap capsule Take  by mouth daily. Yes Provider, Historical   glipiZIDE (GLUCOTROL) 5 mg tablet Take 5 mg by mouth daily. Yes Other, MD Evelia   tadalafiL (Cialis) 5 mg tablet Take 5 mg by mouth. Yes Provider, Historical   rosuvastatin (CRESTOR) 10 mg tablet Take 10 mg by mouth daily. Yes Provider, Historical   metFORMIN ER (GLUCOPHAGE XR) 500 mg tablet Take 1,000 mg by mouth daily. Yes Provider, Historical   escitalopram oxalate (Lexapro) 20 mg tablet Take 20 mg by mouth daily. Yes Provider, Historical   acetaminophen (TYLENOL EXTRA STRENGTH) 500 mg tablet Take 1,000 mg by mouth every six (6) hours as needed for Pain. Yes Provider, Historical   aspirin delayed-release 81 mg tablet Take 81 mg by mouth daily. Yes Provider, Historical   amLODIPine (NORVASC) 10 mg tablet Take 1 Tablet by mouth daily. Patient not taking: Reported on 8/20/2021 8/9/21 8/20/21  Caprice Sheridan MD   testosterone (ANDROGEL) 20.25 mg/1.25 gram (1.62 %) gel by TransDERmal route daily. 8/20/21  Provider, Historical   CALCIUM CARBONATE PO Take  by mouth as needed. Patient not taking: Reported on 8/20/2021 8/20/21  Provider, Historical   cyclobenzaprine (FLEXERIL) 5 mg tablet Take 5 mg by mouth as needed for Muscle Spasm(s). Patient not taking: Reported on 8/20/2021 8/20/21  Provider, Historical   magnesium citrate solution Take 296 mL by mouth as needed. 8/20/21  Provider, Historical   docusate sodium (STOOL SOFTENER PO) Take  by mouth as needed. Patient not taking: Reported on 8/20/2021 8/20/21  Provider, Historical   lisinopril-hydroCHLOROthiazide (PRINZIDE, ZESTORETIC) 20-25 mg per tablet Take 1 Tab by mouth daily.   Patient not taking: Reported on 8/20/2021 8/20/21  Provider, Historical       Social History     Socioeconomic History    Marital status:      Spouse name: Not on file    Number of children: Not on file    Years of education: Not on file  Highest education level: Not on file   Occupational History    Not on file   Tobacco Use    Smoking status: Former Smoker     Packs/day: 2.00     Years: 15.00     Pack years: 30.00     Quit date: 1981     Years since quittin.8    Smokeless tobacco: Never Used    Tobacco comment: quit > 30 years ago   Substance and Sexual Activity    Alcohol use: Yes     Comment: 1-2 per month    Drug use: No    Sexual activity: Not on file   Other Topics Concern    Not on file   Social History Narrative    Not on file     Social Determinants of Health     Financial Resource Strain:     Difficulty of Paying Living Expenses:    Food Insecurity:     Worried About Running Out of Food in the Last Year:     [address] in the Last Year:    Transportation Needs:     Lack of Transportation (Medical):      Lack of Transportation (Non-Medical):    Physical Activity:     Days of Exercise per Week:     Minutes of Exercise per Session:    Stress:     Feeling of Stress :    Social Connections:     Frequency of Communication with Friends and Family:     Frequency of Social Gatherings with Friends and Family:     Attends Anabaptist Services:     Active Member of Clubs or Organizations:     Attends Club or Organization Meetings:     Marital Status:    Intimate Partner Violence:     Fear of Current or Ex-Partner:     Emotionally Abused:     Physically Abused:     Sexually Abused:           ROS  Per HPI    Visit Vitals  /69   Pulse 60   Temp 97.5 °F (36.4 °C) (Temporal)   Resp 18   Ht 6' 1\" (1.854 m)   Wt 237 lb (107.5 kg)   SpO2 98%   BMI 31.27 kg/m²         Physical Exam   Physical Examination: General appearance - alert, well appearing, and in no distress  Neck - supple, no significant adenopathy  Lymphatics - no palpable lymphadenopathy, no hepatosplenomegaly  Chest - clear to auscultation, no wheezes, rales or rhonchi, symmetric air entry  Heart - normal rate and regular rhythm  Abdomen - soft, nontender, nondistended, no masses or organomegaly  Extremities - peripheral pulses normal, no pedal edema, no clubbing or cyanosis      Assessment/Plan:  Diagnoses and all orders for this visit:    1. Type 2 diabetes mellitus without complication, without long-term current use of insulin (HCC)appears controlled. Check levels to be sure that that is true.  -     TSH 3RD GENERATION; Future  -     HEMOGLOBIN A1C WITH EAG; Future  -     VITAMIN B12; Future  -     CBC WITH AUTOMATED DIFF; Future  -     METABOLIC PANEL, COMPREHENSIVE; Future    2. HTN (hypertension), benignblood pressure well controlled on current meds. 3. B12 deficiencyprevious B12 level was significantly low. We will repeat levels today. -     VITAMIN B12; Future    4. Male hypogonadismrepeat testosterone level. -     TESTOSTERONE, FREE & TOTAL; Future    5. Systolic CHF, chronic (HCC)agree with plans for cardiac cath and possible cardiac MRI pending those results. His medications were changed with the addition of Entresto and stopping amlodipine and valsartan. Advised him to call back or return to office if symptoms worsen/change/persist.  Discussed expected course/resolution/complications of diagnosis in detail with patient. Medication risks/benefits/costs/interactions/alternatives discussed with patient. He was given an after visit summary which includes diagnoses, current medications, & vitals. He expressed understanding with the diagnosis and plan.

## 2021-08-26 LAB
INR, EXTERNAL: 1.1
PT, EXTERNAL: 12.7

## 2021-10-15 ENCOUNTER — APPOINTMENT (OUTPATIENT)
Dept: GENERAL RADIOLOGY | Age: 76
End: 2021-10-15
Attending: EMERGENCY MEDICINE
Payer: MEDICARE

## 2021-10-15 ENCOUNTER — APPOINTMENT (OUTPATIENT)
Dept: CT IMAGING | Age: 76
End: 2021-10-15
Attending: EMERGENCY MEDICINE
Payer: MEDICARE

## 2021-10-15 ENCOUNTER — HOSPITAL ENCOUNTER (EMERGENCY)
Age: 76
Discharge: HOME | End: 2021-10-16
Attending: EMERGENCY MEDICINE
Payer: MEDICARE

## 2021-10-15 VITALS
TEMPERATURE: 100.7 F | HEIGHT: 73 IN | OXYGEN SATURATION: 100 % | DIASTOLIC BLOOD PRESSURE: 90 MMHG | WEIGHT: 233 LBS | HEART RATE: 64 BPM | SYSTOLIC BLOOD PRESSURE: 137 MMHG | RESPIRATION RATE: 18 BRPM | BODY MASS INDEX: 30.88 KG/M2

## 2021-10-15 DIAGNOSIS — R50.9 FEVER, UNSPECIFIED FEVER CAUSE: ICD-10-CM

## 2021-10-15 DIAGNOSIS — R10.9 FLANK PAIN: ICD-10-CM

## 2021-10-15 DIAGNOSIS — N28.89 RENAL MASS, RIGHT: Primary | ICD-10-CM

## 2021-10-15 LAB
ALBUMIN SERPL-MCNC: 3.5 G/DL (ref 3.5–5)
ALBUMIN/GLOB SERPL: 1 {RATIO} (ref 1.1–2.2)
ALP SERPL-CCNC: 64 U/L (ref 45–117)
ALT SERPL-CCNC: 18 U/L (ref 12–78)
ANION GAP SERPL CALC-SCNC: 4 MMOL/L (ref 5–15)
APPEARANCE UR: CLEAR
AST SERPL-CCNC: 10 U/L (ref 15–37)
BACTERIA URNS QL MICRO: NEGATIVE /HPF
BASOPHILS # BLD: 0 K/UL (ref 0–0.1)
BASOPHILS NFR BLD: 0 % (ref 0–1)
BILIRUB SERPL-MCNC: 0.8 MG/DL (ref 0.2–1)
BILIRUB UR QL: NEGATIVE
BUN SERPL-MCNC: 18 MG/DL (ref 6–20)
BUN/CREAT SERPL: 18 (ref 12–20)
CALCIUM SERPL-MCNC: 8.3 MG/DL (ref 8.5–10.1)
CHLORIDE SERPL-SCNC: 109 MMOL/L (ref 97–108)
CO2 SERPL-SCNC: 27 MMOL/L (ref 21–32)
COLOR UR: ABNORMAL
CREAT SERPL-MCNC: 1.01 MG/DL (ref 0.7–1.3)
DIFFERENTIAL METHOD BLD: ABNORMAL
EOSINOPHIL # BLD: 0.1 K/UL (ref 0–0.4)
EOSINOPHIL NFR BLD: 1 % (ref 0–7)
EPITH CASTS URNS QL MICRO: ABNORMAL /LPF
ERYTHROCYTE [DISTWIDTH] IN BLOOD BY AUTOMATED COUNT: 18.3 % (ref 11.5–14.5)
GLOBULIN SER CALC-MCNC: 3.4 G/DL (ref 2–4)
GLUCOSE SERPL-MCNC: 194 MG/DL (ref 65–100)
GLUCOSE UR STRIP.AUTO-MCNC: NEGATIVE MG/DL
HCT VFR BLD AUTO: 32.4 % (ref 36.6–50.3)
HGB BLD-MCNC: 10.1 G/DL (ref 12.1–17)
HGB UR QL STRIP: NEGATIVE
HYALINE CASTS URNS QL MICRO: ABNORMAL /LPF (ref 0–5)
IMM GRANULOCYTES # BLD AUTO: 0 K/UL (ref 0–0.04)
IMM GRANULOCYTES NFR BLD AUTO: 0 % (ref 0–0.5)
KETONES UR QL STRIP.AUTO: NEGATIVE MG/DL
LACTATE SERPL-SCNC: 1.4 MMOL/L (ref 0.4–2)
LEUKOCYTE ESTERASE UR QL STRIP.AUTO: NEGATIVE
LYMPHOCYTES # BLD: 1.8 K/UL (ref 0.8–3.5)
LYMPHOCYTES NFR BLD: 17 % (ref 12–49)
MCH RBC QN AUTO: 19 PG (ref 26–34)
MCHC RBC AUTO-ENTMCNC: 31.2 G/DL (ref 30–36.5)
MCV RBC AUTO: 61 FL (ref 80–99)
MONOCYTES # BLD: 0.9 K/UL (ref 0–1)
MONOCYTES NFR BLD: 9 % (ref 5–13)
NEUTS SEG # BLD: 7.7 K/UL (ref 1.8–8)
NEUTS SEG NFR BLD: 73 % (ref 32–75)
NITRITE UR QL STRIP.AUTO: NEGATIVE
NRBC # BLD: 0 K/UL (ref 0–0.01)
NRBC BLD-RTO: 0 PER 100 WBC
PH UR STRIP: 6 [PH] (ref 5–8)
PLATELET # BLD AUTO: 256 K/UL (ref 150–400)
PMV BLD AUTO: 10.3 FL (ref 8.9–12.9)
POTASSIUM SERPL-SCNC: 3.8 MMOL/L (ref 3.5–5.1)
PROT SERPL-MCNC: 6.9 G/DL (ref 6.4–8.2)
PROT UR STRIP-MCNC: ABNORMAL MG/DL
RBC # BLD AUTO: 5.31 M/UL (ref 4.1–5.7)
RBC #/AREA URNS HPF: ABNORMAL /HPF (ref 0–5)
RBC MORPH BLD: ABNORMAL
RBC MORPH BLD: ABNORMAL
SODIUM SERPL-SCNC: 140 MMOL/L (ref 136–145)
SP GR UR REFRACTOMETRY: 1.02 (ref 1–1.03)
UA: UC IF INDICATED,UAUC: ABNORMAL
UROBILINOGEN UR QL STRIP.AUTO: 1 EU/DL (ref 0.2–1)
WBC # BLD AUTO: 10.5 K/UL (ref 4.1–11.1)
WBC URNS QL MICRO: ABNORMAL /HPF (ref 0–4)

## 2021-10-15 PROCEDURE — 83605 ASSAY OF LACTIC ACID: CPT

## 2021-10-15 PROCEDURE — 71046 X-RAY EXAM CHEST 2 VIEWS: CPT

## 2021-10-15 PROCEDURE — 80053 COMPREHEN METABOLIC PANEL: CPT

## 2021-10-15 PROCEDURE — 99282 EMERGENCY DEPT VISIT SF MDM: CPT

## 2021-10-15 PROCEDURE — 87040 BLOOD CULTURE FOR BACTERIA: CPT

## 2021-10-15 PROCEDURE — 74177 CT ABD & PELVIS W/CONTRAST: CPT

## 2021-10-15 PROCEDURE — 85025 COMPLETE CBC W/AUTO DIFF WBC: CPT

## 2021-10-15 PROCEDURE — U0005 INFEC AGEN DETEC AMPLI PROBE: HCPCS

## 2021-10-15 PROCEDURE — 74011000636 HC RX REV CODE- 636: Performed by: RADIOLOGY

## 2021-10-15 PROCEDURE — 81001 URINALYSIS AUTO W/SCOPE: CPT

## 2021-10-15 PROCEDURE — 36415 COLL VENOUS BLD VENIPUNCTURE: CPT

## 2021-10-15 RX ORDER — CYCLOBENZAPRINE HCL 10 MG
10 TABLET ORAL
Qty: 20 TABLET | Refills: 0 | Status: SHIPPED | OUTPATIENT
Start: 2021-10-15

## 2021-10-15 RX ADMIN — IOPAMIDOL 100 ML: 755 INJECTION, SOLUTION INTRAVENOUS at 21:15

## 2021-10-15 NOTE — ED TRIAGE NOTES
Patient arrives ambulatory to ED with complaints of right flank/back pain that has been present all day     Patient took 500 mg tylenol this morning and a muscle relaxer around noon

## 2021-10-16 LAB — SARS-COV-2, COV2: NORMAL

## 2021-10-16 NOTE — DISCHARGE INSTRUCTIONS
Please follow closely with your primary care doctor. It is important that you have further imaging like an MRI or a renal CT scan to further evaluate the 2 cm lesion noted on the right kidney. This is not changed since a CAT scan in May however needs to be further evaluated. Please return to the emergency department for any new or worsening symptoms.

## 2021-10-16 NOTE — ED PROVIDER NOTES
Patient is a 77-year-old male with past medical history significant for coronary artery disease status post stent followed by local cardiologist, hypertension, hyperlipidemia and type 2 diabetes who presents emergency department for general malaise, fatigue and right flank pain that started today. He states that he had been doing some more exercising recently to include chair yoga which she has little trouble with because he has had a right shoulder replacement and there is a lot of things he cannot do. He states at first he thought but this may have been the cause of his pain however noted he started to feel generally worse and elected to come to the emergency department. Denies any fever or rigors. Denies any blood or pain with urination denies any nausea or vomiting. Denies any diarrhea or abdominal pain. Patient states that he is fully vaccinated has had both doses of moderna. 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 Denies any known exposures however.            Past Medical History:   Diagnosis Date    CAD (coronary artery disease) 01/23/2017    stent    DJD (degenerative joint disease) of knee 11/2013    left TKR    ED (erectile dysfunction)     Essential hypertension     Hyperlipidemia     [facility]DM ([facility] mellitus) ([facility] [address]     Thalassemia minor        Past Surgical History:   Procedure Laterality Date    COLONOSCOPY N/A 6/1/2021    COLONOSCOPY,EGD   :- performed by Bladimir Baum MD at [address] [place] [address]  2004    mild CAD, EF 60-65%    HX KNEE ARTHROSCOPY Left 11/2013    HX KNEE REPLACEMENT Left 2013    HX SHOULDER REPLACEMENT Right 1968    HX TURP  02/20/2019    [place] CABG, ARTERY-VEIN, SINGLE  01/19/2017    DAVINCI SABINE TAKEDOWN, LEFT ANTERIOR THORACOTOMY, OFF PUMP CORONARY ARTERY BYPASS GRAFTING X1,    STRESS TEST CARDIOLITE 2004    5 min, inferior ischemia, EF 60%    STRESS TEST CARDIOLITE  11    4 min, normal perfusion EF 50%         Family History:   Problem Relation Age of Onset    Cancer Mother         breast    Diabetes Maternal Grandfather     Hypertension Maternal Grandfather     Other Brother         kathy cardiac    Diabetes Brother     Heart Disease Brother        Social History     Socioeconomic History    Marital status:      Spouse name: Not on file    Number of children: Not on file    Years of education: Not on file    Highest education level: Not on file   Occupational History    Not on file   Tobacco Use    Smoking status: Former Smoker     Packs/day: 2.00     Years: 15.00     Pack years: 30.00     Quit date: 1981     Years since quittin.9    Smokeless tobacco: Never Used    Tobacco comment: quit > 30 years ago   Substance and Sexual Activity    Alcohol use: Yes     Comment: 1-2 per month    Drug use: No    Sexual activity: Not on file   Other Topics Concern    Not on file   Social History Narrative    Not on file     Social Determinants of Health     Financial Resource Strain:     Difficulty of Paying Living Expenses:    Food Insecurity:     Worried About Running Out of Food in the Last Year:     Larry of Food in the Last Year:    Transportation Needs:     Lack of Transportation (Medical):      Lack of Transportation (Non-Medical):    Physical Activity:     Days of Exercise per Week:     Minutes of Exercise per Session:    Stress:     Feeling of Stress :    Social Connections:     Frequency of Communication with Friends and Family:     Frequency of Social Gatherings with Friends and Family:     Attends Islam Services:     Active Member of Clubs or Organizations:     Attends Club or Organization Meetings:     Marital Status:    Intimate Partner Violence:     Fear of Current or Ex-Partner:     Emotionally Abused:     Physically Abused:     Sexually Abused: ALLERGIES: Patient has no known allergies. Review of Systems   Constitutional: Positive for activity change and fatigue. HENT: Negative for drooling. Respiratory: Negative for cough, choking, wheezing and stridor. Cardiovascular: Negative for chest pain and leg swelling. Gastrointestinal: Negative for abdominal pain, diarrhea, nausea and vomiting. Genitourinary: Positive for flank pain. Negative for dysuria and hematuria. Musculoskeletal: Negative for neck pain and neck stiffness. Skin: Negative for rash and wound. Neurological: Negative for tremors, seizures, syncope, speech difficulty and numbness. Hematological: Does not bruise/bleed easily. Psychiatric/Behavioral: Negative. Vitals:    10/15/21 1932 10/15/21 2333   BP: (!) 169/67 (!) 137/90   Pulse: 71 64   Resp: 17 18   Temp: (!) 101 °F (38.3 °C) (!) 100.7 °F (38.2 °C)   SpO2: 99% 100%   Weight: 105.7 kg (233 lb)    Height: 6' 1\" (1.854 m)             Physical Exam  Vitals and nursing note reviewed. Constitutional:       Appearance: He is not toxic-appearing or diaphoretic. HENT:      Head: Normocephalic and atraumatic. Right Ear: External ear normal.      Left Ear: External ear normal.      Nose: Nose normal.   Eyes:      General: No scleral icterus. Cardiovascular:      Rate and Rhythm: Normal rate. Pulses: Normal pulses. Heart sounds: No friction rub. Pulmonary:      Effort: Pulmonary effort is normal.      Breath sounds: Normal breath sounds. Abdominal:      General: There is no distension. Palpations: Abdomen is soft. Tenderness: There is no abdominal tenderness. There is no guarding or rebound. Musculoskeletal:         General: No deformity. Normal range of motion. Cervical back: Neck supple. Right lower leg: No edema. Left lower leg: No edema. Skin:     General: Skin is warm and dry. Findings: No bruising, erythema, lesion or rash.    Neurological: Mental Status: He is alert and oriented to person, place, and time. Psychiatric:         Mood and Affect: Mood normal.         Behavior: Behavior normal.         Thought Content: Thought content normal.         Judgment: Judgment normal.          MDM  Number of Diagnoses or Management Options  Fever, unspecified fever cause  Flank pain  Renal mass, right  Diagnosis management comments: Work-up is reassuring. Will test for Covid infection due to fever earlier this evening and no other explanation of his symptoms. Patient also advised extensively of small lesion noted on his kidney that needed further work-up. Patient given strict warning signs and symptoms to return.        Amount and/or Complexity of Data Reviewed  Clinical lab tests: reviewed and ordered  Tests in the radiology section of CPT®: ordered and reviewed  Decide to obtain previous medical records or to obtain history from someone other than the patient: yes           Procedures

## 2021-10-17 LAB
SARS-COV-2, XPLCVT: NOT DETECTED
SOURCE, COVRS: NORMAL

## 2021-10-20 LAB
BACTERIA SPEC CULT: NORMAL
SERVICE CMNT-IMP: NORMAL

## 2021-10-25 ENCOUNTER — OFFICE VISIT (OUTPATIENT)
Dept: INTERNAL MEDICINE CLINIC | Age: 76
End: 2021-10-25
Payer: MEDICARE

## 2021-10-25 VITALS
HEIGHT: 73 IN | OXYGEN SATURATION: 100 % | WEIGHT: 238.23 LBS | DIASTOLIC BLOOD PRESSURE: 85 MMHG | HEART RATE: 59 BPM | RESPIRATION RATE: 18 BRPM | BODY MASS INDEX: 31.57 KG/M2 | TEMPERATURE: 97.8 F | SYSTOLIC BLOOD PRESSURE: 175 MMHG

## 2021-10-25 DIAGNOSIS — I25.10 CORONARY ARTERY DISEASE INVOLVING NATIVE CORONARY ARTERY OF NATIVE HEART WITHOUT ANGINA PECTORIS: ICD-10-CM

## 2021-10-25 DIAGNOSIS — I63.9 CEREBROVASCULAR ACCIDENT (CVA), UNSPECIFIED MECHANISM (HCC): ICD-10-CM

## 2021-10-25 DIAGNOSIS — E11.9 TYPE 2 DIABETES MELLITUS WITHOUT COMPLICATION, WITHOUT LONG-TERM CURRENT USE OF INSULIN (HCC): ICD-10-CM

## 2021-10-25 DIAGNOSIS — I10 HTN (HYPERTENSION), BENIGN: Primary | ICD-10-CM

## 2021-10-25 PROCEDURE — 1101F PT FALLS ASSESS-DOCD LE1/YR: CPT | Performed by: INTERNAL MEDICINE

## 2021-10-25 PROCEDURE — G8510 SCR DEP NEG, NO PLAN REQD: HCPCS | Performed by: INTERNAL MEDICINE

## 2021-10-25 PROCEDURE — G8754 DIAS BP LESS 90: HCPCS | Performed by: INTERNAL MEDICINE

## 2021-10-25 PROCEDURE — G8753 SYS BP > OR = 140: HCPCS | Performed by: INTERNAL MEDICINE

## 2021-10-25 PROCEDURE — G8536 NO DOC ELDER MAL SCRN: HCPCS | Performed by: INTERNAL MEDICINE

## 2021-10-25 PROCEDURE — G8417 CALC BMI ABV UP PARAM F/U: HCPCS | Performed by: INTERNAL MEDICINE

## 2021-10-25 PROCEDURE — G8427 DOCREV CUR MEDS BY ELIG CLIN: HCPCS | Performed by: INTERNAL MEDICINE

## 2021-10-25 PROCEDURE — 99214 OFFICE O/P EST MOD 30 MIN: CPT | Performed by: INTERNAL MEDICINE

## 2021-10-25 RX ORDER — LISINOPRIL 40 MG/1
40 TABLET ORAL DAILY
Qty: 90 TABLET | Refills: 2 | Status: SHIPPED | OUTPATIENT
Start: 2021-10-25

## 2021-10-25 RX ORDER — LISINOPRIL 20 MG/1
20 TABLET ORAL 2 TIMES DAILY
COMMUNITY
Start: 2021-10-25 | End: 2021-10-25 | Stop reason: DRUGHIGH

## 2021-10-25 RX ORDER — CARVEDILOL 6.25 MG/1
TABLET ORAL
COMMUNITY
Start: 2021-10-12 | End: 2021-10-25

## 2021-10-25 RX ORDER — LISINOPRIL 20 MG/1
TABLET ORAL
COMMUNITY
Start: 2021-10-14 | End: 2021-10-25

## 2021-10-25 RX ORDER — CARVEDILOL 6.25 MG/1
6.25 TABLET ORAL 2 TIMES DAILY
COMMUNITY
Start: 2021-10-25

## 2021-10-25 NOTE — PROGRESS NOTES
HPI:  Godwin Ahmadi is a 68y.o. year old male who is here for an emergency room follow-up visit. Apparently he was seen there for fatigue last week and had a fever of 100.7. His blood pressure at home have been quite elevated and has continued to be so. He stopped Entresto a month ago and was placed on Coreg and lisinopril. Over the last few days his fatigue has improved. He has had no further episodes of fever. No headaches. No dizziness. No nosebleeds. No chest pains or shortness of breath. No PND or orthopnea. His hemoglobin in the ER was 10 but that is actually stable. His blood sugar was elevated at 190 but not fasting. Past Medical History:   Diagnosis Date    CAD (coronary artery disease) 01/23/2017    stent    DJD (degenerative joint disease) of knee 11/2013    left TKR    ED (erectile dysfunction)     Essential hypertension     Hyperlipidemia     T[facility]DM (type 2 [facility] mellitus) ([facility] [address]     Thalassemia minor        Past Surgical History:   Procedure Laterality Date    COLONOSCOPY N/A 6/1/2021    COLONOSCOPY,EGD   :- performed by Delfino Johns MD at [address] [place] [address]  2004    mild CAD, EF 60-65%    HX KNEE ARTHROSCOPY Left 11/2013    HX KNEE REPLACEMENT Left 2013    HX SHOULDER REPLACEMENT Right 1968    HX TURP  02/20/2019    [place] CABG, ARTERY-VEIN, SINGLE  01/19/2017    DAVINCI SABINE TAKEDOWN, LEFT ANTERIOR THORACOTOMY, OFF PUMP CORONARY ARTERY BYPASS GRAFTING X1,    STRESS TEST CARDIOLITE  5/14/2004    5 min, inferior ischemia, EF 60%    STRESS TEST CARDIOLITE  6/30/11    4 min, normal perfusion EF 50%       Prior to Admission medications    Medication Sig Start Date End Date Taking? Authorizing Provider   carvediloL (COREG) 6.25 mg tablet Take 1 Tablet by mouth two (2) times a day. 10/25/21  Yes Jaya Prasad MD   lisinopriL (PRINIVIL, ZESTRIL) 40 mg tablet Take 1 Tablet by mouth daily.  10/25/21  Yes Jaya Prasad MD   cyclobenzaprine (FLEXERIL) 10 mg tablet Take 1 Tablet by mouth three (3) times daily as needed for Muscle Spasm(s). 10/15/21  Yes Meka Amador MD   folic acid/multivit-min/lutein (CENTRUM SILVER PO) Take  by mouth. Yes Provider, Historical   cholecalciferol (VITAMIN D3) (2,000 UNITS /50 MCG) cap capsule Take  by mouth daily. Yes Provider, Historical   glipiZIDE (GLUCOTROL) 5 mg tablet Take 5 mg by mouth daily. Yes Other, MD Eveila   rosuvastatin (CRESTOR) 10 mg tablet Take 10 mg by mouth daily. Yes Provider, Historical   metFORMIN ER (GLUCOPHAGE XR) 500 mg tablet Take 1,000 mg by mouth daily. Yes Provider, Historical   escitalopram oxalate (Lexapro) 20 mg tablet Take 20 mg by mouth daily. Yes Provider, Historical   acetaminophen (TYLENOL EXTRA STRENGTH) 500 mg tablet Take 1,000 mg by mouth every six (6) hours as needed for Pain. Yes Provider, Historical   aspirin delayed-release 81 mg tablet Take 81 mg by mouth daily. Yes Provider, Historical   carvediloL (COREG) 6.25 mg tablet  10/12/21 10/25/21  Provider, Historical   lisinopriL (PRINIVIL, ZESTRIL) 20 mg tablet  10/14/21 10/25/21  Provider, Historical   lisinopriL (PRINIVIL, ZESTRIL) 20 mg tablet Take 1 Tablet by mouth two (2) times a day. 10/25/21 10/25/21  Miya Reeves MD   sacubitriL[facility]valsartan [facility]) 49-51 mg tab tablet Take 1 Tablet by mouth two (2) times a day. Patient not taking: Reported on 10/25/2021  10/25/21  Provider, Historical   tadalafiL (Cialis) 5 mg tablet Take 5 mg by mouth.   Patient not taking: Reported on 10/25/2021    Provider, Historical       Social History     Socioeconomic History    Marital status:      Spouse name: Not on file    Number of children: Not on file    Years of education: Not on file    Highest education level: Not on file   Occupational History    Not on file   Tobacco Use    Smoking status: Former Smoker     Packs/day: 2.00     Years: 15.00     Pack years: 30.00     Quit date: 11/4/1981 Years since quittin.0    Smokeless tobacco: Never Used    Tobacco comment: quit > 30 years ago   Vaping Use    Vaping Use: Never used   Substance and Sexual Activity    Alcohol use: Yes     Comment: 1-2 per month    Drug use: No    Sexual activity: Not on file   Other Topics Concern    Not on file   Social History Narrative    Not on file     Social Determinants of Health     Financial Resource Strain:     Difficulty of Paying Living Expenses:    Food Insecurity:     Worried About Running Out of Food in the Last Year:     Ran Out of Food in the Last Year:    Transportation Needs:     Lack of Transportation (Medical):      Lack of Transportation (Non-Medical):    Physical Activity:     Days of Exercise per Week:     Minutes of Exercise per Session:    Stress:     Feeling of Stress :    Social Connections:     Frequency of Communication with Friends and Family:     Frequency of Social Gatherings with Friends and Family:     Attends Religion Services:     Active Member of Clubs or Organizations:     Attends Club or Organization Meetings:     Marital Status:    Intimate Partner Violence:     Fear of Current or Ex-Partner:     Emotionally Abused:     Physically Abused:     Sexually Abused:           ROS  Per HPI    Visit Vitals  BP (!) 175/85 (BP 1 Location: Right arm, BP Patient Position: Sitting, BP Cuff Size: Large adult)   Pulse (!) 59   Temp 97.8 °F (36.6 °C) (Temporal)   Resp 18   Ht 6' 1\" (1.854 m)   Wt 238 lb 3.7 oz (108.1 kg)   SpO2 100%   BMI 31.43 kg/m²         Physical Exam   Physical Examination: General appearance - alert, well appearing, and in no distress  Neck - supple, no significant adenopathy  Lymphatics - no palpable lymphadenopathy, no hepatosplenomegaly  Chest - clear to auscultation, no wheezes, rales or rhonchi, symmetric air entry  Heart - normal rate and regular rhythm  Abdomen - soft, nontender, nondistended, no masses or organomegaly      Assessment/Plan:  Diagnoses and all orders for this visit:    1. HTN (hypertension), benign blood pressure not well controlled. Will increase lisinopril to 40 mg a day and he will continue to monitor. Continue Coreg without increase as his heart rate was on the low side. Will provide information to his cardiologist and he will follow up with them in 4 to 6 weeks. If he does not have an appointment with them in that timeframe, he will see me. 2. Type 2 diabetes mellitus without complication, without long-term current use of insulin (HCC)stable by recent labs. Hemoglobin A1c was 6.3.    3. Coronary artery disease involving native coronary artery of native heart without angina pectorisstable with no recurrence. 4. Cerebrovascular accident (CVA), unspecified mechanism (HCC)stable on current meds. Other orders  -     lisinopriL (PRINIVIL, ZESTRIL) 40 mg tablet; Take 1 Tablet by mouth daily. Advised him to call back or return to office if symptoms worsen/change/persist.  Discussed expected course/resolution/complications of diagnosis in detail with patient. Medication risks/benefits/costs/interactions/alternatives discussed with patient. He was given an after visit summary which includes diagnoses, current medications, & vitals. He expressed understanding with the diagnosis and plan.

## 2021-10-25 NOTE — PROGRESS NOTES
Chief Complaint   Patient presents with   [facility] Follow Up     [facility] ER follow up       1. Have you been to the ER, urgent care clinic since your last visit? Hospitalized since your last visit? Yes When:  Where: [facility] ER  Reason for visit: fever and flank pafin    2. Have you seen or consulted any other health care providers outside of the [address] [place] [address] since your last visit? Include any pap smears or colon screening.  No

## 2021-11-02 ENCOUNTER — TELEPHONE (OUTPATIENT)
Dept: INTERNAL MEDICINE CLINIC | Age: 76
End: 2021-11-02

## 2021-11-02 NOTE — TELEPHONE ENCOUNTER
Chief Complaint   Patient presents with    New Order     Received incoming fax from [facility] for new order for diabetic shoes and inserts. This was not discussed at previous visit. Patient will need F2F and foot exam in order for insurance to cover. Attempted to reach patient to inform on this - left detail message on personal vmail that forms were received and the requirements needed. Advised can discuss and complete at visit on 11/22/2021.   Forms held at nurses station on side C.

## 2021-11-22 ENCOUNTER — OFFICE VISIT (OUTPATIENT)
Dept: INTERNAL MEDICINE CLINIC | Age: 76
End: 2021-11-22
Payer: MEDICARE

## 2021-11-22 VITALS
HEIGHT: 73 IN | HEART RATE: 61 BPM | BODY MASS INDEX: 31.09 KG/M2 | RESPIRATION RATE: 18 BRPM | TEMPERATURE: 98 F | DIASTOLIC BLOOD PRESSURE: 80 MMHG | WEIGHT: 234.6 LBS | OXYGEN SATURATION: 99 % | SYSTOLIC BLOOD PRESSURE: 180 MMHG

## 2021-11-22 DIAGNOSIS — I63.9 CEREBROVASCULAR ACCIDENT (CVA), UNSPECIFIED MECHANISM (HCC): ICD-10-CM

## 2021-11-22 DIAGNOSIS — E11.9 TYPE 2 DIABETES MELLITUS WITHOUT COMPLICATION, WITHOUT LONG-TERM CURRENT USE OF INSULIN (HCC): ICD-10-CM

## 2021-11-22 DIAGNOSIS — I10 HTN (HYPERTENSION), BENIGN: Primary | ICD-10-CM

## 2021-11-22 DIAGNOSIS — I25.10 CORONARY ARTERY DISEASE INVOLVING NATIVE CORONARY ARTERY OF NATIVE HEART WITHOUT ANGINA PECTORIS: ICD-10-CM

## 2021-11-22 PROCEDURE — G8753 SYS BP > OR = 140: HCPCS | Performed by: INTERNAL MEDICINE

## 2021-11-22 PROCEDURE — G8427 DOCREV CUR MEDS BY ELIG CLIN: HCPCS | Performed by: INTERNAL MEDICINE

## 2021-11-22 PROCEDURE — G8536 NO DOC ELDER MAL SCRN: HCPCS | Performed by: INTERNAL MEDICINE

## 2021-11-22 PROCEDURE — 99214 OFFICE O/P EST MOD 30 MIN: CPT | Performed by: INTERNAL MEDICINE

## 2021-11-22 PROCEDURE — G0463 HOSPITAL OUTPT CLINIC VISIT: HCPCS | Performed by: INTERNAL MEDICINE

## 2021-11-22 PROCEDURE — 1101F PT FALLS ASSESS-DOCD LE1/YR: CPT | Performed by: INTERNAL MEDICINE

## 2021-11-22 PROCEDURE — G8754 DIAS BP LESS 90: HCPCS | Performed by: INTERNAL MEDICINE

## 2021-11-22 PROCEDURE — G8510 SCR DEP NEG, NO PLAN REQD: HCPCS | Performed by: INTERNAL MEDICINE

## 2021-11-22 PROCEDURE — G8417 CALC BMI ABV UP PARAM F/U: HCPCS | Performed by: INTERNAL MEDICINE

## 2021-11-22 RX ORDER — AMLODIPINE BESYLATE 5 MG/1
5 TABLET ORAL DAILY
COMMUNITY
Start: 2021-11-22 | End: 2022-02-25 | Stop reason: SDUPTHER

## 2021-11-22 NOTE — PROGRESS NOTES
Chief Complaint   Patient presents with    Blood Pressure Check     follow up     Medication Evaluation     face to face for DM        1. Have you been to the ER, urgent care clinic since your last visit? Hospitalized since your last visit? No    2. Have you seen or consulted any other health care providers outside of the [address] [place] [address] since your last visit? Include any pap smears or colon screening.  No

## 2021-11-22 NOTE — PROGRESS NOTES
HPI:  Kang Encarnacion is a 68y.o. year old male who is here for a follow-up visit. His blood pressures have remained elevated at home. He did increase his medication but it does not seem to have made much difference. Some occasional lightheadedness. Occasional headache. No nosebleeds. No chest pains or shortness of breath. No cough or wheeze. No change in bowel or bladder habits. He also needs to have new shoes made for his diabetes. He has chronic degenerative changes in both feet with chronic pain there. Past Medical History:   Diagnosis Date    CAD (coronary artery disease) 01/23/2017    stent    DJD (degenerative joint disease) of knee 11/2013    left TKR    ED (erectile dysfunction)     Essential hypertension     Hyperlipidemia     T2DM (type 2 diabetes mellitus) ([facility] [address]     Thalassemia minor        Past Surgical History:   Procedure Laterality Date    COLONOSCOPY N/A 6/1/2021    COLONOSCOPY,EGD   :- performed by Hoda Cedeño MD at [address] [place] [address]  2004    mild CAD, EF 60-65%    HX KNEE ARTHROSCOPY Left 11/2013    HX KNEE REPLACEMENT Left 2013    HX SHOULDER REPLACEMENT Right 1968    HX TURP  02/20/2019    NM CABG, ARTERY-VEIN, SINGLE  01/19/2017    DAVINCI SABINE TAKEDOWN, LEFT ANTERIOR THORACOTOMY, OFF PUMP CORONARY ARTERY BYPASS GRAFTING X1,    STRESS TEST CARDIOLITE  5/14/2004    5 min, inferior ischemia, EF 60%    STRESS TEST CARDIOLITE  6/30/11    4 min, normal perfusion EF 50%       Prior to Admission medications    Medication Sig Start Date End Date Taking? Authorizing Provider   chlorpheniramine/dextromethorp (CORICIDIN HBP COUGH AND COLD PO) Take  by mouth. Yes Provider, Historical   amLODIPine (NORVASC) 5 mg tablet Take 1 Tablet by mouth daily. 11/22/21  Yes Andrew Rose MD   carvediloL (COREG) 6.25 mg tablet Take 1 Tablet by mouth two (2) times a day.  10/25/21  Yes Andrew Rose MD   lisinopriL (PRINIVIL, ZESTRIL) 40 mg tablet Take 1 Tablet by mouth daily. 10/25/21  Yes Cinthya Bartholomew MD   cyclobenzaprine (FLEXERIL) 10 mg tablet Take 1 Tablet by mouth three (3) times daily as needed for Muscle Spasm(s). 10/15/21  Yes Kelsy Dover MD   folic acid/multivit-min/lutein (CENTRUM SILVER PO) Take  by mouth. Yes Provider, Historical   cholecalciferol (VITAMIN D3) (2,000 UNITS /50 MCG) cap capsule Take  by mouth daily. Yes Provider, Historical   glipiZIDE (GLUCOTROL) 5 mg tablet Take 5 mg by mouth daily. Yes Other, MD Evelia   rosuvastatin (CRESTOR) 10 mg tablet Take 10 mg by mouth daily. Yes Provider, Historical   metFORMIN ER (GLUCOPHAGE XR) 500 mg tablet Take 1,000 mg by mouth daily. Yes Provider, Historical   escitalopram oxalate (Lexapro) 20 mg tablet Take 20 mg by mouth daily. Yes Provider, Historical   acetaminophen (TYLENOL EXTRA STRENGTH) 500 mg tablet Take 1,000 mg by mouth every six (6) hours as needed for Pain. Yes Provider, Historical   aspirin delayed-release 81 mg tablet Take 81 mg by mouth daily.    Yes Provider, Historical   tadalafiL (Cialis) 5 mg tablet Take 5 mg by mouth.  21  Provider, Historical       Social History     Socioeconomic History    Marital status:      Spouse name: Not on file    Number of children: Not on file    Years of education: Not on file    Highest education level: Not on file   Occupational History    Not on file   Tobacco Use    Smoking status: Former Smoker     Packs/day: 2.00     Years: 15.00     Pack years: 30.00     Quit date: 1981     Years since quittin.0    Smokeless tobacco: Never Used    Tobacco comment: quit > 30 years ago   Vaping Use    Vaping Use: Never used   Substance and Sexual Activity    Alcohol use: Yes     Comment: 1-2 per month    Drug use: No    Sexual activity: Not on file   Other Topics Concern    Not on file   Social History Narrative    Not on file     Social Determinants of Health     Financial Resource Strain:  Difficulty of Paying Living Expenses: Not on file   Food Insecurity:     Worried About Running Out of Food in the Last Year: Not on file    Ran Out of Food in the Last Year: Not on file   Transportation Needs:     Lack of Transportation (Medical): Not on file    Lack of Transportation (Non-Medical):  Not on file   Physical Activity:     Days of Exercise per Week: Not on file    Minutes of Exercise per Session: Not on file   Stress:     Feeling of Stress : Not on file   Social Connections:     Frequency of Communication with Friends and Family: Not on file    Frequency of Social Gatherings with Friends and Family: Not on file    Attends Catholic Services: Not on file    Active Member of [address] [place] [address] or Organizations: Not on file    Attends Club or Organization Meetings: Not on file    Marital Status: Not on file   Intimate Partner Violence:     Fear of Current or Ex-Partner: Not on file    Emotionally Abused: Not on file    Physically Abused: Not on file    Sexually Abused: Not on file   Housing Stability:     Unable to Pay for Housing in the Last Year: Not on file    Number of [place] in the Last Year: Not on file    Unstable Housing in the Last Year: Not on file          ROS  Per HPI    Visit Vitals  BP (!) 180/80 (BP 1 Location: Right arm, BP Patient Position: Sitting, BP Cuff Size: Large adult)   Pulse 61   Temp 98 °F (36.7 °C) (Temporal)   Resp 18   Ht 6' 1\" (1.854 m)   Wt 234 lb 9.6 oz (106.4 kg)   SpO2 99%   BMI 30.95 kg/m²         Physical Exam   Physical Examination: General appearance - alert, well appearing, and in no distress  Chest - clear to auscultation, no wheezes, rales or rhonchi, symmetric air entry  Heart - normal rate, regular rhythm, normal S1, S2, no murmurs, rubs, clicks or gallops  Abdomen - soft, nontender, nondistended, no masses or organomegaly  Musculoskeletal - no joint tenderness, deformity or swelling  Extremities - peripheral pulses normal, no pedal edema, no clubbing or cyanosis       Diabetic foot exam performed by Wynema Bamberger, MD     Measurement  Response Nurse Comment Physician Comment   Monofilament  R - reduced sensation with micro filament  L - reduced sensation with micro filament     Pulse DP R - 2+ (normal)  L - 2+ (normal)     Pulse TP R - 2+ (normal)  L - 2+ (normal)     Structural deformity R - Yes -complete flatfeet with degenerative changes in the toes and metatarsal joints. L - Yes -per above. Skin Integrity / Deformity R - None  L - None        Reviewed by:                 Assessment/Plan:  Diagnoses and all orders for this visit:    1. HTN (hypertension), benignblood pressure not well controlled. We will add back amlodipine 5 mg a day. He will report blood pressure readings over the next 2 weeks and has a follow-up visit coming up with the cardiologist.    2. Type 2 diabetes mellitus without complication, without long-term current use of insulin (HCC)controlled by recent labs. Will provide an order for diabetic shoes. Will repeat blood work in 3 months.  -      DIABETES FOOT EXAM    3. Coronary artery disease involving native coronary artery of native heart without angina pectorisstable. 4. Cerebrovascular accident (CVA), unspecified mechanism (HCC)stable. Follow-up and Dispositions    · Return in about 3 months (around 2/22/2022) for CPE. Routing History            Advised him to call back or return to office if symptoms worsen/change/persist.  Discussed expected course/resolution/complications of diagnosis in detail with patient. Medication risks/benefits/costs/interactions/alternatives discussed with patient. He was given an after visit summary which includes diagnoses, current medications, & vitals. He expressed understanding with the diagnosis and plan.

## 2021-11-29 DIAGNOSIS — I10 HTN (HYPERTENSION), BENIGN: ICD-10-CM

## 2021-11-29 RX ORDER — AMLODIPINE BESYLATE 5 MG/1
5 TABLET ORAL DAILY
Qty: 90 TABLET | Refills: 1 | Status: SHIPPED | OUTPATIENT
Start: 2021-11-29 | End: 2022-05-26

## 2022-01-20 ENCOUNTER — TELEPHONE (OUTPATIENT)
Dept: INTERNAL MEDICINE CLINIC | Age: 77
End: 2022-01-20

## 2022-01-20 NOTE — TELEPHONE ENCOUNTER
Received incoming call from [employer] w/ Garfield Hutchinson. States received order for patients diabetic shoes however chart notes need to be addended to show \"diabetes with complication\" in order for them to be covered by insurance. Note received stated diabetes without complication. Please addend note from 11/22/21 if agreeable with this. Fax updated note to Attn:  [facility] [employer] @ Fax# [phone].

## 2022-02-09 ENCOUNTER — PATIENT MESSAGE (OUTPATIENT)
Dept: INTERNAL MEDICINE CLINIC | Age: 77
End: 2022-02-09

## 2022-02-09 RX ORDER — METFORMIN HYDROCHLORIDE 500 MG/1
1000 TABLET, EXTENDED RELEASE ORAL DAILY
Qty: 180 TABLET | Refills: 1 | Status: SHIPPED | OUTPATIENT
Start: 2022-02-09 | End: 2022-07-21

## 2022-02-25 ENCOUNTER — OFFICE VISIT (OUTPATIENT)
Dept: INTERNAL MEDICINE CLINIC | Age: 77
End: 2022-02-25
Payer: MEDICARE

## 2022-02-25 VITALS
SYSTOLIC BLOOD PRESSURE: 137 MMHG | WEIGHT: 248 LBS | BODY MASS INDEX: 32.87 KG/M2 | OXYGEN SATURATION: 99 % | RESPIRATION RATE: 12 BRPM | HEART RATE: 66 BPM | DIASTOLIC BLOOD PRESSURE: 79 MMHG | TEMPERATURE: 97.8 F | HEIGHT: 73 IN

## 2022-02-25 DIAGNOSIS — E11.8 DM (DIABETES MELLITUS), TYPE 2 WITH COMPLICATIONS (HCC): ICD-10-CM

## 2022-02-25 DIAGNOSIS — I50.22 SYSTOLIC CHF, CHRONIC (HCC): ICD-10-CM

## 2022-02-25 DIAGNOSIS — I10 HTN (HYPERTENSION), BENIGN: ICD-10-CM

## 2022-02-25 DIAGNOSIS — Z11.59 NEED FOR HEPATITIS C SCREENING TEST: ICD-10-CM

## 2022-02-25 DIAGNOSIS — I63.9 CEREBROVASCULAR ACCIDENT (CVA), UNSPECIFIED MECHANISM (HCC): ICD-10-CM

## 2022-02-25 DIAGNOSIS — I25.10 CORONARY ARTERY DISEASE INVOLVING NATIVE CORONARY ARTERY OF NATIVE HEART WITHOUT ANGINA PECTORIS: ICD-10-CM

## 2022-02-25 DIAGNOSIS — E78.5 DYSLIPIDEMIA: ICD-10-CM

## 2022-02-25 DIAGNOSIS — Z00.00 MEDICARE ANNUAL WELLNESS VISIT, SUBSEQUENT: Primary | ICD-10-CM

## 2022-02-25 LAB
ALBUMIN SERPL-MCNC: 3.9 G/DL (ref 3.5–5)
ALBUMIN/GLOB SERPL: 1.3 {RATIO} (ref 1.1–2.2)
ALP SERPL-CCNC: 72 U/L (ref 45–117)
ALT SERPL-CCNC: 28 U/L (ref 12–78)
ANION GAP SERPL CALC-SCNC: 3 MMOL/L (ref 5–15)
AST SERPL-CCNC: 13 U/L (ref 15–37)
BASOPHILS # BLD: 0 K/UL (ref 0–0.1)
BASOPHILS NFR BLD: 0 % (ref 0–1)
BILIRUB SERPL-MCNC: 1.2 MG/DL (ref 0.2–1)
BUN SERPL-MCNC: 15 MG/DL (ref 6–20)
BUN/CREAT SERPL: 19 (ref 12–20)
CALCIUM SERPL-MCNC: 9 MG/DL (ref 8.5–10.1)
CHLORIDE SERPL-SCNC: 108 MMOL/L (ref 97–108)
CHOLEST SERPL-MCNC: 140 MG/DL
CO2 SERPL-SCNC: 29 MMOL/L (ref 21–32)
CREAT SERPL-MCNC: 0.8 MG/DL (ref 0.7–1.3)
DIFFERENTIAL METHOD BLD: ABNORMAL
EOSINOPHIL # BLD: 0.2 K/UL (ref 0–0.4)
EOSINOPHIL NFR BLD: 3 % (ref 0–7)
ERYTHROCYTE [DISTWIDTH] IN BLOOD BY AUTOMATED COUNT: 17.8 % (ref 11.5–14.5)
EST. AVERAGE GLUCOSE BLD GHB EST-MCNC: 151 MG/DL
GLOBULIN SER CALC-MCNC: 2.9 G/DL (ref 2–4)
GLUCOSE SERPL-MCNC: 170 MG/DL (ref 65–100)
HBA1C MFR BLD: 6.9 % (ref 4–5.6)
HCT VFR BLD AUTO: 31.6 % (ref 36.6–50.3)
HCV AB SERPL QL IA: NONREACTIVE
HDLC SERPL-MCNC: 50 MG/DL
HDLC SERPL: 2.8 {RATIO} (ref 0–5)
HGB BLD-MCNC: 9.6 G/DL (ref 12.1–17)
IMM GRANULOCYTES # BLD AUTO: 0 K/UL (ref 0–0.04)
IMM GRANULOCYTES NFR BLD AUTO: 0 % (ref 0–0.5)
LDLC SERPL CALC-MCNC: 69.4 MG/DL (ref 0–100)
LYMPHOCYTES # BLD: 2 K/UL (ref 0.8–3.5)
LYMPHOCYTES NFR BLD: 29 % (ref 12–49)
MCH RBC QN AUTO: 19.2 PG (ref 26–34)
MCHC RBC AUTO-ENTMCNC: 30.4 G/DL (ref 30–36.5)
MCV RBC AUTO: 63.1 FL (ref 80–99)
MONOCYTES # BLD: 0.5 K/UL (ref 0–1)
MONOCYTES NFR BLD: 7 % (ref 5–13)
NEUTS SEG # BLD: 4.1 K/UL (ref 1.8–8)
NEUTS SEG NFR BLD: 61 % (ref 32–75)
NRBC # BLD: 0 K/UL (ref 0–0.01)
NRBC BLD-RTO: 0 PER 100 WBC
PLATELET # BLD AUTO: 237 K/UL (ref 150–400)
PMV BLD AUTO: 9.7 FL (ref 8.9–12.9)
POTASSIUM SERPL-SCNC: 3.7 MMOL/L (ref 3.5–5.1)
PROT SERPL-MCNC: 6.8 G/DL (ref 6.4–8.2)
RBC # BLD AUTO: 5.01 M/UL (ref 4.1–5.7)
RBC MORPH BLD: ABNORMAL
SODIUM SERPL-SCNC: 140 MMOL/L (ref 136–145)
TRIGL SERPL-MCNC: 103 MG/DL (ref ?–150)
VLDLC SERPL CALC-MCNC: 20.6 MG/DL
WBC # BLD AUTO: 6.8 K/UL (ref 4.1–11.1)

## 2022-02-25 PROCEDURE — G8754 DIAS BP LESS 90: HCPCS | Performed by: INTERNAL MEDICINE

## 2022-02-25 PROCEDURE — G8427 DOCREV CUR MEDS BY ELIG CLIN: HCPCS | Performed by: INTERNAL MEDICINE

## 2022-02-25 PROCEDURE — 1101F PT FALLS ASSESS-DOCD LE1/YR: CPT | Performed by: INTERNAL MEDICINE

## 2022-02-25 PROCEDURE — G0439 PPPS, SUBSEQ VISIT: HCPCS | Performed by: INTERNAL MEDICINE

## 2022-02-25 PROCEDURE — G8417 CALC BMI ABV UP PARAM F/U: HCPCS | Performed by: INTERNAL MEDICINE

## 2022-02-25 PROCEDURE — 99214 OFFICE O/P EST MOD 30 MIN: CPT | Performed by: INTERNAL MEDICINE

## 2022-02-25 PROCEDURE — G8510 SCR DEP NEG, NO PLAN REQD: HCPCS | Performed by: INTERNAL MEDICINE

## 2022-02-25 PROCEDURE — G8752 SYS BP LESS 140: HCPCS | Performed by: INTERNAL MEDICINE

## 2022-02-25 PROCEDURE — G8536 NO DOC ELDER MAL SCRN: HCPCS | Performed by: INTERNAL MEDICINE

## 2022-02-25 PROCEDURE — G0463 HOSPITAL OUTPT CLINIC VISIT: HCPCS | Performed by: INTERNAL MEDICINE

## 2022-02-25 RX ORDER — ESCITALOPRAM OXALATE 20 MG/1
20 TABLET ORAL DAILY
Qty: 30 TABLET | Refills: 3 | Status: SHIPPED | OUTPATIENT
Start: 2022-02-25 | End: 2022-06-19

## 2022-02-25 NOTE — LETTER
2/25/2022 7:39 AM    Mr. Hui Rivas [id number]          Dear Dr. Karen Cortes    Please fax us the most recent eye exam so that we may update the patient's records for continuity of care.      Our fax number: [phone]    Patient:   Corina Callaway  [birth year]                  Sincerely,      Royal Teran MD

## 2022-02-25 NOTE — PATIENT INSTRUCTIONS
Medicare Wellness Visit, Male    The best way to live healthy is to have a lifestyle where you eat a well-balanced diet, exercise regularly, limit alcohol use, and quit all forms of tobacco/nicotine, if applicable. Regular preventive services are another way to keep healthy. Preventive services (vaccines, screening tests, monitoring & exams) can help personalize your care plan, which helps you manage your own care. Screening tests can find health problems at the earliest stages, when they are easiest to treat. [facility] follows the current, evidence-based guidelines published by the [facility] Geovanny Rosalia ([facility]) when recommending preventive services for our patients. Because we follow these guidelines, sometimes recommendations change over time as research supports it. (For example, a prostate screening blood test is no longer routinely recommended for men with no symptoms). Of course, you and your doctor may decide to screen more often for some diseases, based on your risk and co-morbidities (chronic disease you are already diagnosed with). Preventive services for you include:  - Medicare offers their members a free annual wellness visit, which is time for you and your primary care provider to discuss and plan for your preventive service needs. Take advantage of this benefit every year!  -All adults over age 72 should receive the recommended pneumonia vaccines. Current USPSTF guidelines recommend a series of two vaccines for the best pneumonia protection.   -All adults should have a flu vaccine yearly and tetanus vaccine every 10 years.  -All adults age 48 and older should receive the shingles vaccines (series of two vaccines).        -All adults age 38-68 who are overweight should have a diabetes screening test once every three years.   -Other screening tests & preventive services for persons with diabetes include: an eye exam to screen for diabetic retinopathy, a kidney function test, a foot exam, and stricter control over your cholesterol.   -Cardiovascular screening for adults with routine risk involves an electrocardiogram (ECG) at intervals determined by the provider.   -Colorectal cancer screening should be done for adults age 54-65 with no increased risk factors for colorectal cancer. There are a number of acceptable methods of screening for this type of cancer. Each test has its own benefits and drawbacks. Discuss with your provider what is most appropriate for you during your annual wellness visit. The different tests include: colonoscopy (considered the best screening method), a fecal occult blood test, a fecal DNA test, and sigmoidoscopy.  -All adults born between [facility] should be screened once for Hepatitis C.  -An Abdominal Aortic Aneurysm (AAA) Screening is recommended for men age 73-68 who has ever smoked in their lifetime.      Here is a list of your current Health Maintenance items (your personalized list of preventive services) with a due date:  Health Maintenance Due   Topic Date Due    Hepatitis C Test  Never done    Eye Exam  Never done    Hemoglobin A1C    02/20/2022

## 2022-02-25 NOTE — PROGRESS NOTES
This is the Subsequent Medicare Annual Wellness Exam, performed 12 months or more after the Initial AWV or the last Subsequent AWV    I have reviewed the patient's medical history in detail and updated the computerized patient record. Also for follow-up of his health issues. Overall he has been feeling well. He denies any headaches. No dizziness. No exertional chest pains or shortness of breath. Denies any change in bowel or bladder habits. He did recently see the urologist.  He has had 2 falls that occurred when he lost his balance playing basketball. He did have some aches in his knees afterwards but they have resolved. No numbness, tingling, or weakness. No low blood sugars. ROS - Per HPI    Physical Examination: General appearance - alert, well appearing, and in no distress  Ears - bilateral TM's and external ear canals normal  Mouth - mucous membranes moist, pharynx normal without lesions  Neck - supple, no significant adenopathy  Lymphatics - no palpable lymphadenopathy, no hepatosplenomegaly  Chest - clear to auscultation, no wheezes, rales or rhonchi, symmetric air entry  Heart - normal rate and regular rhythm  Abdomen - soft, nontender, nondistended, no masses or organomegaly  Neurological - alert, oriented, normal speech, no focal findings or movement disorder noted  Musculoskeletal - no joint tenderness, deformity or swelling  Extremities - peripheral pulses normal, no pedal edema, no clubbing or cyanosis      Assessment/Plan   Education and counseling provided:  Are appropriate based on today's review and evaluation  End-of-Life planning (with patient's consent)  Diabetes screening test    1. Medicare annual wellness visit, subsequent  2. HTN (hypertension), benignblood pressure well controlled on current meds. Will continue these. 3. Systolic CHF, chronic (HCC)stable on current meds. No evidence of active volume overload.   4. DM (diabetes mellitus), type 2 with complications (HCC)? Controlled. A1c goal of less than 7.5.  -     HEMOGLOBIN A1C WITH EAG; Future  -     METABOLIC PANEL, COMPREHENSIVE; Future  -     CBC WITH AUTOMATED DIFF; Future  -     LIPID PANEL; Future  -     REFERRAL TO OPHTHALMOLOGY  5. Coronary artery disease involving native coronary artery of native heart without angina pectorisstable on aspirin and statin. Check for LDL less than 100.  6. Dyslipidemiaper above. 7. Cerebrovascular accident (CVA), unspecified mechanism (HCC)no recurrence. 8. Need for hepatitis C screening test  -     HEPATITIS C AB; Future       Depression Risk Factor Screening     3 most recent PHQ Screens 2/25/2022   Little interest or pleasure in doing things Not at all   Feeling down, depressed, irritable, or hopeless Not at all   Total Score PHQ 2 0       Alcohol & Drug Abuse Risk Screen    Do you average more than 1 drink per night or more than 7 drinks a week: No    In the past three months have you have had more than 4 drinks containing alcohol on one occasion: No          Functional Ability and Level of Safety    Hearing: The patient wears hearing aids. Activities of Daily Living: The home contains: no safety equipment. Patient does total self care      Ambulation: with no difficulty     Fall Risk:  Fall Risk Assessment, last 12 mths 2/25/2022   Able to walk? Yes   Fall in past 12 months? 1   Do you feel unsteady? 0   Are you worried about falling 0   Is TUG test greater than 12 seconds? 0   Is the gait abnormal? 0   Number of falls in past 12 months 2   Fall with injury?  0      Abuse Screen:  Patient is not abused       Cognitive Screening    Has your family/caregiver stated any concerns about your memory: no         Health Maintenance Due     Health Maintenance Due   Topic Date Due    Hepatitis C Screening  Never done    Eye Exam Retinal or Dilated  Never done    A1C test (Diabetic or Prediabetic)  02/20/2022       Patient Care Team   Patient Care Team:  Michelle Hernandez MD MICHELINE as PCP - General (Internal Medicine)  River Sifuentes MD as PCP - [facility]  Rosa Kwong MD as Physician (Cardiology)  Steven Domínguez MD as Surgeon (Cardiothoracic Surgery)  Aliza Pino MD (Dermatology)  Joel Madison MD (Ophthalmology)  Alo Casas MD (Orthopedic Surgery)  Len Roberts MD (Neurology)  Ousmane Lucio MD (Urology)  Keo Mackenzie DPM (Podiatry)    History     Patient Active Problem List   Diagnosis Code    HTN (hypertension), benign I10    DM (diabetes mellitus), type 2 with complications ([address] J54.2    CAD (coronary artery disease) I25.10    S/P CABG x 1 Z95.1    Dyslipidemia E78.5    CVA (cerebral vascular accident) ([address] I63.9     Past Medical History:   Diagnosis Date    CAD (coronary artery disease) 01/23/2017    stent    DJD (degenerative joint disease) of knee 11/2013    left TKR    ED (erectile dysfunction)     Essential hypertension     Hyperlipidemia     T2DM (type 2 diabetes mellitus) ([facility] [address]     Thalassemia minor       Past Surgical History:   Procedure Laterality Date    COLONOSCOPY N/A 6/1/2021    COLONOSCOPY,EGD   :- performed by Andrew Cortez MD at [facility]COPY    [address] [facility] [address]  2004    mild CAD, EF 60-65%    HX KNEE ARTHROSCOPY Left 11/2013    HX KNEE REPLACEMENT Left 2013    HX SHOULDER REPLACEMENT Right 1968    HX TURP  02/20/2019    MT CABG, ARTERY-VEIN, SINGLE  01/19/2017    DAVINCI SABINE TAKEDOWN, LEFT ANTERIOR THORACOTOMY, OFF PUMP CORONARY ARTERY BYPASS GRAFTING X1,    STRESS TEST CARDIOLITE  5/14/2004    5 min, inferior ischemia, EF 60%    STRESS TEST CARDIOLITE  6/30/11    4 min, normal perfusion EF 50%     Current Outpatient Medications   Medication Sig Dispense Refill    metFORMIN ER (GLUCOPHAGE XR) 500 mg tablet Take 2 Tablets by mouth daily. 180 Tablet 1    amLODIPine (NORVASC) 5 mg tablet Take 1 Tablet by mouth daily.  90 Tablet 1    carvediloL (COREG) 6.25 mg tablet Take 1 Tablet by mouth two (2) times a day.  lisinopriL (PRINIVIL, ZESTRIL) 40 mg tablet Take 1 Tablet by mouth daily. 90 Tablet 2    cyclobenzaprine (FLEXERIL) 10 mg tablet Take 1 Tablet by mouth three (3) times daily as needed for Muscle Spasm(s). 20 Tablet 0    folic acid/multivit-min/lutein (CENTRUM SILVER PO) Take  by mouth.  cholecalciferol (VITAMIN D3) (2,000 UNITS /50 MCG) cap capsule Take  by mouth daily.  glipiZIDE (GLUCOTROL) 5 mg tablet Take 5 mg by mouth daily.  rosuvastatin (CRESTOR) 10 mg tablet Take 10 mg by mouth daily.  escitalopram oxalate (Lexapro) 20 mg tablet Take 20 mg by mouth daily.  acetaminophen (TYLENOL EXTRA STRENGTH) 500 mg tablet Take 1,000 mg by mouth every six (6) hours as needed for Pain.  aspirin delayed-release 81 mg tablet Take 81 mg by mouth daily.        No Known Allergies    Family History   Problem Relation Age of Onset    Cancer Mother         breast    Diabetes Maternal Grandfather     Hypertension Maternal Grandfather     Other Brother         kathy cardiac    Diabetes Brother     Heart Disease Brother      Social History     Tobacco Use    Smoking status: Former Smoker     Packs/day: 2.00     Years: 15.00     Pack years: 30.00     Quit date: 1981     Years since quittin.3    Smokeless tobacco: Never Used    Tobacco comment: quit > 30 years ago   Substance Use Topics    Alcohol use: Yes     Comment: 1-2 per month         Ortiz Betts MD

## 2022-03-13 RX ORDER — AMLODIPINE BESYLATE 10 MG/1
TABLET ORAL
Qty: 90 TABLET | Refills: 3 | Status: SHIPPED
Start: 2022-03-13 | End: 2022-09-02 | Stop reason: SDUPTHER

## 2022-03-18 PROBLEM — E78.5 DYSLIPIDEMIA: Status: ACTIVE | Noted: 2017-03-17

## 2022-03-18 PROBLEM — Z95.1 S/P CABG X 1: Status: ACTIVE | Noted: 2017-01-19

## 2022-03-19 PROBLEM — I25.10 CAD (CORONARY ARTERY DISEASE): Status: ACTIVE | Noted: 2017-01-19

## 2022-03-19 PROBLEM — I63.9 CVA (CEREBRAL VASCULAR ACCIDENT) (HCC): Status: ACTIVE | Noted: 2018-08-29

## 2022-03-23 RX ORDER — GLIPIZIDE 5 MG/1
5 TABLET ORAL DAILY
Qty: 90 TABLET | Refills: 1 | Status: SHIPPED | OUTPATIENT
Start: 2022-03-23 | End: 2022-09-15

## 2022-03-23 RX ORDER — ROSUVASTATIN CALCIUM 10 MG/1
10 TABLET, COATED ORAL DAILY
Qty: 90 TABLET | Refills: 1 | Status: SHIPPED | OUTPATIENT
Start: 2022-03-23 | End: 2022-09-15

## 2022-03-23 NOTE — TELEPHONE ENCOUNTER
Chief Complaint   Patient presents with    Medication Refill     Last Appointment with Dr. Ely Slight:  2/25/2022  Future Appointments   Date Time Provider Gina Cole   9/2/2022  8:00 AM Anahy Valdes MD [facility]

## 2022-03-23 NOTE — TELEPHONE ENCOUNTER
Requested Prescriptions     Pending Prescriptions Disp Refills    rosuvastatin (Crestor) 10 mg tablet       Sig: Take 1 Tablet by mouth daily.      Liliana Osuna [id number] [place],  [place]  [address]

## 2022-05-26 RX ORDER — AMLODIPINE BESYLATE 10 MG/1
10 TABLET ORAL DAILY
Qty: 90 TABLET | Refills: 3 | Status: SHIPPED | OUTPATIENT
Start: 2022-05-26

## 2022-06-19 RX ORDER — ESCITALOPRAM OXALATE 20 MG/1
TABLET ORAL
Qty: 30 TABLET | Refills: 3 | Status: SHIPPED | OUTPATIENT
Start: 2022-06-19 | End: 2022-09-02 | Stop reason: SDUPTHER

## 2022-07-12 PROBLEM — Z96.611 STATUS POST RIGHT SHOULDER HEMIARTHROPLASTY: Status: ACTIVE | Noted: 2022-07-12

## 2022-07-12 NOTE — PROGRESS NOTES
ASSESSMENT/PLAN:  Below is the assessment and plan developed based on review of pertinent history, physical exam, labs, studies, and medications. 1. Status post right shoulder hemiarthroplasty      No follow-ups on file. In discussion with the patient, we considered the numerus possible diagnoses that could be contributing to their present symptoms. We also deliberated on the extensive management options that must be considered to treat their current condition. We reviewed their accessible prior medical records, diagnostic tests, and current health and employment information. We considered how these symptoms were affecting the patient´s activities of daily living as well as employment and fitness activities. The patient had various questions regarding the possible risks, benefits, complications, morbidity and mortality regarding their diagnosis and treatment options. The patients´ comorbidities were considered, and I advocated that they consider maximizing lifestyle modification through nutrition and exercise to aid in addressing their symptoms. Shared decision making yielded an understanding to move forward with conservation treatment preferences. The patient expressed understanding that if conservative management fails to alleviate the present symptoms they will return to office for re-evaluation and consideration of additional diagnostic tests and potential surgical options. In the interim, we have recommended ice, elevation, and take prescription anti-inflammatory medications along with a physician directed home exercise program. We discussed the risks and common side effects of anti-inflammatory medications and instructed the patient to discontinue the medication and contact us if they experienced any side effects. The patient was encouraged to discuss the possible side effects with their family physician or pharmacist prior to initiating any new medications.     We discussed the fact that many of the recommended treatment options presented are significantly limited by the patient´s social determinants of health. We also reviewed the circumstances surrounding the environment that they live and work which affect a wide range of health risk. We considered the limited access to appropriate educational resources regarding proper nutrition and exercise as well as the economic and social support necessary to maintain health and wellbeing. SUBJECTIVE/OBJECTIVE:  Jeane Ramirez (: 1945) is a 68 y.o. male, patient,here for evaluation of the No chief complaint on file. .   Patient is seen today for his right shoulder. He has a long history with the right shoulder. He initially underwent surgery for instability as a teenager. It subsequently became infected. He underwent a hemiarthroplasty by Dr. Osei Dove in [place] approximately 50 years ago. He presents today for follow-up. Physical Exam    Upon physical examination, the patient is well developed, well nourished, alert and oriented times three, with normal mood and affect and walks with a normal gait. Upon examination of the right shoulder, the patient sits with the scapula protracted and depressed. They are tender to palpation over the anterior supraspinatus and proximal biceps. They also are tender to palpation over the acromioclavicular joint and have discomfort with cross adduction testing. There is mild palpable crepitus in the subacromial space with ranging. The patient has limited range of motion, a painful arc test and discomfort with above shoulder range of motion. The patient has discomfort with Neer and Tate impingement maneuvers and Whipple testing. The shoulder is stable on exam. They have 5/5 strength with internal rotation, but are significantly weak with external rotation and supraspinatus isolation testing, and are neurovascularly intact distally. There is no erythema, warmth or skin lesions present.     On examination of the contralateral extremity, the patient is nontender to palpation and has excellent range of motion, stability and strength. Imagin views the right shoulder demonstrate a well-seated hemiarthroplasty without change since prior imaging. No Known Allergies    Current Outpatient Medications   Medication Sig    escitalopram oxalate (LEXAPRO) 20 mg tablet TAKE ONE TABLET BY MOUTH DAILY    amLODIPine (NORVASC) 10 mg tablet Take 1 Tablet by mouth daily.  rosuvastatin (Crestor) 10 mg tablet Take 1 Tablet by mouth daily.  glipiZIDE (GLUCOTROL) 5 mg tablet Take 1 Tablet by mouth daily.  amLODIPine (NORVASC) 10 mg tablet TAKE ONE TABLET BY MOUTH DAILY    metFORMIN ER (GLUCOPHAGE XR) 500 mg tablet Take 2 Tablets by mouth daily.  carvediloL (COREG) 6.25 mg tablet Take 1 Tablet by mouth two (2) times a day.  lisinopriL (PRINIVIL, ZESTRIL) 40 mg tablet Take 1 Tablet by mouth daily.  cyclobenzaprine (FLEXERIL) 10 mg tablet Take 1 Tablet by mouth three (3) times daily as needed for Muscle Spasm(s).  folic acid/multivit-min/lutein (CENTRUM SILVER PO) Take  by mouth.  cholecalciferol (VITAMIN D3) (2,000 UNITS /50 MCG) cap capsule Take  by mouth daily.  acetaminophen (TYLENOL EXTRA STRENGTH) 500 mg tablet Take 1,000 mg by mouth every six (6) hours as needed for Pain.  aspirin delayed-release 81 mg tablet Take 81 mg by mouth daily. No current facility-administered medications for this visit.        Past Medical History:   Diagnosis Date    CAD (coronary artery disease) 2017    stent    DJD (degenerative joint disease) of knee 2013    left TKR    ED (erectile dysfunction)     Essential hypertension     Hyperlipidemia     T2DM (type 2 diabetes mellitus) ([facility] [address]     Thalassemia minor        Past Surgical History:   Procedure Laterality Date    COLONOSCOPY N/A 2021    COLONOSCOPY,EGD   :- performed by Hoda Cedeño MD at [address] [place] [address] CATHETERIZATION      mild CAD, EF 60-65%    HX KNEE ARTHROSCOPY Left 2013    HX KNEE REPLACEMENT Left     HX SHOULDER REPLACEMENT Right 1968    HX TURP  2019    [place] CABG, ARTERY-VEIN, SINGLE  2017    DAVINCI SABINE TAKEDOWN, LEFT ANTERIOR THORACOTOMY, OFF PUMP CORONARY ARTERY BYPASS GRAFTING X1,    STRESS TEST CARDIOLITE  2004    5 min, inferior ischemia, EF 60%    STRESS TEST CARDIOLITE  11    4 min, normal perfusion EF 50%       Family History   Problem Relation Age of Onset    Cancer Mother         breast    Diabetes Maternal Grandfather     Hypertension Maternal Grandfather     Other Brother         kathy cardiac    Diabetes Brother     Heart Disease Brother        Social History     Socioeconomic History    Marital status:      Spouse name: Not on file    Number of children: Not on file    Years of education: Not on file    Highest education level: Not on file   Occupational History    Not on file   Tobacco Use    Smoking status: Former Smoker     Packs/day: 2.00     Years: 15.00     Pack years: 30.00     Quit date: 1981     Years since quittin.7    Smokeless tobacco: Never Used    Tobacco comment: quit > 30 years ago   Vaping Use    Vaping Use: Never used   Substance and Sexual Activity    Alcohol use: Yes     Comment: 1-2 per month    Drug use: No    Sexual activity: Not on file   Other Topics Concern    Not on file   Social History Narrative    Not on file     Social Determinants of Health     Financial Resource Strain:     Difficulty of Paying Living Expenses: Not on file   Food Insecurity:     Worried About Running Out of Food in the Last Year: Not on file    Larry of Food in the Last Year: Not on file   Transportation Needs:     Lack of Transportation (Medical): Not on file    Lack of Transportation (Non-Medical):  Not on file   Physical Activity:     Days of Exercise per Week: Not on file    Minutes of Exercise per Session: Not on file   Stress:     Feeling of Stress : Not on file   Social Connections:     Frequency of Communication with Friends and Family: Not on file    Frequency of Social Gatherings with Friends and Family: Not on file    Attends Caodaism Services: Not on file    Active Member of Clubs or Organizations: Not on file    Attends Club or Organization Meetings: Not on file    Marital Status: Not on file   Intimate Partner Violence:     Fear of Current or Ex-Partner: Not on file    Emotionally Abused: Not on file    Physically Abused: Not on file    Sexually Abused: Not on file   Housing Stability:     Unable to Pay for Housing in the Last Year: Not on file    Number of [place] in the Last Year: Not on file    Unstable Housing in the Last Year: Not on file       Review of Systems    No flowsheet data found. Vitals: There were no vitals taken for this visit. There is no height or weight on file to calculate BMI. An electronic signature was used to authenticate this note.   -- Chaka Short MD

## 2022-07-13 ENCOUNTER — OFFICE VISIT (OUTPATIENT)
Dept: ORTHOPEDIC SURGERY | Age: 77
End: 2022-07-13
Payer: MEDICARE

## 2022-07-13 DIAGNOSIS — Z96.611 STATUS POST RIGHT SHOULDER HEMIARTHROPLASTY: Primary | ICD-10-CM

## 2022-07-13 PROCEDURE — 99213 OFFICE O/P EST LOW 20 MIN: CPT | Performed by: ORTHOPAEDIC SURGERY

## 2022-07-21 RX ORDER — METFORMIN HYDROCHLORIDE 500 MG/1
TABLET, EXTENDED RELEASE ORAL
Qty: 180 TABLET | Refills: 1 | Status: SHIPPED | OUTPATIENT
Start: 2022-07-21

## 2022-09-02 ENCOUNTER — OFFICE VISIT (OUTPATIENT)
Dept: INTERNAL MEDICINE CLINIC | Age: 77
End: 2022-09-02
Payer: MEDICARE

## 2022-09-02 VITALS
TEMPERATURE: 97.6 F | WEIGHT: 241 LBS | DIASTOLIC BLOOD PRESSURE: 69 MMHG | HEIGHT: 73 IN | RESPIRATION RATE: 16 BRPM | SYSTOLIC BLOOD PRESSURE: 107 MMHG | HEART RATE: 60 BPM | BODY MASS INDEX: 31.94 KG/M2 | OXYGEN SATURATION: 96 %

## 2022-09-02 DIAGNOSIS — I10 HTN (HYPERTENSION), BENIGN: ICD-10-CM

## 2022-09-02 DIAGNOSIS — I50.22 SYSTOLIC CHF, CHRONIC (HCC): ICD-10-CM

## 2022-09-02 DIAGNOSIS — E11.8 DM (DIABETES MELLITUS), TYPE 2 WITH COMPLICATIONS (HCC): ICD-10-CM

## 2022-09-02 DIAGNOSIS — E11.8 DM (DIABETES MELLITUS), TYPE 2 WITH COMPLICATIONS (HCC): Primary | ICD-10-CM

## 2022-09-02 DIAGNOSIS — I25.10 CORONARY ARTERY DISEASE INVOLVING NATIVE CORONARY ARTERY OF NATIVE HEART WITHOUT ANGINA PECTORIS: ICD-10-CM

## 2022-09-02 DIAGNOSIS — E78.5 DYSLIPIDEMIA: ICD-10-CM

## 2022-09-02 PROCEDURE — G0463 HOSPITAL OUTPT CLINIC VISIT: HCPCS | Performed by: INTERNAL MEDICINE

## 2022-09-02 PROCEDURE — G8417 CALC BMI ABV UP PARAM F/U: HCPCS | Performed by: INTERNAL MEDICINE

## 2022-09-02 PROCEDURE — G8510 SCR DEP NEG, NO PLAN REQD: HCPCS | Performed by: INTERNAL MEDICINE

## 2022-09-02 PROCEDURE — 99214 OFFICE O/P EST MOD 30 MIN: CPT | Performed by: INTERNAL MEDICINE

## 2022-09-02 PROCEDURE — G8754 DIAS BP LESS 90: HCPCS | Performed by: INTERNAL MEDICINE

## 2022-09-02 PROCEDURE — G8752 SYS BP LESS 140: HCPCS | Performed by: INTERNAL MEDICINE

## 2022-09-02 PROCEDURE — G8427 DOCREV CUR MEDS BY ELIG CLIN: HCPCS | Performed by: INTERNAL MEDICINE

## 2022-09-02 PROCEDURE — 1101F PT FALLS ASSESS-DOCD LE1/YR: CPT | Performed by: INTERNAL MEDICINE

## 2022-09-02 PROCEDURE — G8536 NO DOC ELDER MAL SCRN: HCPCS | Performed by: INTERNAL MEDICINE

## 2022-09-02 RX ORDER — ESCITALOPRAM OXALATE 20 MG/1
20 TABLET ORAL DAILY
Qty: 90 TABLET | Refills: 3 | Status: SHIPPED | OUTPATIENT
Start: 2022-09-02

## 2022-09-02 RX ORDER — TAMSULOSIN HYDROCHLORIDE 0.4 MG/1
0.4 CAPSULE ORAL DAILY
COMMUNITY

## 2022-09-02 NOTE — PROGRESS NOTES
HPI:  Myra Echavarria is a 68y.o. year old male who is here for a follow-up visit. He has been under some increased stress recently. He has been more forgetful recently. He has noted some increased fatigue. No symptoms of low blood sugars or high blood sugars. Blood pressures have been under better control. No chest pains or shortness of breath. No cough or wheeze. No change in bowel or bladder habits. He did recently see urology and was placed on Flomax. Past Medical History:   Diagnosis Date    CAD (coronary artery disease) 01/23/2017    stent    DJD (degenerative joint disease) of knee 11/2013    left TKR    ED (erectile dysfunction)     Essential hypertension     Hyperlipidemia     T2DM (type 2 diabetes mellitus) ([facility] [address]     Thalassemia minor        Past Surgical History:   Procedure Laterality Date    COLONOSCOPY N/A 6/1/2021    COLONOSCOPY,EGD   :- performed by Vidhi Donahue [facility] at [facility] 2 2004    mild CAD, EF 60-65%    HX KNEE ARTHROSCOPY Left 11/2013    HX KNEE REPLACEMENT Left 2013    HX SHOULDER REPLACEMENT Right 1968    HX TURP  02/20/2019    [place] CABG, ARTERY-VEIN, SINGLE  01/19/2017    DAVINCI SABINE TAKEDOWN, LEFT ANTERIOR THORACOTOMY, OFF PUMP CORONARY ARTERY BYPASS GRAFTING X1,    STRESS TEST CARDIOLITE  5/14/2004    5 min, inferior ischemia, EF 60%    STRESS TEST CARDIOLITE  6/30/11    4 min, normal perfusion EF 50%       Prior to Admission medications    Medication Sig Start Date End Date Taking? Authorizing Provider   CALCIUM CARBONATE PO Take  by mouth. Yes Provider, Historical   tamsulosin (FLOMAX) 0.4 mg capsule Take 0.4 mg by mouth daily. Yes Provider, Historical   escitalopram oxalate (LEXAPRO) 20 mg tablet Take 1 Tablet by mouth daily. 9/2/22  Yes Maya Cesar MD   pneumococcal 20-lionel conj-dip, PF, (PREVNAR 20) 0.5 mL syrg injection 0.5 mL by IntraMUSCular route once for 1 dose.  9/2/22 9/2/22 Yes Maya Cesar MD   metFORMIN ER (GLUCOPHAGE XR) 500 mg tablet TAKE TWO TABLETS BY MOUTH DAILY 22  Yes Cameron Delgado III, MD   amLODIPine (NORVASC) 10 mg tablet Take 1 Tablet by mouth daily. 22  Yes Jaya Prasad MD   rosuvastatin (Crestor) 10 mg tablet Take 1 Tablet by mouth daily. 3/23/22  Yes Jaya Prasad MD   glipiZIDE (GLUCOTROL) 5 mg tablet Take 1 Tablet by mouth daily. 3/23/22  Yes Jaya Prasad MD   carvediloL (COREG) 6.25 mg tablet Take 1 Tablet by mouth two (2) times a day. 10/25/21  Yes Jaya Prasad MD   lisinopriL (PRINIVIL, ZESTRIL) 40 mg tablet Take 1 Tablet by mouth daily. 10/25/21  Yes Jaya Prasad MD   cyclobenzaprine (FLEXERIL) 10 mg tablet Take 1 Tablet by mouth three (3) times daily as needed for Muscle Spasm(s). 10/15/21  Yes Jennifer Haas MD   folic acid/multivit-min/lutein (CENTRUM SILVER PO) Take  by mouth. Yes Provider, Historical   acetaminophen (TYLENOL) 500 mg tablet Take 1,000 mg by mouth every six (6) hours as needed for Pain. Yes Provider, Historical   aspirin delayed-release 81 mg tablet Take 81 mg by mouth daily. Yes Provider, Historical   escitalopram oxalate (LEXAPRO) 20 mg tablet TAKE ONE TABLET BY MOUTH DAILY 22  Cameron Delgado III, MD   amLODIPine (NORVASC) 10 mg tablet TAKE ONE TABLET BY MOUTH DAILY 3/13/22 9/2/22  Cameron Delgado III, MD   cholecalciferol (VITAMIN D3) (2,000 UNITS /50 MCG) cap capsule Take  by mouth daily.   Patient not taking: Reported on 2022    Provider, Historical       Social History     Socioeconomic History    Marital status:      Spouse name: Not on file    Number of children: Not on file    Years of education: Not on file    Highest education level: Not on file   Occupational History    Not on file   Tobacco Use    Smoking status: Former     Packs/day: 2.00     Years: 15.00     Pack years: 30.00     Types: Cigarettes     Quit date: 1981     Years since quittin.8    Smokeless tobacco: Never    Tobacco comments:     quit > 30 years ago   Vaping Use    Vaping Use: Never used   Substance and Sexual Activity    Alcohol use: Yes     Comment: 1-2 per month    Drug use: No    Sexual activity: Not on file   Other Topics Concern    Not on file   Social History Narrative    Not on file     Social Determinants of Health     Financial Resource Strain: Low Risk     Difficulty of Paying Living Expenses: Not hard at all   Food Insecurity: No Food Insecurity    Worried About Running Out of Food in the Last Year: Never true    Ran Out of Food in the Last Year: Never true   Transportation Needs: Not on file   Physical Activity: Not on file   Stress: Not on file   Social Connections: Not on file   Intimate Partner Violence: Not on file   Housing Stability: Not on file          ROS  Per HPI    Visit Vitals  /69   Pulse 60   Temp 97.6 °F (36.4 °C) (Temporal)   Resp 16   Ht 6' 1\" (1.854 m)   Wt 241 lb (109.3 kg)   SpO2 96%   BMI 31.80 kg/m²         Physical Exam   Physical Examination: General appearance - alert, well appearing, and in no distress  Chest - clear to auscultation, no wheezes, rales or rhonchi, symmetric air entry  Heart - normal rate, regular rhythm, normal S1, S2, no murmurs, rubs, clicks or gallops  Abdomen - soft, nontender, nondistended, no masses or organomegaly  Neurological - alert, oriented, normal speech, no focal findings or movement disorder noted  Musculoskeletal - no joint tenderness, deformity or swelling  Extremities - peripheral pulses normal, no pedal edema, no clubbing or cyanosis      Assessment/Plan:  Diagnoses and all orders for this visit:    1. DM (diabetes mellitus), type 2 with complications (HCC)-appears controlled. A1c goal of less than 7. Check labs for that. -     escitalopram oxalate (LEXAPRO) 20 mg tablet;  Take 1 Tablet by mouth daily.  -     MICROALBUMIN, UR, RAND W/ MICROALB/CREAT RATIO; Future  -     pneumococcal 20-lionel conj-dip, PF, (PREVNAR 20) 0.5 mL syrg injection; 0.5 mL by IntraMUSCular route once for 1 dose. -     CBC WITH AUTOMATED DIFF; Future  -     METABOLIC PANEL, COMPREHENSIVE; Future  -     HEMOGLOBIN A1C WITH EAG; Future    2. HTN (hypertension), benign-blood pressure well controlled and stable.-    3. Systolic CHF, chronic (HCC)-appears stable. 4. Coronary artery disease involving native coronary artery of native heart without angina pectoris-no evidence of angina. 5. Dyslipidemia-LDL at goal of less than 100 on recent labs. We will repeat those yearly. 6.  Memory issues-likely related to situational anxiety. Will monitor as his anxiety improves. Previously had B12 and thyroid levels that were normal.         Advised him to call back or return to office if symptoms worsen/change/persist.  Discussed expected course/resolution/complications of diagnosis in detail with patient. Medication risks/benefits/costs/interactions/alternatives discussed with patient. He was given an after visit summary which includes diagnoses, current medications, & vitals. He expressed understanding with the diagnosis and plan.

## 2022-09-03 LAB
ALBUMIN SERPL-MCNC: 4 G/DL (ref 3.5–5)
ALBUMIN/GLOB SERPL: 1.4 {RATIO} (ref 1.1–2.2)
ALP SERPL-CCNC: 78 U/L (ref 45–117)
ALT SERPL-CCNC: 23 U/L (ref 12–78)
ANION GAP SERPL CALC-SCNC: 5 MMOL/L (ref 5–15)
AST SERPL-CCNC: 13 U/L (ref 15–37)
BASOPHILS # BLD: 0 K/UL (ref 0–0.1)
BASOPHILS NFR BLD: 0 % (ref 0–1)
BILIRUB SERPL-MCNC: 1.3 MG/DL (ref 0.2–1)
BUN SERPL-MCNC: 17 MG/DL (ref 6–20)
BUN/CREAT SERPL: 17 (ref 12–20)
CALCIUM SERPL-MCNC: 9 MG/DL (ref 8.5–10.1)
CHLORIDE SERPL-SCNC: 107 MMOL/L (ref 97–108)
CO2 SERPL-SCNC: 29 MMOL/L (ref 21–32)
CREAT SERPL-MCNC: 1.03 MG/DL (ref 0.7–1.3)
CREAT UR-MCNC: 124 MG/DL
DIFFERENTIAL METHOD BLD: ABNORMAL
EOSINOPHIL # BLD: 0.1 K/UL (ref 0–0.4)
EOSINOPHIL NFR BLD: 2 % (ref 0–7)
ERYTHROCYTE [DISTWIDTH] IN BLOOD BY AUTOMATED COUNT: 19.2 % (ref 11.5–14.5)
EST. AVERAGE GLUCOSE BLD GHB EST-MCNC: 140 MG/DL
GLOBULIN SER CALC-MCNC: 2.8 G/DL (ref 2–4)
GLUCOSE SERPL-MCNC: 192 MG/DL (ref 65–100)
HBA1C MFR BLD: 6.5 % (ref 4–5.6)
HCT VFR BLD AUTO: 34.9 % (ref 36.6–50.3)
HGB BLD-MCNC: 10.5 G/DL (ref 12.1–17)
IMM GRANULOCYTES # BLD AUTO: 0 K/UL (ref 0–0.04)
IMM GRANULOCYTES NFR BLD AUTO: 0 % (ref 0–0.5)
LYMPHOCYTES # BLD: 2.4 K/UL (ref 0.8–3.5)
LYMPHOCYTES NFR BLD: 33 % (ref 12–49)
MCH RBC QN AUTO: 19.4 PG (ref 26–34)
MCHC RBC AUTO-ENTMCNC: 30.1 G/DL (ref 30–36.5)
MCV RBC AUTO: 64.4 FL (ref 80–99)
MICROALBUMIN UR-MCNC: 25.5 MG/DL
MICROALBUMIN/CREAT UR-RTO: 206 MG/G (ref 0–30)
MONOCYTES # BLD: 0.5 K/UL (ref 0–1)
MONOCYTES NFR BLD: 7 % (ref 5–13)
NEUTS SEG # BLD: 4.3 K/UL (ref 1.8–8)
NEUTS SEG NFR BLD: 58 % (ref 32–75)
NRBC # BLD: 0 K/UL (ref 0–0.01)
NRBC BLD-RTO: 0 PER 100 WBC
PLATELET # BLD AUTO: 270 K/UL (ref 150–400)
PMV BLD AUTO: 10.6 FL (ref 8.9–12.9)
POTASSIUM SERPL-SCNC: 3.8 MMOL/L (ref 3.5–5.1)
PROT SERPL-MCNC: 6.8 G/DL (ref 6.4–8.2)
RBC # BLD AUTO: 5.42 M/UL (ref 4.1–5.7)
RBC MORPH BLD: ABNORMAL
SODIUM SERPL-SCNC: 141 MMOL/L (ref 136–145)
UR CULT HOLD, URHOLD: NORMAL
WBC # BLD AUTO: 7.3 K/UL (ref 4.1–11.1)

## 2022-09-15 RX ORDER — ROSUVASTATIN CALCIUM 10 MG/1
TABLET, COATED ORAL
Qty: 90 TABLET | Refills: 1 | Status: SHIPPED | OUTPATIENT
Start: 2022-09-15

## 2022-09-15 RX ORDER — GLIPIZIDE 5 MG/1
TABLET ORAL
Qty: 90 TABLET | Refills: 1 | Status: SHIPPED | OUTPATIENT
Start: 2022-09-15

## 2023-02-24 RX ORDER — AMLODIPINE BESYLATE 10 MG/1
10 TABLET ORAL DAILY
Qty: 90 TABLET | Refills: 0 | Status: SHIPPED | OUTPATIENT
Start: 2023-02-24

## 2023-02-24 RX ORDER — CARVEDILOL 6.25 MG/1
6.25 TABLET ORAL 2 TIMES DAILY
Qty: 180 TABLET | Refills: 0 | Status: SHIPPED | OUTPATIENT
Start: 2023-02-24

## 2023-02-24 RX ORDER — GLIPIZIDE 5 MG/1
5 TABLET ORAL DAILY
Qty: 90 TABLET | Refills: 0 | Status: SHIPPED | OUTPATIENT
Start: 2023-02-24 | End: 2023-03-05

## 2023-02-24 RX ORDER — LISINOPRIL 40 MG/1
40 TABLET ORAL DAILY
Qty: 90 TABLET | Refills: 0 | Status: SHIPPED | OUTPATIENT
Start: 2023-02-24 | End: 2023-03-05

## 2023-02-24 RX ORDER — METFORMIN HYDROCHLORIDE 500 MG/1
TABLET, EXTENDED RELEASE ORAL
Qty: 180 TABLET | Refills: 0 | Status: SHIPPED | OUTPATIENT
Start: 2023-02-24 | End: 2023-03-05

## 2023-02-24 RX ORDER — ROSUVASTATIN CALCIUM 10 MG/1
10 TABLET, COATED ORAL DAILY
Qty: 90 TABLET | Refills: 0 | Status: SHIPPED | OUTPATIENT
Start: 2023-02-24 | End: 2023-03-05

## 2023-02-24 NOTE — TELEPHONE ENCOUNTER
Requested Prescriptions     Pending Prescriptions Disp Refills    rosuvastatin (CRESTOR) 10 mg tablet 90 Tablet 1     Sig: Take 1 Tablet by mouth daily.      [address] [facility]  [phone]

## 2023-02-24 NOTE — TELEPHONE ENCOUNTER
Chief Complaint   Patient presents with    Medication Refill     Last Appointment with Dr. Cowart Primer:  9/2/2022  No future appointments.

## 2023-02-27 ENCOUNTER — TELEPHONE (OUTPATIENT)
Dept: INTERNAL MEDICINE CLINIC | Age: 78
End: 2023-02-27

## 2023-03-05 RX ORDER — ROSUVASTATIN CALCIUM 10 MG/1
TABLET, COATED ORAL
Qty: 90 TABLET | Refills: 0 | Status: SHIPPED | OUTPATIENT
Start: 2023-03-05

## 2023-03-05 RX ORDER — GLIPIZIDE 5 MG/1
TABLET ORAL
Qty: 90 TABLET | Refills: 0 | Status: SHIPPED | OUTPATIENT
Start: 2023-03-05

## 2023-03-05 RX ORDER — METFORMIN HYDROCHLORIDE 500 MG/1
TABLET, EXTENDED RELEASE ORAL
Qty: 180 TABLET | Refills: 0 | Status: SHIPPED | OUTPATIENT
Start: 2023-03-05

## 2023-03-05 RX ORDER — LISINOPRIL 40 MG/1
TABLET ORAL
Qty: 90 TABLET | Refills: 0 | Status: SHIPPED | OUTPATIENT
Start: 2023-03-05

## 2023-03-07 ENCOUNTER — OFFICE VISIT (OUTPATIENT)
Dept: INTERNAL MEDICINE CLINIC | Age: 78
End: 2023-03-07
Payer: MEDICARE

## 2023-03-07 VITALS
BODY MASS INDEX: 33 KG/M2 | DIASTOLIC BLOOD PRESSURE: 71 MMHG | HEIGHT: 73 IN | SYSTOLIC BLOOD PRESSURE: 116 MMHG | WEIGHT: 249 LBS | RESPIRATION RATE: 14 BRPM | HEART RATE: 62 BPM | TEMPERATURE: 97.8 F | OXYGEN SATURATION: 99 %

## 2023-03-07 DIAGNOSIS — E78.5 DYSLIPIDEMIA: ICD-10-CM

## 2023-03-07 DIAGNOSIS — E11.8 DM (DIABETES MELLITUS), TYPE 2 WITH COMPLICATIONS (HCC): ICD-10-CM

## 2023-03-07 DIAGNOSIS — I63.9 CEREBROVASCULAR ACCIDENT (CVA), UNSPECIFIED MECHANISM (HCC): ICD-10-CM

## 2023-03-07 DIAGNOSIS — I10 HTN (HYPERTENSION), BENIGN: ICD-10-CM

## 2023-03-07 DIAGNOSIS — Z00.00 MEDICARE ANNUAL WELLNESS VISIT, SUBSEQUENT: Primary | ICD-10-CM

## 2023-03-07 DIAGNOSIS — R06.09 DOE (DYSPNEA ON EXERTION): ICD-10-CM

## 2023-03-07 DIAGNOSIS — I50.22 SYSTOLIC CHF, CHRONIC (HCC): ICD-10-CM

## 2023-03-07 DIAGNOSIS — I25.10 CORONARY ARTERY DISEASE INVOLVING NATIVE CORONARY ARTERY OF NATIVE HEART WITHOUT ANGINA PECTORIS: ICD-10-CM

## 2023-03-07 PROCEDURE — G0463 HOSPITAL OUTPT CLINIC VISIT: HCPCS | Performed by: INTERNAL MEDICINE

## 2023-03-07 PROCEDURE — G8427 DOCREV CUR MEDS BY ELIG CLIN: HCPCS | Performed by: INTERNAL MEDICINE

## 2023-03-07 PROCEDURE — G8417 CALC BMI ABV UP PARAM F/U: HCPCS | Performed by: INTERNAL MEDICINE

## 2023-03-07 PROCEDURE — 1101F PT FALLS ASSESS-DOCD LE1/YR: CPT | Performed by: INTERNAL MEDICINE

## 2023-03-07 PROCEDURE — 99214 OFFICE O/P EST MOD 30 MIN: CPT | Performed by: INTERNAL MEDICINE

## 2023-03-07 PROCEDURE — G8536 NO DOC ELDER MAL SCRN: HCPCS | Performed by: INTERNAL MEDICINE

## 2023-03-07 PROCEDURE — G8510 SCR DEP NEG, NO PLAN REQD: HCPCS | Performed by: INTERNAL MEDICINE

## 2023-03-07 PROCEDURE — G0439 PPPS, SUBSEQ VISIT: HCPCS | Performed by: INTERNAL MEDICINE

## 2023-03-07 RX ORDER — PRENATAL VIT 91/IRON/FOLIC/DHA 28-975-200
COMBINATION PACKAGE (EA) ORAL 2 TIMES DAILY
Qty: 30 G | Refills: 0 | COMMUNITY
Start: 2023-03-07

## 2023-03-07 NOTE — PATIENT INSTRUCTIONS
Medicare Wellness Visit, Male    The best way to live healthy is to have a lifestyle where you eat a well-balanced diet, exercise regularly, limit alcohol use, and quit all forms of tobacco/nicotine, if applicable. Regular preventive services are another way to keep healthy. Preventive services (vaccines, screening tests, monitoring & exams) can help personalize your care plan, which helps you manage your own care. Screening tests can find health problems at the earliest stages, when they are easiest to treat. [facility] follows the current, evidence-based guidelines published by the [facility] ([facility]) when recommending preventive services for our patients. Because we follow these guidelines, sometimes recommendations change over time as research supports it. (For example, a prostate screening blood test is no longer routinely recommended for men with no symptoms). Of course, you and your doctor may decide to screen more often for some diseases, based on your risk and co-morbidities (chronic disease you are already diagnosed with). Preventive services for you include:  - Medicare offers their members a free annual wellness visit, which is time for you and your primary care provider to discuss and plan for your preventive service needs.  Take advantage of this benefit every year!    -Over the age of 72 should receive the recommended pneumonia vaccines.   -All adults should have a flu vaccine yearly.  -All adults should receive a tetanus vaccine every 10 years.  -Over the age of 48 should receive the shingles vaccines.    -All adults should be screened once for Hepatitis C.  -All adults age 38-68 who are overweight should have a diabetes screening test once every three years.   -Other screening tests & preventive services for persons with diabetes include: an eye exam to screen for diabetic retinopathy, a kidney function test, a foot exam, and stricter control over your cholesterol.   -Cardiovascular screening for adults with routine risk involves an electrocardiogram (ECG) at intervals determined by the provider.     -Colorectal cancer screening should be done for adults age 43-69 with no increased risk factors for colorectal cancer. There are a number of acceptable methods of screening for this type of cancer. Each test has its own benefits and drawbacks. Discuss with your provider what is most appropriate for you during your annual wellness visit. The different tests include: colonoscopy (considered the best screening method), a fecal occult blood test, a fecal DNA test, and sigmoidoscopy.    -Lung cancer screening is recommended annually with a low dose CT scan for adults between age 54 and 68, who have smoked at least 30 pack years (equivalent of 1 pack per day for 30 days), and who is a current smoker or quit less than 15 years ago. -An Abdominal Aortic Aneurysm (AAA) Screening is recommended for men age 73-68 who has ever smoked in their lifetime.      Here is a list of your current Health Maintenance items (your personalized list of preventive services) with a due date:  Health Maintenance Due   Topic Date Due    Hemoglobin A1C    03/02/2023    Cholesterol Test   02/25/2023

## 2023-03-07 NOTE — PROGRESS NOTES
This is the Subsequent Medicare Annual Wellness Exam, performed 12 months or more after the Initial AWV or the last Subsequent AWV    I have reviewed the patient's medical history in detail and updated the computerized patient record. Also for follow-up of his health issues. He has noted some ongoing issues with some dyspnea. He has pain in his shoulders and his knees. He has not been exercising regularly. He is not checking his sugars but has no symptoms of high or low sugars. No PND or orthopnea. No nausea or vomiting. No change in bowel or bladder habits.     ROS - Per HPI    Physical Examination: General appearance - alert, well appearing, and in no distress  Mental status - alert, oriented to person, place, and time  Ears - bilateral TM's and external ear canals normal  Mouth - mucous membranes moist, pharynx normal without lesions  Neck - supple, no significant adenopathy  Lymphatics - no palpable lymphadenopathy, no hepatosplenomegaly  Chest - clear to auscultation, no wheezes, rales or rhonchi, symmetric air entry  Heart - normal rate, regular rhythm, normal S1, S2, no murmurs, rubs, clicks or gallops  Abdomen - soft, nontender, nondistended, no masses or organomegaly  Neurological - alert, oriented, normal speech, no focal findings or movement disorder noted  Musculoskeletal - no joint tenderness, deformity or swelling  Extremities - peripheral pulses normal, no pedal edema, no clubbing or cyanosis       Diabetic foot exam performed by Sonia Alonso MD     Measurement  Response Nurse Comment Physician Comment   Monofilament  R - reduced sensation with micro filament  L - reduced sensation with micro filament     Pulse DP R - present  L - present     Pulse TP R - present  L - present     Structural deformity R - None  L - None     Skin Integrity / Deformity R - Yes - diffuse dry skin and all nails are thick and yellow  L - Yes - per above        Reviewed by:             Assessment/Plan Education and counseling provided:  Are appropriate based on today's review and evaluation  End-of-Life planning (with patient's consent)  Diabetes screening test    1. Medicare annual wellness visit, subsequent  2. HTN (hypertension), benign-blood pressure well controlled on current meds and will continue these.  -     LIPID PANEL; Future  -     HEMOGLOBIN A1C WITH EAG; Future  -      DIABETES FOOT EXAM  -     METABOLIC PANEL, COMPREHENSIVE; Future  -     CBC WITH AUTOMATED DIFF; Future  -     TSH 3RD GENERATION; Future  -     NT-PRO BNP; Future  3. Systolic CHF, chronic (HCC)-appears stable. Will check BNP and address if needed. He is seeing cardiology regularly. 4. DM (diabetes mellitus), type 2 with complications ([facility] [address]-? Controlled. A1c goal of less than 7.5. Check labs for that. -     HEMOGLOBIN A1C WITH EAG; Future  -      DIABETES FOOT EXAM  5. Coronary artery disease involving native coronary artery of native heart without angina pectoris-stable. 6. Dyslipidemia-LDL goal of 100. Check labs to be sure that is the case. -     LIPID PANEL; Future  7. Cerebrovascular accident (CVA), unspecified mechanism (HCC)-stable on meds. 8. FUCHS (dyspnea on exertion)-likely deconditioning. We will check for evidence of volume overload. -     NT-PRO BNP; Future   9. Toenail fungus and tinea pedis - will refer to podiatry. Depression Risk Factor Screening     3 most recent PHQ Screens 3/7/2023   Little interest or pleasure in doing things Not at all   Feeling down, depressed, irritable, or hopeless Not at all   Total Score PHQ 2 0       Alcohol & Drug Abuse Risk Screen    Do you average more than 1 drink per night or more than 7 drinks a week: No    In the past three months have you have had more than 4 drinks containing alcohol on one occasion: No          Functional Ability and Level of Safety    Hearing: Hearing is good. Activities of Daily Living:   The home contains: no safety equipment. Patient does total self care      Ambulation: with no difficulty     Fall Risk:  Fall Risk Assessment, last 12 mths 3/7/2023   Able to walk? Yes   Fall in past 12 months? 1   Do you feel unsteady? 0   Are you worried about falling 0   Is TUG test greater than 12 seconds? 0   Is the gait abnormal? 0   Number of falls in past 12 months 2   Fall with injury?  0      Abuse Screen:  Patient is not abused       Cognitive Screening    Has your family/caregiver stated any concerns about your memory: no         Health Maintenance Due     Health Maintenance Due   Topic Date Due    A1C test (Diabetic or Prediabetic)  03/02/2023    Lipid Screen  02/25/2023       Patient Care Team   Patient Care Team:  Ani Fagan MD as PCP - General (Internal Medicine Physician)  Ani Fagan MD as PCP - REHABILITATION [facility]  Mychal Neves MD as Physician (Cardiovascular Disease Physician)  Tata Drake MD as Surgeon (Cardiothoracic Surgery)  Cammie Palma MD (Dermatology Physician)  Jennifer Guadarrama MD (Ophthalmology)  Gin Alejandra MD (Orthopedic Surgery)  Maru Wolfe MD (Neurology)  Fara Boucher MD (Urology)  Myles Gerard DPM (Podiatry)    History     Patient Active Problem List   Diagnosis Code    HTN (hypertension), benign I10    DM (diabetes mellitus), type 2 with complications ([address] E20.4    CAD (coronary artery disease) I25.10    S/P CABG x 1 Z95.1    Dyslipidemia E78.5    CVA (cerebral vascular accident) [facility]) I63.9    Status post right shoulder hemiarthroplasty 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.620    Systolic CHF, chronic ([address] I50.22     Past Medical History:   Diagnosis Date    CAD (coronary artery disease) 01/23/2017    stent    DJD (degenerative joint disease) of knee 11/2013    left TKR    ED (erectile dysfunction)     Essential hypertension     Hyperlipidemia     T2DM (type 2 diabetes mellitus) ([address]     Thalassemia minor       Past Surgical History:   Procedure Laterality Date    COLONOSCOPY N/A 6/1/2021    COLONOSCOPY,EGD   :- performed by Kimberly Sam [facility] at [facility] 2 2004    mild CAD, EF 60-65%    HX KNEE ARTHROSCOPY Left 11/2013    HX KNEE REPLACEMENT Left 2013    HX SHOULDER REPLACEMENT Right 1968    HX TURP  02/20/2019    IA CORONARY ARTERY BYP W/VEIN & ARTERY GRAFT 1 VEIN  01/19/2017    DAVINCI SABINE TAKEDOWN, LEFT ANTERIOR THORACOTOMY, OFF PUMP CORONARY ARTERY BYPASS GRAFTING X1,    STRESS TEST CARDIOLITE  5/14/2004    5 min, inferior ischemia, EF 60%    STRESS TEST CARDIOLITE  6/30/11    4 min, normal perfusion EF 50%     Current Outpatient Medications   Medication Sig Dispense Refill    lisinopriL (PRINIVIL, ZESTRIL) 40 mg tablet TAKE ONE TABLET BY MOUTH DAILY 90 Tablet 0    glipiZIDE (GLUCOTROL) 5 mg tablet TAKE ONE TABLET BY MOUTH DAILY 90 Tablet 0    metFORMIN ER (GLUCOPHAGE XR) 500 mg tablet TAKE TWO TABLETS BY MOUTH DAILY 180 Tablet 0    rosuvastatin (CRESTOR) 10 mg tablet TAKE ONE TABLET BY MOUTH DAILY 90 Tablet 0    carvediloL (COREG) 6.25 mg tablet Take 1 Tablet by mouth two (2) times a day. 180 Tablet 0    amLODIPine (NORVASC) 10 mg tablet Take 1 Tablet by mouth daily. 90 Tablet 0    escitalopram oxalate (LEXAPRO) 20 mg tablet Take 1 Tablet by mouth daily. 90 Tablet 3    cyclobenzaprine (FLEXERIL) 10 mg tablet Take 1 Tablet by mouth three (3) times daily as needed for Muscle Spasm(s). 20 Tablet 0    folic acid/multivit-min/lutein (CENTRUM SILVER PO) Take  by mouth. acetaminophen (TYLENOL) 500 mg tablet Take 1,000 mg by mouth every six (6) hours as needed for Pain. aspirin delayed-release 81 mg tablet Take 81 mg by mouth daily. CALCIUM CARBONATE PO Take  by mouth. (Patient not taking: Reported on 3/7/2023)      tamsulosin (FLOMAX) 0.4 mg capsule Take 0.4 mg by mouth daily. (Patient not taking: Reported on 3/7/2023)      cholecalciferol (VITAMIN D3) (2,000 UNITS /50 MCG) cap capsule Take  by mouth daily.  (Patient not taking: No sig reported) No Known Allergies    Family History   Problem Relation Age of Onset    Cancer Mother         breast    Diabetes Maternal Grandfather     Hypertension Maternal Grandfather     Other Brother         kathy cardiac    Diabetes Brother     Heart Disease Brother      Social History     Tobacco Use    Smoking status: Former     Packs/day: 2.00     Years: 15.00     Pack years: 30.00     Types: Cigarettes     Quit date: 1981     Years since quittin.3    Smokeless tobacco: Never    Tobacco comments:     quit > 30 years ago   Substance Use Topics    Alcohol use: Yes     Comment: laura Carranza MD

## 2023-03-08 LAB
ALBUMIN SERPL-MCNC: 3.9 G/DL (ref 3.5–5)
ALBUMIN/GLOB SERPL: 1.6 (ref 1.1–2.2)
ALP SERPL-CCNC: 73 U/L (ref 45–117)
ALT SERPL-CCNC: 23 U/L (ref 12–78)
ANION GAP SERPL CALC-SCNC: 2 MMOL/L (ref 5–15)
AST SERPL-CCNC: 17 U/L (ref 15–37)
BASOPHILS # BLD: 0.1 K/UL (ref 0–0.1)
BASOPHILS NFR BLD: 1 % (ref 0–1)
BILIRUB SERPL-MCNC: 1.3 MG/DL (ref 0.2–1)
BNP SERPL-MCNC: 224 PG/ML
BUN SERPL-MCNC: 19 MG/DL (ref 6–20)
BUN/CREAT SERPL: 19 (ref 12–20)
CALCIUM SERPL-MCNC: 9 MG/DL (ref 8.5–10.1)
CHLORIDE SERPL-SCNC: 109 MMOL/L (ref 97–108)
CHOLEST SERPL-MCNC: 112 MG/DL
CO2 SERPL-SCNC: 28 MMOL/L (ref 21–32)
CREAT SERPL-MCNC: 0.99 MG/DL (ref 0.7–1.3)
DIFFERENTIAL METHOD BLD: ABNORMAL
EOSINOPHIL # BLD: 0.1 K/UL (ref 0–0.4)
EOSINOPHIL NFR BLD: 2 % (ref 0–7)
ERYTHROCYTE [DISTWIDTH] IN BLOOD BY AUTOMATED COUNT: 17.4 % (ref 11.5–14.5)
EST. AVERAGE GLUCOSE BLD GHB EST-MCNC: 174 MG/DL
GLOBULIN SER CALC-MCNC: 2.5 G/DL (ref 2–4)
GLUCOSE SERPL-MCNC: 195 MG/DL (ref 65–100)
HBA1C MFR BLD: 7.7 % (ref 4–5.6)
HCT VFR BLD AUTO: 31.4 % (ref 36.6–50.3)
HDLC SERPL-MCNC: 41 MG/DL
HDLC SERPL: 2.7 (ref 0–5)
HGB BLD-MCNC: 9.7 G/DL (ref 12.1–17)
IMM GRANULOCYTES # BLD AUTO: 0 K/UL (ref 0–0.04)
IMM GRANULOCYTES NFR BLD AUTO: 0 % (ref 0–0.5)
LDLC SERPL CALC-MCNC: 47.6 MG/DL (ref 0–100)
LYMPHOCYTES # BLD: 1.8 K/UL (ref 0.8–3.5)
LYMPHOCYTES NFR BLD: 30 % (ref 12–49)
MCH RBC QN AUTO: 19.1 PG (ref 26–34)
MCHC RBC AUTO-ENTMCNC: 30.9 G/DL (ref 30–36.5)
MCV RBC AUTO: 61.8 FL (ref 80–99)
MONOCYTES # BLD: 0.4 K/UL (ref 0–1)
MONOCYTES NFR BLD: 7 % (ref 5–13)
NEUTS SEG # BLD: 3.5 K/UL (ref 1.8–8)
NEUTS SEG NFR BLD: 60 % (ref 32–75)
NRBC # BLD: 0 K/UL (ref 0–0.01)
NRBC BLD-RTO: 0 PER 100 WBC
PLATELET # BLD AUTO: 254 K/UL (ref 150–400)
PMV BLD AUTO: 10.2 FL (ref 8.9–12.9)
POTASSIUM SERPL-SCNC: 4.1 MMOL/L (ref 3.5–5.1)
PROT SERPL-MCNC: 6.4 G/DL (ref 6.4–8.2)
RBC # BLD AUTO: 5.08 M/UL (ref 4.1–5.7)
RBC MORPH BLD: ABNORMAL
SODIUM SERPL-SCNC: 139 MMOL/L (ref 136–145)
TRIGL SERPL-MCNC: 117 MG/DL (ref ?–150)
TSH SERPL DL<=0.05 MIU/L-ACNC: 1.85 UIU/ML (ref 0.36–3.74)
VLDLC SERPL CALC-MCNC: 23.4 MG/DL
WBC # BLD AUTO: 5.9 K/UL (ref 4.1–11.1)

## 2023-05-10 ENCOUNTER — OFFICE VISIT (OUTPATIENT)
Age: 78
End: 2023-05-10
Payer: MEDICARE

## 2023-05-10 VITALS
RESPIRATION RATE: 16 BRPM | BODY MASS INDEX: 33.27 KG/M2 | SYSTOLIC BLOOD PRESSURE: 108 MMHG | TEMPERATURE: 97.5 F | HEIGHT: 73 IN | HEART RATE: 63 BPM | DIASTOLIC BLOOD PRESSURE: 72 MMHG | OXYGEN SATURATION: 98 % | WEIGHT: 251 LBS

## 2023-05-10 DIAGNOSIS — I50.22 CHRONIC SYSTOLIC (CONGESTIVE) HEART FAILURE (HCC): ICD-10-CM

## 2023-05-10 DIAGNOSIS — I10 ESSENTIAL (PRIMARY) HYPERTENSION: ICD-10-CM

## 2023-05-10 DIAGNOSIS — I10 HTN (HYPERTENSION), BENIGN: Primary | ICD-10-CM

## 2023-05-10 DIAGNOSIS — E11.8 TYPE 2 DIABETES MELLITUS WITH UNSPECIFIED COMPLICATIONS (HCC): ICD-10-CM

## 2023-05-10 DIAGNOSIS — I25.10 CORONARY ARTERY DISEASE INVOLVING NATIVE CORONARY ARTERY OF NATIVE HEART WITHOUT ANGINA PECTORIS: ICD-10-CM

## 2023-05-10 DIAGNOSIS — E78.2 MIXED HYPERLIPIDEMIA: ICD-10-CM

## 2023-05-10 PROCEDURE — G8427 DOCREV CUR MEDS BY ELIG CLIN: HCPCS | Performed by: INTERNAL MEDICINE

## 2023-05-10 PROCEDURE — 3078F DIAST BP <80 MM HG: CPT | Performed by: INTERNAL MEDICINE

## 2023-05-10 PROCEDURE — 3051F HG A1C>EQUAL 7.0%<8.0%: CPT | Performed by: INTERNAL MEDICINE

## 2023-05-10 PROCEDURE — 99214 OFFICE O/P EST MOD 30 MIN: CPT | Performed by: INTERNAL MEDICINE

## 2023-05-10 PROCEDURE — 1123F ACP DISCUSS/DSCN MKR DOCD: CPT | Performed by: INTERNAL MEDICINE

## 2023-05-10 PROCEDURE — 3074F SYST BP LT 130 MM HG: CPT | Performed by: INTERNAL MEDICINE

## 2023-05-10 PROCEDURE — G8417 CALC BMI ABV UP PARAM F/U: HCPCS | Performed by: INTERNAL MEDICINE

## 2023-05-10 PROCEDURE — 1036F TOBACCO NON-USER: CPT | Performed by: INTERNAL MEDICINE

## 2023-05-10 RX ORDER — METFORMIN HYDROCHLORIDE 500 MG/1
1 TABLET, EXTENDED RELEASE ORAL 2 TIMES DAILY
COMMUNITY
Start: 2023-03-05

## 2023-05-10 RX ORDER — BUPROPION HYDROCHLORIDE 150 MG/1
150 TABLET ORAL EVERY MORNING
Qty: 30 TABLET | Refills: 3 | Status: SHIPPED | OUTPATIENT
Start: 2023-05-10

## 2023-05-10 RX ORDER — ASPIRIN 81 MG/1
81 TABLET ORAL DAILY
COMMUNITY

## 2023-05-10 RX ORDER — GLIPIZIDE 5 MG/1
5 TABLET ORAL DAILY
COMMUNITY
Start: 2023-02-24

## 2023-05-10 RX ORDER — CARVEDILOL 6.25 MG/1
TABLET ORAL
COMMUNITY
Start: 2023-02-24

## 2023-05-10 RX ORDER — ESCITALOPRAM OXALATE 20 MG/1
1 TABLET ORAL DAILY
COMMUNITY
Start: 2023-04-25

## 2023-05-10 RX ORDER — LISINOPRIL 40 MG/1
40 TABLET ORAL DAILY
COMMUNITY
Start: 2023-02-24

## 2023-05-10 RX ORDER — NICOTINE 14MG/24HR
1 PATCH, TRANSDERMAL 24 HOURS TRANSDERMAL DAILY
COMMUNITY

## 2023-05-10 RX ORDER — ROSUVASTATIN CALCIUM 10 MG/1
10 TABLET, COATED ORAL DAILY
COMMUNITY
Start: 2023-02-24

## 2023-05-10 RX ORDER — PRENATAL VIT 91/IRON/FOLIC/DHA 28-975-200
COMBINATION PACKAGE (EA) ORAL 2 TIMES DAILY
COMMUNITY
Start: 2023-03-07

## 2023-05-10 RX ORDER — FINASTERIDE 5 MG/1
5 TABLET, FILM COATED ORAL DAILY
COMMUNITY
Start: 2023-04-25

## 2023-05-10 RX ORDER — AMLODIPINE BESYLATE 10 MG/1
1 TABLET ORAL DAILY
COMMUNITY
Start: 2023-02-24

## 2023-05-10 RX ORDER — ACETAMINOPHEN 500 MG
1000 TABLET ORAL EVERY 6 HOURS PRN
COMMUNITY

## 2023-05-10 RX ORDER — CYCLOBENZAPRINE HCL 10 MG
10 TABLET ORAL 3 TIMES DAILY PRN
COMMUNITY
Start: 2021-10-15

## 2023-05-10 SDOH — ECONOMIC STABILITY: INCOME INSECURITY: HOW HARD IS IT FOR YOU TO PAY FOR THE VERY BASICS LIKE FOOD, HOUSING, MEDICAL CARE, AND HEATING?: NOT HARD AT ALL

## 2023-05-10 SDOH — ECONOMIC STABILITY: HOUSING INSECURITY
IN THE LAST 12 MONTHS, WAS THERE A TIME WHEN YOU DID NOT HAVE A STEADY PLACE TO SLEEP OR SLEPT IN A SHELTER (INCLUDING NOW)?: NO

## 2023-05-10 SDOH — ECONOMIC STABILITY: FOOD INSECURITY: WITHIN THE PAST 12 MONTHS, THE FOOD YOU BOUGHT JUST DIDN'T LAST AND YOU DIDN'T HAVE MONEY TO GET MORE.: NEVER TRUE

## 2023-05-10 SDOH — ECONOMIC STABILITY: FOOD INSECURITY: WITHIN THE PAST 12 MONTHS, YOU WORRIED THAT YOUR FOOD WOULD RUN OUT BEFORE YOU GOT MONEY TO BUY MORE.: NEVER TRUE

## 2023-05-10 ASSESSMENT — PATIENT HEALTH QUESTIONNAIRE - PHQ9
SUM OF ALL RESPONSES TO PHQ QUESTIONS 1-9: 0
1. LITTLE INTEREST OR PLEASURE IN DOING THINGS: 0
SUM OF ALL RESPONSES TO PHQ QUESTIONS 1-9: 0
2. FEELING DOWN, DEPRESSED OR HOPELESS: 0
SUM OF ALL RESPONSES TO PHQ9 QUESTIONS 1 & 2: 0
SUM OF ALL RESPONSES TO PHQ QUESTIONS 1-9: 0
SUM OF ALL RESPONSES TO PHQ QUESTIONS 1-9: 0

## 2023-05-10 NOTE — PROGRESS NOTES
Verified name and birth date for privacy precautions. Chart reviewed in preparation for today's visit. Chief Complaint   Patient presents with    Fatigue          There are no preventive care reminders to display for this patient. Wt Readings from Last 3 Encounters:   05/10/23 251 lb (113.9 kg)   03/07/23 249 lb (112.9 kg)   09/02/22 241 lb (109.3 kg)     Temp Readings from Last 3 Encounters:   05/10/23 97.5 °F (36.4 °C) (Temporal)     BP Readings from Last 3 Encounters:   05/10/23 108/72   03/07/23 116/71   09/02/22 107/69     Pulse Readings from Last 3 Encounters:   05/10/23 63   03/07/23 62   09/02/22 60     Depression: Not at risk    PHQ-2 Score: 0     AMB Abuse Screening 5/10/2023   Do you ever feel afraid of your partner? N   Are you in a relationship with someone who physically or mentally threatens you? N   Is it safe for you to go home? Y     Amb Fall Risk Assessment and TUG Test 5/10/2023   Do you feel unsteady or are you worried about falling?  no   2 or more falls in past year? no   Fall with injury in past year? no   Fall in past 12 months? -   Able to walk?  -   Total Score -

## 2023-05-10 NOTE — PROGRESS NOTES
HPI:  Efren Junior is a 66y.o. year old male who is here for a follow up. Has noticed ongoing issues with fatigue. He is also decided he has ADD with difficulty focusing. Does have ongoing issues with irritability and difficulty getting along with his wife. He does feel somewhat depressed and fatigued. He denies any suicidality. Denies any homicidality. Denies any chest pains. He does have some dyspnea on exertion. No PND or orthopnea. No nausea or vomiting. Has not been exercising regularly. Past Medical History:   Diagnosis Date    CAD (coronary artery disease) 01/23/2017    stent    DJD (degenerative joint disease) of knee 11/2013    left TKR    ED (erectile dysfunction)     Essential hypertension     Hyperlipidemia     T2DM (type 2 diabetes mellitus) ([facility] [address]     Thalassemia minor        Past Surgical History:   Procedure Laterality Date    CABG, ARTERY-VEIN, SINGLE  01/19/2017    DAVINCI JEREMÍAS TAKEDOWN, LEFT ANTERIOR THORACOTOMY, OFF PUMP CORONARY ARTERY BYPASS GRAFTING X1,    CARDIAC CATHETERIZATION  2004    mild CAD, EF 60-65%    COLONOSCOPY N/A 6/1/2021    COLONOSCOPY,EGD   :- performed by Abhijeet Ireland MD at [address] [place] [address] ARTHROSCOPY Left 11/2013    SHOULDER ARTHROPLASTY Right 1968    STRESS TEST, LEXISCAN  6/30/11    4 min, normal perfusion EF 50%    STRESS TEST, LEXISCAN  5/14/2004    5 min, inferior ischemia, EF 60%    TOTAL KNEE ARTHROPLASTY Left 2013    TURP  02/20/2019       Prior to Admission medications    Medication Sig Start Date End Date Taking?  Authorizing Provider   acetaminophen (TYLENOL) 500 MG tablet Take 2 tablets by mouth every 6 hours as needed   Yes Historical Provider, MD   amLODIPine (NORVASC) 10 MG tablet Take 1 tablet by mouth daily 2/24/23  Yes Historical Provider, MD   aspirin 81 MG EC tablet Take 1 tablet by mouth daily   Yes Historical Provider, MD   carvedilol (COREG) 6.25 MG tablet  2/24/23  Yes Historical Provider, MD   cyclobenzaprine (FLEXERIL) 10

## 2023-05-30 RX ORDER — BUPROPION HYDROCHLORIDE 150 MG/1
150 TABLET ORAL EVERY MORNING
Qty: 90 TABLET | Refills: 1 | Status: SHIPPED | OUTPATIENT
Start: 2023-05-30

## 2023-05-30 RX ORDER — METFORMIN HYDROCHLORIDE 500 MG/1
500 TABLET, EXTENDED RELEASE ORAL 2 TIMES DAILY
Qty: 180 TABLET | Refills: 1 | Status: SHIPPED | OUTPATIENT
Start: 2023-05-30

## 2023-05-30 RX ORDER — CARVEDILOL 6.25 MG/1
6.25 TABLET ORAL 2 TIMES DAILY
Qty: 180 TABLET | Refills: 1 | Status: SHIPPED | OUTPATIENT
Start: 2023-05-30

## 2023-05-30 RX ORDER — LISINOPRIL 40 MG/1
40 TABLET ORAL DAILY
Qty: 90 TABLET | Refills: 1 | Status: SHIPPED | OUTPATIENT
Start: 2023-05-30

## 2023-05-30 NOTE — TELEPHONE ENCOUNTER
Medication Refill Request    June Coats is requesting a refill of the following medication(s):     carvedilol (COREG) 6.25 MG tablet  metFORMIN (GLUCOPHAGE-XR) 500 MG extended release tablet  lisinopril (PRINIVIL;ZESTRIL) 40 MG tablet  buPROPion (WELLBUTRIN XL) 150 MG extended release tablet    Please send refill to:   [id number] - [address], [address] [facility] [address] [phone]    Pt would like a 90 day supply on all meds. Pt states he is out of the Lisinopril and will be out of the other medications after next week.

## 2023-07-21 RX ORDER — BUPROPION HYDROCHLORIDE 150 MG/1
150 TABLET ORAL EVERY MORNING
Qty: 90 TABLET | Refills: 1 | Status: SHIPPED | OUTPATIENT
Start: 2023-07-21

## 2023-08-02 ENCOUNTER — TELEPHONE (OUTPATIENT)
Age: 78
End: 2023-08-02

## 2023-08-02 RX ORDER — GLIPIZIDE 5 MG/1
5 TABLET ORAL DAILY
Qty: 90 TABLET | Refills: 0 | Status: SHIPPED | OUTPATIENT
Start: 2023-08-02 | End: 2023-08-04 | Stop reason: SDUPTHER

## 2023-08-02 RX ORDER — AMLODIPINE BESYLATE 10 MG/1
10 TABLET ORAL DAILY
Qty: 90 TABLET | Refills: 0 | Status: SHIPPED | OUTPATIENT
Start: 2023-08-02 | End: 2023-08-04 | Stop reason: SDUPTHER

## 2023-08-02 NOTE — TELEPHONE ENCOUNTER
Medication Refill Request    Roberto Carlos Alejo is requesting a refill of the following medication(s):       glipiZIDE (GLUCOTROL) 5 MG tablet         amLODIPine (NORVASC) 10 MG tablet         Please send refill to:     [id number] [facility] [address] [facility],[address], [address] [facility],[address] [facility] -  [phone]

## 2023-08-04 ENCOUNTER — TELEPHONE (OUTPATIENT)
Age: 78
End: 2023-08-04

## 2023-08-04 ENCOUNTER — TELEMEDICINE (OUTPATIENT)
Age: 78
End: 2023-08-04
Payer: MEDICARE

## 2023-08-04 DIAGNOSIS — U07.1 COVID: Primary | ICD-10-CM

## 2023-08-04 PROCEDURE — 1123F ACP DISCUSS/DSCN MKR DOCD: CPT | Performed by: NURSE PRACTITIONER

## 2023-08-04 PROCEDURE — 99213 OFFICE O/P EST LOW 20 MIN: CPT | Performed by: NURSE PRACTITIONER

## 2023-08-04 PROCEDURE — G8427 DOCREV CUR MEDS BY ELIG CLIN: HCPCS | Performed by: NURSE PRACTITIONER

## 2023-08-04 RX ORDER — FINASTERIDE 5 MG/1
5 TABLET, FILM COATED ORAL DAILY
Qty: 90 TABLET | Refills: 0 | Status: SHIPPED | OUTPATIENT
Start: 2023-08-04

## 2023-08-04 RX ORDER — GLIPIZIDE 5 MG/1
5 TABLET ORAL DAILY
Qty: 90 TABLET | Refills: 0 | Status: SHIPPED | OUTPATIENT
Start: 2023-08-04

## 2023-08-04 RX ORDER — CARVEDILOL 6.25 MG/1
6.25 TABLET ORAL 2 TIMES DAILY
Qty: 180 TABLET | Refills: 1 | Status: SHIPPED | OUTPATIENT
Start: 2023-08-04

## 2023-08-04 RX ORDER — BUPROPION HYDROCHLORIDE 150 MG/1
150 TABLET ORAL EVERY MORNING
Qty: 90 TABLET | Refills: 1 | Status: SHIPPED | OUTPATIENT
Start: 2023-08-04

## 2023-08-04 RX ORDER — TAMSULOSIN HYDROCHLORIDE 0.4 MG/1
CAPSULE ORAL
COMMUNITY
Start: 2023-08-02 | End: 2023-08-04 | Stop reason: SDUPTHER

## 2023-08-04 RX ORDER — TAMSULOSIN HYDROCHLORIDE 0.4 MG/1
0.4 CAPSULE ORAL DAILY
Qty: 30 CAPSULE | Refills: 0 | Status: SHIPPED | OUTPATIENT
Start: 2023-08-04

## 2023-08-04 RX ORDER — ROSUVASTATIN CALCIUM 10 MG/1
10 TABLET, COATED ORAL DAILY
Qty: 90 TABLET | Refills: 0 | Status: SHIPPED | OUTPATIENT
Start: 2023-08-04

## 2023-08-04 RX ORDER — METFORMIN HYDROCHLORIDE 500 MG/1
500 TABLET, EXTENDED RELEASE ORAL 2 TIMES DAILY
Qty: 180 TABLET | Refills: 1 | Status: SHIPPED | OUTPATIENT
Start: 2023-08-04

## 2023-08-04 RX ORDER — ESCITALOPRAM OXALATE 20 MG/1
20 TABLET ORAL DAILY
Qty: 90 TABLET | Refills: 0 | Status: SHIPPED | OUTPATIENT
Start: 2023-08-04

## 2023-08-04 RX ORDER — AMLODIPINE BESYLATE 10 MG/1
10 TABLET ORAL DAILY
Qty: 90 TABLET | Refills: 0 | Status: SHIPPED | OUTPATIENT
Start: 2023-08-04

## 2023-08-04 RX ORDER — LISINOPRIL 40 MG/1
40 TABLET ORAL DAILY
Qty: 90 TABLET | Refills: 1 | Status: SHIPPED | OUTPATIENT
Start: 2023-08-04

## 2023-08-04 ASSESSMENT — PATIENT HEALTH QUESTIONNAIRE - PHQ9
SUM OF ALL RESPONSES TO PHQ9 QUESTIONS 1 & 2: 2
1. LITTLE INTEREST OR PLEASURE IN DOING THINGS: 1
SUM OF ALL RESPONSES TO PHQ QUESTIONS 1-9: 2
SUM OF ALL RESPONSES TO PHQ QUESTIONS 1-9: 2
2. FEELING DOWN, DEPRESSED OR HOPELESS: 1
SUM OF ALL RESPONSES TO PHQ QUESTIONS 1-9: 2
SUM OF ALL RESPONSES TO PHQ QUESTIONS 1-9: 2

## 2023-08-04 NOTE — PROGRESS NOTES
inferior ischemia, EF 60%    TOTAL KNEE ARTHROPLASTY Left 2013    TURP  02/20/2019       Review of Systems   - per HPI    Objective:   No data recorded   General: Fatigue, alert   Mental  status: normal mood, behavior, speech, dress, motor activity, and thought processes, able to follow commands   Eyes: EOM intact, normal sclera   Mouth: mucous membranes moist   Neck: no visualized mass   Resp: normal effort and no respiratory distress   Neuro: no gross deficits   Musculoskeletal: Normal ROM of neck   Skin: no discoloration or lesions of concern on visible areas   Psychiatric: normal affect, no hallucinations     Claudis Bathe, was evaluated through a synchronous (real-time) audio-video encounter. The patient (or guardian if applicable) is aware that this is a billable service, which includes applicable co-pays. This Virtual Visit was conducted with patient's (and/or legal guardian's) consent. Patient identification was verified, and a caregiver was present when appropriate.    The patient was located at Home: [facility]  Provider was located at [facility] (Appt Dept): [address]  [address] [facility],  [address]           DHIRAJ Spicer NP

## 2023-08-20 RX ORDER — GLIPIZIDE 5 MG/1
TABLET ORAL
Qty: 90 TABLET | Refills: 3 | Status: SHIPPED | OUTPATIENT
Start: 2023-08-20

## 2023-08-20 RX ORDER — AMLODIPINE BESYLATE 10 MG/1
TABLET ORAL
Qty: 90 TABLET | Refills: 3 | Status: SHIPPED | OUTPATIENT
Start: 2023-08-20

## 2023-08-25 ENCOUNTER — TELEPHONE (OUTPATIENT)
Age: 78
End: 2023-08-25

## 2023-08-25 LAB — LEFT VENTRICULAR EJECTION FRACTION, EXTERNAL: NORMAL

## 2023-08-25 RX ORDER — ESCITALOPRAM OXALATE 20 MG/1
20 TABLET ORAL DAILY
Qty: 90 TABLET | Refills: 3 | Status: SHIPPED | OUTPATIENT
Start: 2023-08-25

## 2023-08-25 RX ORDER — TAMSULOSIN HYDROCHLORIDE 0.4 MG/1
0.4 CAPSULE ORAL DAILY
Qty: 90 CAPSULE | Refills: 3 | Status: SHIPPED | OUTPATIENT
Start: 2023-08-25

## 2023-08-25 RX ORDER — FINASTERIDE 5 MG/1
5 TABLET, FILM COATED ORAL DAILY
Qty: 90 TABLET | Refills: 3 | Status: SHIPPED | OUTPATIENT
Start: 2023-08-25

## 2023-08-25 NOTE — TELEPHONE ENCOUNTER
Spoke to patient. He said he is out of Glipizide, Lexapro, Tamsulosin, Finasteride, and Amlodipine. When he contacted [email or web address], he was told his prescriptions are not eligible for refill until 10/18/23. Spoke with [email or web address]  Amlodipine & glipizide shipped on August 22  Lexapro, Finasteride, Tamsulosin - it was too soon on Aug 6 for them to be filled was advised to re-send these to [email or web address].     Rx sent Alise Salgado

## 2023-08-25 NOTE — TELEPHONE ENCOUNTER
Medication Refill Request    Vargas Crystal is requesting a refill of the following medication(s):   escitalopram (LEXAPRO) 20 MG tablet                      Please send refill to:      [address] [place] [address], [address] [place] [address],[facility] Floor [phone] - Q [phone] (Pharmacy)

## 2023-08-29 ENCOUNTER — OFFICE VISIT (OUTPATIENT)
Age: 78
End: 2023-08-29
Payer: MEDICARE

## 2023-08-29 VITALS
HEART RATE: 74 BPM | SYSTOLIC BLOOD PRESSURE: 120 MMHG | WEIGHT: 238.6 LBS | DIASTOLIC BLOOD PRESSURE: 67 MMHG | OXYGEN SATURATION: 99 % | TEMPERATURE: 97.8 F | HEIGHT: 73 IN | BODY MASS INDEX: 31.62 KG/M2 | RESPIRATION RATE: 15 BRPM

## 2023-08-29 DIAGNOSIS — E11.8 TYPE 2 DIABETES MELLITUS WITH UNSPECIFIED COMPLICATIONS (HCC): ICD-10-CM

## 2023-08-29 DIAGNOSIS — R06.09 OTHER FORMS OF DYSPNEA: Primary | ICD-10-CM

## 2023-08-29 DIAGNOSIS — F33.1 MAJOR DEPRESSIVE DISORDER, RECURRENT, MODERATE (HCC): ICD-10-CM

## 2023-08-29 DIAGNOSIS — I10 ESSENTIAL (PRIMARY) HYPERTENSION: ICD-10-CM

## 2023-08-29 PROCEDURE — 3074F SYST BP LT 130 MM HG: CPT | Performed by: INTERNAL MEDICINE

## 2023-08-29 PROCEDURE — 1036F TOBACCO NON-USER: CPT | Performed by: INTERNAL MEDICINE

## 2023-08-29 PROCEDURE — G8417 CALC BMI ABV UP PARAM F/U: HCPCS | Performed by: INTERNAL MEDICINE

## 2023-08-29 PROCEDURE — 1123F ACP DISCUSS/DSCN MKR DOCD: CPT | Performed by: INTERNAL MEDICINE

## 2023-08-29 PROCEDURE — 3051F HG A1C>EQUAL 7.0%<8.0%: CPT | Performed by: INTERNAL MEDICINE

## 2023-08-29 PROCEDURE — 3078F DIAST BP <80 MM HG: CPT | Performed by: INTERNAL MEDICINE

## 2023-08-29 PROCEDURE — 99214 OFFICE O/P EST MOD 30 MIN: CPT | Performed by: INTERNAL MEDICINE

## 2023-08-29 PROCEDURE — G8427 DOCREV CUR MEDS BY ELIG CLIN: HCPCS | Performed by: INTERNAL MEDICINE

## 2023-08-29 RX ORDER — CHLORTHALIDONE 25 MG/1
TABLET ORAL
COMMUNITY
Start: 2023-08-21

## 2023-08-29 RX ORDER — FINASTERIDE 5 MG/1
5 TABLET, FILM COATED ORAL DAILY
Qty: 90 TABLET | Refills: 3 | Status: SHIPPED | OUTPATIENT
Start: 2023-08-29

## 2023-08-29 RX ORDER — ESCITALOPRAM OXALATE 20 MG/1
30 TABLET ORAL DAILY
Qty: 135 TABLET | Refills: 3 | Status: SHIPPED | OUTPATIENT
Start: 2023-08-29

## 2023-08-29 RX ORDER — TAMSULOSIN HYDROCHLORIDE 0.4 MG/1
0.4 CAPSULE ORAL DAILY
Qty: 90 CAPSULE | Refills: 3 | Status: SHIPPED | OUTPATIENT
Start: 2023-08-29

## 2023-08-29 NOTE — PROGRESS NOTES
HPI:  Ted Decker is a 66y.o. year old male who is here for a follow-up visit. He continues to have issues with anxiety. He actually ran out of his Lexapro due to pharmacy issues. He is also not been able to get his Flomax or Proscar refilled either. His anxiety has increased. His blood pressures have been slightly elevated at home. He has had some increased shortness of breath at times his wife feels is related to anxiety. Past Medical History:   Diagnosis Date    CAD (coronary artery disease) 01/23/2017    stent    DJD (degenerative joint disease) of knee 11/2013    left TKR    ED (erectile dysfunction)     Essential hypertension     Hyperlipidemia     T2DM (type 2 diabetes mellitus) ([address])     Thalassemia minor        Past Surgical History:   Procedure Laterality Date    CABG, ARTERY-VEIN, SINGLE  01/19/2017    DAVINCI JEREMÍAS TAKEDOWN, LEFT ANTERIOR THORACOTOMY, OFF PUMP CORONARY ARTERY BYPASS GRAFTING X1,    CARDIAC CATHETERIZATION  2004    mild CAD, EF 60-65%    COLONOSCOPY N/A 6/1/2021    COLONOSCOPY,EGD   :- performed by Yunier Hanna MD at [address] [place] [address],[address] ARTHROSCOPY Left 11/2013    SHOULDER ARTHROPLASTY Right 1968    STRESS TEST, LEXISCAN  6/30/11    4 min, normal perfusion EF 50%    STRESS TEST, LEXISCAN  5/14/2004    5 min, inferior ischemia, EF 60%    TOTAL KNEE ARTHROPLASTY Left 2013    TURP  02/20/2019       Prior to Admission medications    Medication Sig Start Date End Date Taking?  Authorizing Provider   chlorthalidone (HYGROTON) 25 MG tablet  8/21/23  Yes Historical Provider, MD   escitalopram (LEXAPRO) 20 MG tablet Take 1.5 tablets by mouth daily 8/29/23  Yes Ricardo El MD   tamsulosin [facility]) 0.4 MG capsule Take 1 capsule by mouth daily 8/29/23  Yes Ricardo El MD   finasteride (PROSCAR) 5 MG tablet Take 1 tablet by mouth daily 8/29/23  Yes Ricardo El MD   glipiZIDE (GLUCOTROL) 5 MG tablet TAKE 1 TABLET DAILY 8/20/23  Yes Ricardo El MD   buPROPion

## 2023-09-14 LAB — LEFT VENTRICULAR EJECTION FRACTION, EXTERNAL: NORMAL

## 2023-10-09 ENCOUNTER — OFFICE VISIT (OUTPATIENT)
Age: 78
End: 2023-10-09
Payer: MEDICARE

## 2023-10-09 VITALS
DIASTOLIC BLOOD PRESSURE: 74 MMHG | RESPIRATION RATE: 12 BRPM | WEIGHT: 231 LBS | OXYGEN SATURATION: 99 % | SYSTOLIC BLOOD PRESSURE: 124 MMHG | HEART RATE: 64 BPM | TEMPERATURE: 97.6 F | HEIGHT: 73 IN | BODY MASS INDEX: 30.62 KG/M2

## 2023-10-09 DIAGNOSIS — D56.9 THALASSEMIA, UNSPECIFIED TYPE: ICD-10-CM

## 2023-10-09 DIAGNOSIS — F33.1 MAJOR DEPRESSIVE DISORDER, RECURRENT, MODERATE (HCC): ICD-10-CM

## 2023-10-09 DIAGNOSIS — I10 HTN (HYPERTENSION), BENIGN: ICD-10-CM

## 2023-10-09 DIAGNOSIS — E78.2 MIXED HYPERLIPIDEMIA: ICD-10-CM

## 2023-10-09 DIAGNOSIS — I10 ESSENTIAL (PRIMARY) HYPERTENSION: ICD-10-CM

## 2023-10-09 DIAGNOSIS — I25.10 CORONARY ARTERY DISEASE INVOLVING NATIVE CORONARY ARTERY OF NATIVE HEART WITHOUT ANGINA PECTORIS: ICD-10-CM

## 2023-10-09 DIAGNOSIS — E11.8 TYPE 2 DIABETES MELLITUS WITH UNSPECIFIED COMPLICATIONS (HCC): Primary | ICD-10-CM

## 2023-10-09 LAB
ALBUMIN SERPL-MCNC: 4 G/DL (ref 3.5–5)
ALBUMIN/GLOB SERPL: 1.5 (ref 1.1–2.2)
ALP SERPL-CCNC: 71 U/L (ref 45–117)
ALT SERPL-CCNC: 40 U/L (ref 12–78)
ANION GAP SERPL CALC-SCNC: 7 MMOL/L (ref 5–15)
AST SERPL-CCNC: 15 U/L (ref 15–37)
BASOPHILS # BLD: 0.1 K/UL (ref 0–0.1)
BASOPHILS NFR BLD: 1 % (ref 0–1)
BILIRUB SERPL-MCNC: 1.1 MG/DL (ref 0.2–1)
BUN SERPL-MCNC: 26 MG/DL (ref 6–20)
BUN/CREAT SERPL: 20 (ref 12–20)
CALCIUM SERPL-MCNC: 9 MG/DL (ref 8.5–10.1)
CHLORIDE SERPL-SCNC: 105 MMOL/L (ref 97–108)
CO2 SERPL-SCNC: 28 MMOL/L (ref 21–32)
CREAT SERPL-MCNC: 1.32 MG/DL (ref 0.7–1.3)
DIFFERENTIAL METHOD BLD: ABNORMAL
EOSINOPHIL # BLD: 0.2 K/UL (ref 0–0.4)
EOSINOPHIL NFR BLD: 3 % (ref 0–7)
ERYTHROCYTE [DISTWIDTH] IN BLOOD BY AUTOMATED COUNT: 17.2 % (ref 11.5–14.5)
EST. AVERAGE GLUCOSE BLD GHB EST-MCNC: 197 MG/DL
GLOBULIN SER CALC-MCNC: 2.6 G/DL (ref 2–4)
GLUCOSE SERPL-MCNC: 187 MG/DL (ref 65–100)
HBA1C MFR BLD: 8.5 % (ref 4–5.6)
HCT VFR BLD AUTO: 30 % (ref 36.6–50.3)
HGB BLD-MCNC: 9.3 G/DL (ref 12.1–17)
IMM GRANULOCYTES # BLD AUTO: 0 K/UL (ref 0–0.04)
IMM GRANULOCYTES NFR BLD AUTO: 0 % (ref 0–0.5)
LYMPHOCYTES # BLD: 1.9 K/UL (ref 0.8–3.5)
LYMPHOCYTES NFR BLD: 29 % (ref 12–49)
MCH RBC QN AUTO: 18.4 PG (ref 26–34)
MCHC RBC AUTO-ENTMCNC: 31 G/DL (ref 30–36.5)
MCV RBC AUTO: 59.3 FL (ref 80–99)
MONOCYTES # BLD: 0.4 K/UL (ref 0–1)
MONOCYTES NFR BLD: 6 % (ref 5–13)
NEUTS SEG # BLD: 4 K/UL (ref 1.8–8)
NEUTS SEG NFR BLD: 61 % (ref 32–75)
NRBC # BLD: 0 K/UL (ref 0–0.01)
NRBC BLD-RTO: 0 PER 100 WBC
PLATELET # BLD AUTO: 218 K/UL (ref 150–400)
POTASSIUM SERPL-SCNC: 3.5 MMOL/L (ref 3.5–5.1)
PROT SERPL-MCNC: 6.6 G/DL (ref 6.4–8.2)
RBC # BLD AUTO: 5.06 M/UL (ref 4.1–5.7)
RBC MORPH BLD: ABNORMAL
SODIUM SERPL-SCNC: 140 MMOL/L (ref 136–145)
TSH SERPL DL<=0.05 MIU/L-ACNC: 1.71 UIU/ML (ref 0.36–3.74)
WBC # BLD AUTO: 6.6 K/UL (ref 4.1–11.1)

## 2023-10-09 PROCEDURE — G8417 CALC BMI ABV UP PARAM F/U: HCPCS | Performed by: INTERNAL MEDICINE

## 2023-10-09 PROCEDURE — 1123F ACP DISCUSS/DSCN MKR DOCD: CPT | Performed by: INTERNAL MEDICINE

## 2023-10-09 PROCEDURE — 3078F DIAST BP <80 MM HG: CPT | Performed by: INTERNAL MEDICINE

## 2023-10-09 PROCEDURE — 99214 OFFICE O/P EST MOD 30 MIN: CPT | Performed by: INTERNAL MEDICINE

## 2023-10-09 PROCEDURE — G8427 DOCREV CUR MEDS BY ELIG CLIN: HCPCS | Performed by: INTERNAL MEDICINE

## 2023-10-09 PROCEDURE — G8484 FLU IMMUNIZE NO ADMIN: HCPCS | Performed by: INTERNAL MEDICINE

## 2023-10-09 PROCEDURE — 3074F SYST BP LT 130 MM HG: CPT | Performed by: INTERNAL MEDICINE

## 2023-10-09 PROCEDURE — 1036F TOBACCO NON-USER: CPT | Performed by: INTERNAL MEDICINE

## 2023-10-09 PROCEDURE — 3051F HG A1C>EQUAL 7.0%<8.0%: CPT | Performed by: INTERNAL MEDICINE

## 2023-10-09 NOTE — PROGRESS NOTES
HPI:  Mayra Yan is a 66y.o. year old male who is here for a follow-up visit. His depression and anxiety are improved with increase Lexapro. He is sleeping reasonably well. He does have some increased issues with fatigue and shortness of breath. He recently saw his cardiologist and had a stress test as well as an echocardiogram both of which were unremarkable. He denies any PND or orthopnea. Denies nausea or vomiting. Denies any chest pain. Past Medical History:   Diagnosis Date    CAD (coronary artery disease) 01/23/2017    stent    DJD (degenerative joint disease) of knee 11/2013    left TKR    ED (erectile dysfunction)     Essential hypertension     Hyperlipidemia     T2DM (type 2 diabetes mellitus) ([address])     Thalassemia minor        Past Surgical History:   Procedure Laterality Date    CABG, ARTERY-VEIN, SINGLE  01/19/2017    DAVINCI JEREMÍAS TAKEDOWN, LEFT ANTERIOR THORACOTOMY, OFF PUMP CORONARY ARTERY BYPASS GRAFTING X1,    CARDIAC CATHETERIZATION  2004    mild CAD, EF 60-65%    COLONOSCOPY N/A 6/1/2021    COLONOSCOPY,EGD   :- performed by Tyra Villegas MD at [facility] ARTHROSCOPY Left 11/2013    SHOULDER ARTHROPLASTY Right 1968    STRESS TEST, LEXISCAN  6/30/11    4 min, normal perfusion EF 50%    STRESS TEST, LEXISCAN  5/14/2004    5 min, inferior ischemia, EF 60%    TOTAL KNEE ARTHROPLASTY Left 2013    TURP  02/20/2019       Prior to Admission medications    Medication Sig Start Date End Date Taking?  Authorizing Provider   Prenatal Vit-Fe Fumarate-FA (M-VIT PO) Take by mouth   Yes Provider, MD Cora   chlorthalidone (HYGROTON) 25 MG tablet  8/21/23  Yes Provider, MD Cora   escitalopram (LEXAPRO) 20 MG tablet Take 1.5 tablets by mouth daily 8/29/23  Yes Tor Manjarrez MD   tamsulosin [facility]) 0.4 MG capsule Take 1 capsule by mouth daily 8/29/23  Yes Tor Manjarrez MD   finasteride (PROSCAR) 5 MG tablet Take 1 tablet by

## 2023-10-10 ENCOUNTER — TELEPHONE (OUTPATIENT)
Age: 78
End: 2023-10-10

## 2023-10-10 DIAGNOSIS — R06.09 DOE (DYSPNEA ON EXERTION): Primary | ICD-10-CM

## 2023-10-10 RX ORDER — ROSUVASTATIN CALCIUM 10 MG/1
10 TABLET, COATED ORAL DAILY
Qty: 90 TABLET | Refills: 3 | Status: SHIPPED | OUTPATIENT
Start: 2023-10-10

## 2023-10-10 NOTE — TELEPHONE ENCOUNTER
Orders Placed This Encounter    XR CHEST (2 VIEWS)     Standing Status:   Future     Standing Expiration Date:   10/10/2024     Order Specific Question:   Reason for exam:     Answer:   RODRIGUES    Spirometry With Bronchodilator     Standing Status:   Future     Standing Expiration Date:   10/10/2024     Scheduling Instructions:      PFT's for Dx:  RODRIGUES    rosuvastatin (CRESTOR) 10 MG tablet     Sig: TAKE 1 TABLET DAILY     Dispense:  90 tablet     Refill:  3       The above orders were approved via VORB per Dr. Shawn Lewis, III.

## 2023-10-10 NOTE — TELEPHONE ENCOUNTER
----- Message from Amber Temple MD sent at 10/10/2023  7:36 AM EDT -----  Notify kidney tests are higher and sugars are as well. Need to consider the addition of Jardiance or farxiga to help with these. Also obtain pft's and CXR for RODRIGUES.

## 2023-10-10 NOTE — RESULT ENCOUNTER NOTE
Notify kidney tests are higher and sugars are as well. Need to consider the addition of Jardiance or farxiga to help with these. Also obtain pft's and CXR for RODRIGUES.

## 2023-10-10 NOTE — TELEPHONE ENCOUNTER
Spoke with pt, verified Idx2. Reviewed SRJ result note. Pt will call his pharmacy coverage to check which medication is preferred then let us know where to send rx. Pt instructed on where to have CXR completed. Advised order has been faxed to [address] [place] [address] Drive for PFT's and that someone should be calling him to schedule. PAR # provided and instructed pt if he does not hear from them in the next 1-2 weeks to call them directly to schedule. Pt voiced understanding.

## 2023-10-12 ENCOUNTER — TELEPHONE (OUTPATIENT)
Age: 78
End: 2023-10-12

## 2023-10-12 NOTE — TELEPHONE ENCOUNTER
Reason for call:  Spoke with pt. Pt requested a call back from nurse in regards medication Jardiance.     Is this a new problem: Yes    Date of last appointment:  10/9/2023     Can we respond via Roovynt: No    Best call back number: Bhargavi Elliott  [phone]

## 2023-10-12 NOTE — TELEPHONE ENCOUNTER
Pt states he checked w/ his pharmacy benefits and Jong Keller is not covered - the preferred is Jardiance. Rx sent to mail order per pt request.    Orders Placed This Encounter    empagliflozin (JARDIANCE) 10 MG tablet     Sig: Take 1 tablet by mouth daily     Dispense:  90 tablet     Refill:  1       The above orders were approved via VORB per Dr. Fara Klein, III.

## 2023-10-16 ENCOUNTER — HOSPITAL ENCOUNTER (OUTPATIENT)
Facility: HOSPITAL | Age: 78
Discharge: HOME | End: 2023-10-19
Payer: MEDICARE

## 2023-10-16 DIAGNOSIS — R06.09 DOE (DYSPNEA ON EXERTION): ICD-10-CM

## 2023-10-16 PROCEDURE — 71046 X-RAY EXAM CHEST 2 VIEWS: CPT

## 2023-12-26 RX ORDER — LISINOPRIL 40 MG/1
40 TABLET ORAL DAILY
Qty: 90 TABLET | Refills: 3 | Status: SHIPPED | OUTPATIENT
Start: 2023-12-26

## 2023-12-26 RX ORDER — CARVEDILOL 6.25 MG/1
6.25 TABLET ORAL 2 TIMES DAILY
Qty: 180 TABLET | Refills: 3 | Status: SHIPPED | OUTPATIENT
Start: 2023-12-26

## 2023-12-26 NOTE — TELEPHONE ENCOUNTER
Chief Complaint   Patient presents with    Medication Refill     Last Appointment with Dr. Carly Aguillon:  8/4/2023   Future Appointments   Date Time Provider [address]   1/17/2024  7:30 AM Steve Baron MD [facility] AMB

## 2024-01-07 DIAGNOSIS — R06.09 DOE (DYSPNEA ON EXERTION): ICD-10-CM

## 2024-01-17 ENCOUNTER — OFFICE VISIT (OUTPATIENT)
Age: 79
End: 2024-01-17
Payer: MEDICARE

## 2024-01-17 VITALS
WEIGHT: 229 LBS | HEART RATE: 60 BPM | DIASTOLIC BLOOD PRESSURE: 68 MMHG | HEIGHT: 73 IN | OXYGEN SATURATION: 100 % | TEMPERATURE: 98.2 F | RESPIRATION RATE: 15 BRPM | BODY MASS INDEX: 30.35 KG/M2 | SYSTOLIC BLOOD PRESSURE: 102 MMHG

## 2024-01-17 DIAGNOSIS — E78.2 MIXED HYPERLIPIDEMIA: ICD-10-CM

## 2024-01-17 DIAGNOSIS — F33.1 MAJOR DEPRESSIVE DISORDER, RECURRENT, MODERATE (HCC): ICD-10-CM

## 2024-01-17 DIAGNOSIS — R06.09 DOE (DYSPNEA ON EXERTION): ICD-10-CM

## 2024-01-17 DIAGNOSIS — E11.8 TYPE 2 DIABETES MELLITUS WITH UNSPECIFIED COMPLICATIONS (HCC): ICD-10-CM

## 2024-01-17 DIAGNOSIS — E11.8 TYPE 2 DIABETES MELLITUS WITH UNSPECIFIED COMPLICATIONS (HCC): Primary | ICD-10-CM

## 2024-01-17 DIAGNOSIS — I25.10 CORONARY ARTERY DISEASE INVOLVING NATIVE CORONARY ARTERY OF NATIVE HEART WITHOUT ANGINA PECTORIS: ICD-10-CM

## 2024-01-17 DIAGNOSIS — I10 ESSENTIAL (PRIMARY) HYPERTENSION: ICD-10-CM

## 2024-01-17 DIAGNOSIS — I50.22 CHRONIC SYSTOLIC (CONGESTIVE) HEART FAILURE (HCC): ICD-10-CM

## 2024-01-17 PROCEDURE — G8417 CALC BMI ABV UP PARAM F/U: HCPCS | Performed by: INTERNAL MEDICINE

## 2024-01-17 PROCEDURE — 1036F TOBACCO NON-USER: CPT | Performed by: INTERNAL MEDICINE

## 2024-01-17 PROCEDURE — 3078F DIAST BP <80 MM HG: CPT | Performed by: INTERNAL MEDICINE

## 2024-01-17 PROCEDURE — G8484 FLU IMMUNIZE NO ADMIN: HCPCS | Performed by: INTERNAL MEDICINE

## 2024-01-17 PROCEDURE — 3074F SYST BP LT 130 MM HG: CPT | Performed by: INTERNAL MEDICINE

## 2024-01-17 PROCEDURE — 99214 OFFICE O/P EST MOD 30 MIN: CPT | Performed by: INTERNAL MEDICINE

## 2024-01-17 PROCEDURE — G8427 DOCREV CUR MEDS BY ELIG CLIN: HCPCS | Performed by: INTERNAL MEDICINE

## 2024-01-17 PROCEDURE — 1123F ACP DISCUSS/DSCN MKR DOCD: CPT | Performed by: INTERNAL MEDICINE

## 2024-01-17 ASSESSMENT — PATIENT HEALTH QUESTIONNAIRE - PHQ9
7. TROUBLE CONCENTRATING ON THINGS, SUCH AS READING THE NEWSPAPER OR WATCHING TELEVISION: 0
SUM OF ALL RESPONSES TO PHQ QUESTIONS 1-9: 1
8. MOVING OR SPEAKING SO SLOWLY THAT OTHER PEOPLE COULD HAVE NOTICED. OR THE OPPOSITE, BEING SO FIGETY OR RESTLESS THAT YOU HAVE BEEN MOVING AROUND A LOT MORE THAN USUAL: 0
6. FEELING BAD ABOUT YOURSELF - OR THAT YOU ARE A FAILURE OR HAVE LET YOURSELF OR YOUR FAMILY DOWN: 0
SUM OF ALL RESPONSES TO PHQ QUESTIONS 1-9: 1
SUM OF ALL RESPONSES TO PHQ9 QUESTIONS 1 & 2: 0
2. FEELING DOWN, DEPRESSED OR HOPELESS: 0
5. POOR APPETITE OR OVEREATING: 0
SUM OF ALL RESPONSES TO PHQ QUESTIONS 1-9: 1
4. FEELING TIRED OR HAVING LITTLE ENERGY: 1
3. TROUBLE FALLING OR STAYING ASLEEP: 0
9. THOUGHTS THAT YOU WOULD BE BETTER OFF DEAD, OR OF HURTING YOURSELF: 0
10. IF YOU CHECKED OFF ANY PROBLEMS, HOW DIFFICULT HAVE THESE PROBLEMS MADE IT FOR YOU TO DO YOUR WORK, TAKE CARE OF THINGS AT HOME, OR GET ALONG WITH OTHER PEOPLE: 0
SUM OF ALL RESPONSES TO PHQ QUESTIONS 1-9: 1
1. LITTLE INTEREST OR PLEASURE IN DOING THINGS: 0

## 2024-01-17 NOTE — PROGRESS NOTES
JOSHUA, APRN - NP   Prenatal Vit-Fe Fumarate-FA (M-VIT PO) Take by mouth   Yes Cora Todd MD   chlorthalidone (HYGROTON) 25 MG tablet  23  Yes Cora Todd MD   escitalopram (LEXAPRO) 20 MG tablet Take 1.5 tablets by mouth daily 23  Yes Jason Daly MD   tamsulosin (FLOMAX) 0.4 MG capsule Take 1 capsule by mouth daily 23  Yes Jason Daly MD   finasteride (PROSCAR) 5 MG tablet Take 1 tablet by mouth daily 23  Yes Jason aDly MD   glipiZIDE (GLUCOTROL) 5 MG tablet TAKE 1 TABLET DAILY 23  Yes Jason Daly MD   buPROPion (WELLBUTRIN XL) 150 MG extended release tablet Take 1 tablet by mouth every morning 23  Yes Akash Espino APRN - NP   acetaminophen (TYLENOL) 500 MG tablet Take 2 tablets by mouth every 6 hours as needed   Yes Cora Todd MD   aspirin 81 MG EC tablet Take 1 tablet by mouth daily   Yes Cora Todd MD   cyclobenzaprine (FLEXERIL) 10 MG tablet Take 1 tablet by mouth 3 times daily as needed  Patient not taking: Reported on 2024 10/15/21   Cora Todd MD       Social History     Socioeconomic History    Marital status:      Spouse name: Not on file    Number of children: Not on file    Years of education: Not on file    Highest education level: Not on file   Occupational History    Not on file   Tobacco Use    Smoking status: Former     Current packs/day: 0.00     Average packs/day: 2.0 packs/day for 15.0 years (30.0 ttl pk-yrs)     Types: Cigarettes, Pipe, Cigars     Start date: 1966     Quit date: 1981     Years since quittin.2    Smokeless tobacco: Never   Substance and Sexual Activity    Alcohol use: Yes     Comment: Extremely moderate    Drug use: No    Sexual activity: Not on file   Other Topics Concern    Not on file   Social History Narrative    Not on file     Social Determinants of Health     Financial Resource Strain: Low Risk  (5/10/2023)    Overall Financial Resource

## 2024-01-18 LAB
ALBUMIN SERPL-MCNC: 3.8 G/DL (ref 3.5–5)
ALBUMIN/GLOB SERPL: 1.4 (ref 1.1–2.2)
ALP SERPL-CCNC: 73 U/L (ref 45–117)
ALT SERPL-CCNC: 61 U/L (ref 12–78)
ANION GAP SERPL CALC-SCNC: 7 MMOL/L (ref 5–15)
AST SERPL-CCNC: 32 U/L (ref 15–37)
BASOPHILS # BLD: 0 K/UL (ref 0–0.1)
BASOPHILS NFR BLD: 0 % (ref 0–1)
BILIRUB SERPL-MCNC: 0.8 MG/DL (ref 0.2–1)
BUN SERPL-MCNC: 23 MG/DL (ref 6–20)
BUN/CREAT SERPL: 17 (ref 12–20)
CALCIUM SERPL-MCNC: 8.9 MG/DL (ref 8.5–10.1)
CHLORIDE SERPL-SCNC: 106 MMOL/L (ref 97–108)
CHOLEST SERPL-MCNC: 120 MG/DL
CO2 SERPL-SCNC: 28 MMOL/L (ref 21–32)
CREAT SERPL-MCNC: 1.32 MG/DL (ref 0.7–1.3)
DIFFERENTIAL METHOD BLD: ABNORMAL
EOSINOPHIL # BLD: 0.1 K/UL (ref 0–0.4)
EOSINOPHIL NFR BLD: 2 % (ref 0–7)
ERYTHROCYTE [DISTWIDTH] IN BLOOD BY AUTOMATED COUNT: 16.9 % (ref 11.5–14.5)
EST. AVERAGE GLUCOSE BLD GHB EST-MCNC: 154 MG/DL
GLOBULIN SER CALC-MCNC: 2.8 G/DL (ref 2–4)
GLUCOSE SERPL-MCNC: 192 MG/DL (ref 65–100)
HBA1C MFR BLD: 7 % (ref 4–5.6)
HCT VFR BLD AUTO: 33.6 % (ref 36.6–50.3)
HDLC SERPL-MCNC: 40 MG/DL
HDLC SERPL: 3 (ref 0–5)
HGB BLD-MCNC: 10.3 G/DL (ref 12.1–17)
IMM GRANULOCYTES # BLD AUTO: 0 K/UL (ref 0–0.04)
IMM GRANULOCYTES NFR BLD AUTO: 0 % (ref 0–0.5)
LDLC SERPL CALC-MCNC: 63.2 MG/DL (ref 0–100)
LYMPHOCYTES # BLD: 2.1 K/UL (ref 0.8–3.5)
LYMPHOCYTES NFR BLD: 28 % (ref 12–49)
MCH RBC QN AUTO: 19.3 PG (ref 26–34)
MCHC RBC AUTO-ENTMCNC: 30.7 G/DL (ref 30–36.5)
MCV RBC AUTO: 63 FL (ref 80–99)
MONOCYTES # BLD: 0.5 K/UL (ref 0–1)
MONOCYTES NFR BLD: 7 % (ref 5–13)
NEUTS SEG # BLD: 4.7 K/UL (ref 1.8–8)
NEUTS SEG NFR BLD: 63 % (ref 32–75)
NRBC # BLD: 0 K/UL (ref 0–0.01)
NRBC BLD-RTO: 0 PER 100 WBC
PLATELET # BLD AUTO: 236 K/UL (ref 150–400)
PMV BLD AUTO: 10.3 FL (ref 8.9–12.9)
POTASSIUM SERPL-SCNC: 3.7 MMOL/L (ref 3.5–5.1)
PROT SERPL-MCNC: 6.6 G/DL (ref 6.4–8.2)
RBC # BLD AUTO: 5.33 M/UL (ref 4.1–5.7)
RBC MORPH BLD: ABNORMAL
SODIUM SERPL-SCNC: 141 MMOL/L (ref 136–145)
TRIGL SERPL-MCNC: 84 MG/DL
VLDLC SERPL CALC-MCNC: 16.8 MG/DL
WBC # BLD AUTO: 7.4 K/UL (ref 4.1–11.1)

## 2024-01-29 RX ORDER — BUPROPION HYDROCHLORIDE 150 MG/1
150 TABLET ORAL EVERY MORNING
Qty: 90 TABLET | Refills: 3 | Status: SHIPPED | OUTPATIENT
Start: 2024-01-29

## 2024-01-29 NOTE — TELEPHONE ENCOUNTER
Chief Complaint   Patient presents with    Medication Refill     Last Appointment with Dr. Jason Daly:  8/4/2023   Future Appointments   Date Time Provider Department Center   7/18/2024  8:00 AM Jason Daly MD [facility]

## 2024-03-17 RX ORDER — EMPAGLIFLOZIN 10 MG/1
10 TABLET, FILM COATED ORAL DAILY
Qty: 90 TABLET | Refills: 3 | Status: SHIPPED | OUTPATIENT
Start: 2024-03-17

## 2024-05-01 ENCOUNTER — OFFICE VISIT (OUTPATIENT)
Age: 79
End: 2024-05-01
Payer: MEDICARE

## 2024-05-01 VITALS
DIASTOLIC BLOOD PRESSURE: 62 MMHG | BODY MASS INDEX: 29.85 KG/M2 | HEIGHT: 73 IN | TEMPERATURE: 98.1 F | SYSTOLIC BLOOD PRESSURE: 97 MMHG | RESPIRATION RATE: 16 BRPM | HEART RATE: 66 BPM | WEIGHT: 225.2 LBS | OXYGEN SATURATION: 95 %

## 2024-05-01 DIAGNOSIS — L03.012 ACUTE PARONYCHIA OF LEFT THUMB: Primary | ICD-10-CM

## 2024-05-01 PROCEDURE — 99213 OFFICE O/P EST LOW 20 MIN: CPT | Performed by: NURSE PRACTITIONER

## 2024-05-01 PROCEDURE — 3078F DIAST BP <80 MM HG: CPT | Performed by: NURSE PRACTITIONER

## 2024-05-01 PROCEDURE — 1036F TOBACCO NON-USER: CPT | Performed by: NURSE PRACTITIONER

## 2024-05-01 PROCEDURE — G8417 CALC BMI ABV UP PARAM F/U: HCPCS | Performed by: NURSE PRACTITIONER

## 2024-05-01 PROCEDURE — 1123F ACP DISCUSS/DSCN MKR DOCD: CPT | Performed by: NURSE PRACTITIONER

## 2024-05-01 PROCEDURE — 3074F SYST BP LT 130 MM HG: CPT | Performed by: NURSE PRACTITIONER

## 2024-05-01 PROCEDURE — G8427 DOCREV CUR MEDS BY ELIG CLIN: HCPCS | Performed by: NURSE PRACTITIONER

## 2024-05-01 RX ORDER — DOXYCYCLINE HYCLATE 100 MG
100 TABLET ORAL 2 TIMES DAILY
Qty: 20 TABLET | Refills: 0 | Status: SHIPPED | OUTPATIENT
Start: 2024-05-01 | End: 2024-05-11

## 2024-05-01 RX ORDER — HYDROCODONE BITARTRATE AND ACETAMINOPHEN 5; 325 MG/1; MG/1
TABLET ORAL
COMMUNITY
Start: 2024-04-24

## 2024-05-01 RX ORDER — AMLODIPINE BESYLATE 10 MG/1
TABLET ORAL
COMMUNITY
Start: 2024-04-28

## 2024-05-01 ASSESSMENT — PATIENT HEALTH QUESTIONNAIRE - PHQ9
9. THOUGHTS THAT YOU WOULD BE BETTER OFF DEAD, OR OF HURTING YOURSELF: NOT AT ALL
6. FEELING BAD ABOUT YOURSELF - OR THAT YOU ARE A FAILURE OR HAVE LET YOURSELF OR YOUR FAMILY DOWN: NOT AT ALL
SUM OF ALL RESPONSES TO PHQ QUESTIONS 1-9: 0
10. IF YOU CHECKED OFF ANY PROBLEMS, HOW DIFFICULT HAVE THESE PROBLEMS MADE IT FOR YOU TO DO YOUR WORK, TAKE CARE OF THINGS AT HOME, OR GET ALONG WITH OTHER PEOPLE: NOT DIFFICULT AT ALL
SUM OF ALL RESPONSES TO PHQ QUESTIONS 1-9: 0
1. LITTLE INTEREST OR PLEASURE IN DOING THINGS: NOT AT ALL
SUM OF ALL RESPONSES TO PHQ QUESTIONS 1-9: 0
5. POOR APPETITE OR OVEREATING: NOT AT ALL
4. FEELING TIRED OR HAVING LITTLE ENERGY: NOT AT ALL
2. FEELING DOWN, DEPRESSED OR HOPELESS: NOT AT ALL
SUM OF ALL RESPONSES TO PHQ9 QUESTIONS 1 & 2: 0
3. TROUBLE FALLING OR STAYING ASLEEP: NOT AT ALL
SUM OF ALL RESPONSES TO PHQ QUESTIONS 1-9: 0
8. MOVING OR SPEAKING SO SLOWLY THAT OTHER PEOPLE COULD HAVE NOTICED. OR THE OPPOSITE, BEING SO FIGETY OR RESTLESS THAT YOU HAVE BEEN MOVING AROUND A LOT MORE THAN USUAL: NOT AT ALL
7. TROUBLE CONCENTRATING ON THINGS, SUCH AS READING THE NEWSPAPER OR WATCHING TELEVISION: NOT AT ALL

## 2024-05-01 NOTE — PROGRESS NOTES
Josh Almodovar (:  1945) is a 79 y.o. male,here for evaluation of the following chief complaint(s):  Other (Left thumb seem infected /Started 3 days ago )    BP 97/62   Pulse 66   Temp 98.1 °F (36.7 °C) (Oral)   Resp 16   Ht 1.854 m (6' 1\")   Wt 102.2 kg (225 lb 3.2 oz)   SpO2 95%   BMI 29.71 kg/m²       SUBJECTIVE/OBJECTIVE:    HPI:Patient reports left thumb pain, swelling, and redness worsening over the past 3 days. No drainage. No fever. He thinks he may have cut his cuticle when he trimmed his nail.    Review of Systems   Skin:         Infected left thumb       Physical Exam  Skin:     Comments: Left thumb lateral nail fold with pain, erythema, swelling                ASSESSMENT/PLAN:  1. Acute paronychia of left thumb  Warm water and epsom salt soaks 4 times daily for 20 minutes  Follow-up if no improvement over the next 3-5 days  Antibiotic as prescribed-cautioned on sun sensitivity    --DHIRAJ Boggs - NP

## 2024-07-17 LAB
LEFT VENTRICULAR EJECTION FRACTION HIGH VALUE: 56 %
LEFT VENTRICULAR EJECTION FRACTION MODE: NORMAL
LV EF: 46 %

## 2024-07-18 ENCOUNTER — OFFICE VISIT (OUTPATIENT)
Age: 79
End: 2024-07-18
Payer: MEDICARE

## 2024-07-18 VITALS
SYSTOLIC BLOOD PRESSURE: 132 MMHG | WEIGHT: 224.8 LBS | TEMPERATURE: 98 F | HEIGHT: 73 IN | RESPIRATION RATE: 15 BRPM | HEART RATE: 58 BPM | OXYGEN SATURATION: 99 % | BODY MASS INDEX: 29.79 KG/M2 | DIASTOLIC BLOOD PRESSURE: 77 MMHG

## 2024-07-18 DIAGNOSIS — R06.09 DOE (DYSPNEA ON EXERTION): ICD-10-CM

## 2024-07-18 DIAGNOSIS — F33.1 MAJOR DEPRESSIVE DISORDER, RECURRENT, MODERATE (HCC): ICD-10-CM

## 2024-07-18 DIAGNOSIS — E11.8 TYPE 2 DIABETES MELLITUS WITH UNSPECIFIED COMPLICATIONS (HCC): Primary | ICD-10-CM

## 2024-07-18 DIAGNOSIS — E11.8 TYPE 2 DIABETES MELLITUS WITH UNSPECIFIED COMPLICATIONS (HCC): ICD-10-CM

## 2024-07-18 DIAGNOSIS — I10 ESSENTIAL (PRIMARY) HYPERTENSION: ICD-10-CM

## 2024-07-18 DIAGNOSIS — I25.10 CORONARY ARTERY DISEASE INVOLVING NATIVE CORONARY ARTERY OF NATIVE HEART WITHOUT ANGINA PECTORIS: ICD-10-CM

## 2024-07-18 DIAGNOSIS — Z91.81 AT HIGH RISK FOR FALLS: ICD-10-CM

## 2024-07-18 DIAGNOSIS — E78.2 MIXED HYPERLIPIDEMIA: ICD-10-CM

## 2024-07-18 LAB
ALBUMIN SERPL-MCNC: 3.9 G/DL (ref 3.5–5)
ALBUMIN/GLOB SERPL: 1.4 (ref 1.1–2.2)
ALP SERPL-CCNC: 72 U/L (ref 45–117)
ALT SERPL-CCNC: 39 U/L (ref 12–78)
ANION GAP SERPL CALC-SCNC: 8 MMOL/L (ref 5–15)
AST SERPL-CCNC: 20 U/L (ref 15–37)
BASOPHILS # BLD: 0 K/UL (ref 0–0.1)
BASOPHILS NFR BLD: 0 % (ref 0–1)
BILIRUB SERPL-MCNC: 1.1 MG/DL (ref 0.2–1)
BUN SERPL-MCNC: 18 MG/DL (ref 6–20)
BUN/CREAT SERPL: 16 (ref 12–20)
CALCIUM SERPL-MCNC: 9.2 MG/DL (ref 8.5–10.1)
CHLORIDE SERPL-SCNC: 103 MMOL/L (ref 97–108)
CO2 SERPL-SCNC: 30 MMOL/L (ref 21–32)
CREAT SERPL-MCNC: 1.16 MG/DL (ref 0.7–1.3)
DIFFERENTIAL METHOD BLD: ABNORMAL
EOSINOPHIL # BLD: 0.1 K/UL (ref 0–0.4)
EOSINOPHIL NFR BLD: 2 % (ref 0–7)
ERYTHROCYTE [DISTWIDTH] IN BLOOD BY AUTOMATED COUNT: 18.8 % (ref 11.5–14.5)
EST. AVERAGE GLUCOSE BLD GHB EST-MCNC: 169 MG/DL
GLOBULIN SER CALC-MCNC: 2.7 G/DL (ref 2–4)
GLUCOSE SERPL-MCNC: 178 MG/DL (ref 65–100)
HBA1C MFR BLD: 7.5 % (ref 4–5.6)
HCT VFR BLD AUTO: 32.9 % (ref 36.6–50.3)
HGB BLD-MCNC: 10.5 G/DL (ref 12.1–17)
IMM GRANULOCYTES # BLD AUTO: 0 K/UL (ref 0–0.04)
IMM GRANULOCYTES NFR BLD AUTO: 0 % (ref 0–0.5)
LYMPHOCYTES # BLD: 1.9 K/UL (ref 0.8–3.5)
LYMPHOCYTES NFR BLD: 27 % (ref 12–49)
MCH RBC QN AUTO: 19.3 PG (ref 26–34)
MCHC RBC AUTO-ENTMCNC: 31.9 G/DL (ref 30–36.5)
MCV RBC AUTO: 60.5 FL (ref 80–99)
MONOCYTES # BLD: 0.5 K/UL (ref 0–1)
MONOCYTES NFR BLD: 7 % (ref 5–13)
NEUTS SEG # BLD: 4.7 K/UL (ref 1.8–8)
NEUTS SEG NFR BLD: 64 % (ref 32–75)
NRBC # BLD: 0 K/UL (ref 0–0.01)
NRBC BLD-RTO: 0 PER 100 WBC
PLATELET # BLD AUTO: 225 K/UL (ref 150–400)
POTASSIUM SERPL-SCNC: 3 MMOL/L (ref 3.5–5.1)
PROT SERPL-MCNC: 6.6 G/DL (ref 6.4–8.2)
RBC # BLD AUTO: 5.44 M/UL (ref 4.1–5.7)
RBC MORPH BLD: ABNORMAL
RBC MORPH BLD: ABNORMAL
SODIUM SERPL-SCNC: 141 MMOL/L (ref 136–145)
WBC # BLD AUTO: 7.2 K/UL (ref 4.1–11.1)

## 2024-07-18 PROCEDURE — G8427 DOCREV CUR MEDS BY ELIG CLIN: HCPCS | Performed by: INTERNAL MEDICINE

## 2024-07-18 PROCEDURE — 1123F ACP DISCUSS/DSCN MKR DOCD: CPT | Performed by: INTERNAL MEDICINE

## 2024-07-18 PROCEDURE — 3078F DIAST BP <80 MM HG: CPT | Performed by: INTERNAL MEDICINE

## 2024-07-18 PROCEDURE — 99214 OFFICE O/P EST MOD 30 MIN: CPT | Performed by: INTERNAL MEDICINE

## 2024-07-18 PROCEDURE — 1036F TOBACCO NON-USER: CPT | Performed by: INTERNAL MEDICINE

## 2024-07-18 PROCEDURE — 3075F SYST BP GE 130 - 139MM HG: CPT | Performed by: INTERNAL MEDICINE

## 2024-07-18 PROCEDURE — 3051F HG A1C>EQUAL 7.0%<8.0%: CPT | Performed by: INTERNAL MEDICINE

## 2024-07-18 PROCEDURE — G8417 CALC BMI ABV UP PARAM F/U: HCPCS | Performed by: INTERNAL MEDICINE

## 2024-07-18 RX ORDER — BUPROPION HYDROCHLORIDE 300 MG/1
300 TABLET ORAL EVERY MORNING
Qty: 90 TABLET | Refills: 2 | Status: SHIPPED | OUTPATIENT
Start: 2024-07-18

## 2024-07-18 SDOH — ECONOMIC STABILITY: FOOD INSECURITY: WITHIN THE PAST 12 MONTHS, YOU WORRIED THAT YOUR FOOD WOULD RUN OUT BEFORE YOU GOT MONEY TO BUY MORE.: NEVER TRUE

## 2024-07-18 SDOH — ECONOMIC STABILITY: FOOD INSECURITY: WITHIN THE PAST 12 MONTHS, THE FOOD YOU BOUGHT JUST DIDN'T LAST AND YOU DIDN'T HAVE MONEY TO GET MORE.: NEVER TRUE

## 2024-07-18 SDOH — ECONOMIC STABILITY: INCOME INSECURITY: HOW HARD IS IT FOR YOU TO PAY FOR THE VERY BASICS LIKE FOOD, HOUSING, MEDICAL CARE, AND HEATING?: NOT HARD AT ALL

## 2024-07-18 NOTE — PROGRESS NOTES
CBC with Auto Differential; Future  -     Hemoglobin A1C; Future    RODRIGUES (dyspnea on exertion)-encouraged increased exercise and continued follow-up with cardiology.    Coronary artery disease involving native coronary artery of native heart without angina pectoris-Per cardiology.    Major depressive disorder, recurrent, moderate (HCC)-?  Controlled.  Will increase Wellbutrin to 300 mg today and see if symptoms improve.    Essential (primary) hypertension-blood pressure well-controlled on current meds.    Mixed hyperlipidemia    At high risk for falls    Other orders  -     buPROPion (WELLBUTRIN XL) 300 MG extended release tablet; Take 1 tablet by mouth every morning       No follow-up provider specified.       Advised him to call back or return to office if symptoms worsen/change/persist.  Discussed expected course/resolution/complications of diagnosis in detail with patient.    Medication risks/benefits/costs/interactions/alternatives discussed with patient.  He was given an after visit summary which includes diagnoses, current medications, & vitals.  He expressed understanding with the diagnosis and plan.

## 2024-07-19 RX ORDER — POTASSIUM CHLORIDE 750 MG/1
10 TABLET, EXTENDED RELEASE ORAL DAILY
Qty: 30 TABLET | Refills: 0 | Status: SHIPPED | OUTPATIENT
Start: 2024-07-19

## 2024-07-30 RX ORDER — GLIPIZIDE 5 MG/1
TABLET ORAL
Qty: 90 TABLET | Refills: 3 | Status: SHIPPED | OUTPATIENT
Start: 2024-07-30

## 2024-07-30 RX ORDER — AMLODIPINE BESYLATE 10 MG/1
10 TABLET ORAL DAILY
Qty: 90 TABLET | Refills: 3 | Status: SHIPPED | OUTPATIENT
Start: 2024-07-30

## 2024-07-30 NOTE — TELEPHONE ENCOUNTER
Chief Complaint   Patient presents with    Medication Refill     Last Appointment with Dr. Jason Daly:  7/18/2024   No future appointments.

## 2024-08-03 DIAGNOSIS — Z51.81 MEDICATION MONITORING ENCOUNTER: Primary | ICD-10-CM

## 2024-08-03 DIAGNOSIS — E87.6 HYPOKALEMIA: ICD-10-CM

## 2024-08-05 RX ORDER — POTASSIUM CHLORIDE 750 MG/1
10 TABLET, EXTENDED RELEASE ORAL DAILY
Qty: 30 TABLET | Refills: 0 | OUTPATIENT
Start: 2024-08-05

## 2024-09-16 ENCOUNTER — OFFICE VISIT (OUTPATIENT)
Age: 79
End: 2024-09-16
Payer: MEDICARE

## 2024-09-16 VITALS
TEMPERATURE: 98.1 F | SYSTOLIC BLOOD PRESSURE: 110 MMHG | WEIGHT: 221.6 LBS | OXYGEN SATURATION: 98 % | HEART RATE: 64 BPM | DIASTOLIC BLOOD PRESSURE: 73 MMHG | HEIGHT: 73 IN | RESPIRATION RATE: 15 BRPM | BODY MASS INDEX: 29.37 KG/M2

## 2024-09-16 DIAGNOSIS — I25.10 CORONARY ARTERY DISEASE INVOLVING NATIVE CORONARY ARTERY OF NATIVE HEART WITHOUT ANGINA PECTORIS: ICD-10-CM

## 2024-09-16 DIAGNOSIS — E11.8 TYPE 2 DIABETES MELLITUS WITH UNSPECIFIED COMPLICATIONS (HCC): ICD-10-CM

## 2024-09-16 DIAGNOSIS — E87.6 HYPOKALEMIA: Primary | ICD-10-CM

## 2024-09-16 DIAGNOSIS — I10 ESSENTIAL (PRIMARY) HYPERTENSION: ICD-10-CM

## 2024-09-16 DIAGNOSIS — E78.2 MIXED HYPERLIPIDEMIA: ICD-10-CM

## 2024-09-16 DIAGNOSIS — R06.09 DOE (DYSPNEA ON EXERTION): ICD-10-CM

## 2024-09-16 LAB
ANION GAP SERPL CALC-SCNC: 7 MMOL/L (ref 2–12)
BUN SERPL-MCNC: 29 MG/DL (ref 6–20)
BUN/CREAT SERPL: 21 (ref 12–20)
CALCIUM SERPL-MCNC: 9 MG/DL (ref 8.5–10.1)
CHLORIDE SERPL-SCNC: 103 MMOL/L (ref 97–108)
CO2 SERPL-SCNC: 28 MMOL/L (ref 21–32)
CREAT SERPL-MCNC: 1.37 MG/DL (ref 0.7–1.3)
GLUCOSE SERPL-MCNC: 191 MG/DL (ref 65–100)
POTASSIUM SERPL-SCNC: 3.5 MMOL/L (ref 3.5–5.1)
SODIUM SERPL-SCNC: 138 MMOL/L (ref 136–145)

## 2024-09-16 PROCEDURE — 99214 OFFICE O/P EST MOD 30 MIN: CPT | Performed by: INTERNAL MEDICINE

## 2024-09-16 PROCEDURE — 1123F ACP DISCUSS/DSCN MKR DOCD: CPT | Performed by: INTERNAL MEDICINE

## 2024-09-16 PROCEDURE — 3051F HG A1C>EQUAL 7.0%<8.0%: CPT | Performed by: INTERNAL MEDICINE

## 2024-09-16 PROCEDURE — G8417 CALC BMI ABV UP PARAM F/U: HCPCS | Performed by: INTERNAL MEDICINE

## 2024-09-16 PROCEDURE — 1036F TOBACCO NON-USER: CPT | Performed by: INTERNAL MEDICINE

## 2024-09-16 PROCEDURE — 3078F DIAST BP <80 MM HG: CPT | Performed by: INTERNAL MEDICINE

## 2024-09-16 PROCEDURE — 3074F SYST BP LT 130 MM HG: CPT | Performed by: INTERNAL MEDICINE

## 2024-09-16 PROCEDURE — G8427 DOCREV CUR MEDS BY ELIG CLIN: HCPCS | Performed by: INTERNAL MEDICINE

## 2024-09-16 RX ORDER — ROSUVASTATIN CALCIUM 10 MG/1
10 TABLET, COATED ORAL DAILY
Qty: 90 TABLET | Refills: 1 | Status: SHIPPED | OUTPATIENT
Start: 2024-09-16

## 2024-09-24 RX ORDER — TAMSULOSIN HYDROCHLORIDE 0.4 MG/1
0.4 CAPSULE ORAL DAILY
Qty: 90 CAPSULE | Refills: 1 | Status: SHIPPED | OUTPATIENT
Start: 2024-09-24

## 2024-09-24 RX ORDER — ESCITALOPRAM OXALATE 20 MG/1
20 TABLET ORAL DAILY
Qty: 90 TABLET | Refills: 1 | Status: SHIPPED | OUTPATIENT
Start: 2024-09-24

## 2024-09-24 RX ORDER — FINASTERIDE 5 MG/1
5 TABLET, FILM COATED ORAL DAILY
Qty: 90 TABLET | Refills: 1 | Status: SHIPPED | OUTPATIENT
Start: 2024-09-24

## 2024-12-23 RX ORDER — LISINOPRIL 40 MG/1
40 TABLET ORAL DAILY
Qty: 90 TABLET | Refills: 3 | Status: SHIPPED | OUTPATIENT
Start: 2024-12-23

## 2024-12-23 RX ORDER — CARVEDILOL 6.25 MG/1
6.25 TABLET ORAL 2 TIMES DAILY
Qty: 180 TABLET | Refills: 3 | Status: SHIPPED | OUTPATIENT
Start: 2024-12-23

## 2025-01-30 ENCOUNTER — APPOINTMENT (OUTPATIENT)
Facility: HOSPITAL | Age: 80
DRG: 682 | End: 2025-01-30
Payer: MEDICARE

## 2025-01-30 ENCOUNTER — HOSPITAL ENCOUNTER (INPATIENT)
Facility: HOSPITAL | Age: 80
LOS: 5 days | Discharge: HOME | DRG: 682 | End: 2025-02-04
Attending: EMERGENCY MEDICINE | Admitting: INTERNAL MEDICINE
Payer: MEDICARE

## 2025-01-30 DIAGNOSIS — A41.9 SEPSIS, DUE TO UNSPECIFIED ORGANISM, UNSPECIFIED WHETHER ACUTE ORGAN DYSFUNCTION PRESENT (HCC): Primary | ICD-10-CM

## 2025-01-30 DIAGNOSIS — R73.9 HYPERGLYCEMIA: ICD-10-CM

## 2025-01-30 DIAGNOSIS — B95.61 BACTEREMIA DUE TO METHICILLIN SUSCEPTIBLE STAPHYLOCOCCUS AUREUS (MSSA): ICD-10-CM

## 2025-01-30 DIAGNOSIS — N17.9 AKI (ACUTE KIDNEY INJURY) (HCC): ICD-10-CM

## 2025-01-30 DIAGNOSIS — R78.81 BACTEREMIA DUE TO METHICILLIN SUSCEPTIBLE STAPHYLOCOCCUS AUREUS (MSSA): ICD-10-CM

## 2025-01-30 DIAGNOSIS — U07.1 COVID-19 VIRUS INFECTION: ICD-10-CM

## 2025-01-30 DIAGNOSIS — E87.20 METABOLIC ACIDOSIS: ICD-10-CM

## 2025-01-30 PROBLEM — R65.10 SIRS (SYSTEMIC INFLAMMATORY RESPONSE SYNDROME) (HCC): Status: ACTIVE | Noted: 2025-01-30

## 2025-01-30 LAB
ALBUMIN SERPL-MCNC: 3.6 G/DL (ref 3.5–5)
ALBUMIN/GLOB SERPL: 1 (ref 1.1–2.2)
ALP SERPL-CCNC: 69 U/L (ref 45–117)
ALT SERPL-CCNC: 26 U/L (ref 12–78)
AMORPH CRY URNS QL MICRO: ABNORMAL
ANION GAP SERPL CALC-SCNC: 14 MMOL/L (ref 2–12)
APPEARANCE UR: CLEAR
AST SERPL-CCNC: 19 U/L (ref 15–37)
BACTERIA URNS QL MICRO: ABNORMAL /HPF
BASOPHILS # BLD: 0.04 K/UL (ref 0–0.1)
BASOPHILS NFR BLD: 0.2 % (ref 0–1)
BILIRUB SERPL-MCNC: 2 MG/DL (ref 0.2–1)
BILIRUB UR QL: NEGATIVE
BUN SERPL-MCNC: 28 MG/DL (ref 6–20)
BUN/CREAT SERPL: 17 (ref 12–20)
CALCIUM SERPL-MCNC: 9.1 MG/DL (ref 8.5–10.1)
CHLORIDE SERPL-SCNC: 95 MMOL/L (ref 97–108)
CO2 SERPL-SCNC: 22 MMOL/L (ref 21–32)
COLOR UR: ABNORMAL
COMMENT:: NORMAL
CREAT SERPL-MCNC: 1.68 MG/DL (ref 0.7–1.3)
DIFFERENTIAL METHOD BLD: ABNORMAL
EOSINOPHIL # BLD: 0 K/UL (ref 0–0.4)
EOSINOPHIL NFR BLD: 0 % (ref 0–7)
EPITH CASTS URNS QL MICRO: ABNORMAL /LPF
ERYTHROCYTE [DISTWIDTH] IN BLOOD BY AUTOMATED COUNT: 16.6 % (ref 11.5–14.5)
FLUAV RNA SPEC QL NAA+PROBE: NOT DETECTED
FLUBV RNA SPEC QL NAA+PROBE: NOT DETECTED
GLOBULIN SER CALC-MCNC: 3.5 G/DL (ref 2–4)
GLUCOSE BLD STRIP.AUTO-MCNC: 321 MG/DL (ref 65–117)
GLUCOSE SERPL-MCNC: 315 MG/DL (ref 65–100)
GLUCOSE UR STRIP.AUTO-MCNC: >1000 MG/DL
HCT VFR BLD AUTO: 34.2 % (ref 36.6–50.3)
HGB BLD-MCNC: 11 G/DL (ref 12.1–17)
HGB UR QL STRIP: NEGATIVE
IMM GRANULOCYTES # BLD AUTO: 0.21 K/UL (ref 0–0.04)
IMM GRANULOCYTES NFR BLD AUTO: 1.1 % (ref 0–0.5)
KETONES UR QL STRIP.AUTO: 15 MG/DL
LACTATE SERPL-SCNC: 1.6 MMOL/L (ref 0.4–2)
LACTATE SERPL-SCNC: 2 MMOL/L (ref 0.4–2)
LEUKOCYTE ESTERASE UR QL STRIP.AUTO: NEGATIVE
LYMPHOCYTES # BLD: 0.65 K/UL (ref 0.8–3.5)
LYMPHOCYTES NFR BLD: 3.4 % (ref 12–49)
MCH RBC QN AUTO: 19.3 PG (ref 26–34)
MCHC RBC AUTO-ENTMCNC: 32.2 G/DL (ref 30–36.5)
MCV RBC AUTO: 60 FL (ref 80–99)
MONOCYTES # BLD: 1.18 K/UL (ref 0–1)
MONOCYTES NFR BLD: 6.2 % (ref 5–13)
NEUTS SEG # BLD: 16.93 K/UL (ref 1.8–8)
NEUTS SEG NFR BLD: 89.1 % (ref 32–75)
NITRITE UR QL STRIP.AUTO: NEGATIVE
NRBC # BLD: 0 K/UL (ref 0–0.01)
NRBC BLD-RTO: 0 PER 100 WBC
PH UR STRIP: 5 (ref 5–8)
PLATELET # BLD AUTO: 182 K/UL (ref 150–400)
POTASSIUM SERPL-SCNC: 3.2 MMOL/L (ref 3.5–5.1)
PROCALCITONIN SERPL-MCNC: <0.05 NG/ML
PROT SERPL-MCNC: 7.1 G/DL (ref 6.4–8.2)
PROT UR STRIP-MCNC: ABNORMAL MG/DL
RBC # BLD AUTO: 5.7 M/UL (ref 4.1–5.7)
RBC #/AREA URNS HPF: ABNORMAL /HPF (ref 0–5)
RBC MORPH BLD: ABNORMAL
SARS-COV-2 RNA RESP QL NAA+PROBE: NOT DETECTED
SERVICE CMNT-IMP: ABNORMAL
SODIUM SERPL-SCNC: 131 MMOL/L (ref 136–145)
SOURCE: NORMAL
SP GR UR REFRACTOMETRY: 1.03 (ref 1–1.03)
SPECIMEN HOLD: NORMAL
SPECIMEN HOLD: NORMAL
URINE CULTURE IF INDICATED: ABNORMAL
UROBILINOGEN UR QL STRIP.AUTO: 0.2 EU/DL (ref 0.2–1)
WBC # BLD AUTO: 19 K/UL (ref 4.1–11.1)
WBC URNS QL MICRO: ABNORMAL /HPF (ref 0–4)

## 2025-01-30 PROCEDURE — 83036 HEMOGLOBIN GLYCOSYLATED A1C: CPT

## 2025-01-30 PROCEDURE — 82962 GLUCOSE BLOOD TEST: CPT

## 2025-01-30 PROCEDURE — 81001 URINALYSIS AUTO W/SCOPE: CPT

## 2025-01-30 PROCEDURE — 99285 EMERGENCY DEPT VISIT HI MDM: CPT

## 2025-01-30 PROCEDURE — 71046 X-RAY EXAM CHEST 2 VIEWS: CPT

## 2025-01-30 PROCEDURE — 87186 SC STD MICRODIL/AGAR DIL: CPT

## 2025-01-30 PROCEDURE — 2500000003 HC RX 250 WO HCPCS: Performed by: INTERNAL MEDICINE

## 2025-01-30 PROCEDURE — 82728 ASSAY OF FERRITIN: CPT

## 2025-01-30 PROCEDURE — 87636 SARSCOV2 & INF A&B AMP PRB: CPT

## 2025-01-30 PROCEDURE — 70450 CT HEAD/BRAIN W/O DYE: CPT

## 2025-01-30 PROCEDURE — 82746 ASSAY OF FOLIC ACID SERUM: CPT

## 2025-01-30 PROCEDURE — 87154 CUL TYP ID BLD PTHGN 6+ TRGT: CPT

## 2025-01-30 PROCEDURE — 87077 CULTURE AEROBIC IDENTIFY: CPT

## 2025-01-30 PROCEDURE — 6370000000 HC RX 637 (ALT 250 FOR IP): Performed by: INTERNAL MEDICINE

## 2025-01-30 PROCEDURE — 2580000003 HC RX 258: Performed by: INTERNAL MEDICINE

## 2025-01-30 PROCEDURE — 2580000003 HC RX 258: Performed by: EMERGENCY MEDICINE

## 2025-01-30 PROCEDURE — 6360000002 HC RX W HCPCS: Performed by: EMERGENCY MEDICINE

## 2025-01-30 PROCEDURE — 0202U NFCT DS 22 TRGT SARS-COV-2: CPT

## 2025-01-30 PROCEDURE — 84145 PROCALCITONIN (PCT): CPT

## 2025-01-30 PROCEDURE — 80053 COMPREHEN METABOLIC PANEL: CPT

## 2025-01-30 PROCEDURE — 36415 COLL VENOUS BLD VENIPUNCTURE: CPT

## 2025-01-30 PROCEDURE — 87040 BLOOD CULTURE FOR BACTERIA: CPT

## 2025-01-30 PROCEDURE — 76770 US EXAM ABDO BACK WALL COMP: CPT

## 2025-01-30 PROCEDURE — 83605 ASSAY OF LACTIC ACID: CPT

## 2025-01-30 PROCEDURE — 6370000000 HC RX 637 (ALT 250 FOR IP): Performed by: EMERGENCY MEDICINE

## 2025-01-30 PROCEDURE — 1100000000 HC RM PRIVATE

## 2025-01-30 PROCEDURE — 72125 CT NECK SPINE W/O DYE: CPT

## 2025-01-30 PROCEDURE — 83550 IRON BINDING TEST: CPT

## 2025-01-30 PROCEDURE — 96374 THER/PROPH/DIAG INJ IV PUSH: CPT

## 2025-01-30 PROCEDURE — 82607 VITAMIN B-12: CPT

## 2025-01-30 PROCEDURE — 85025 COMPLETE CBC W/AUTO DIFF WBC: CPT

## 2025-01-30 PROCEDURE — 2500000003 HC RX 250 WO HCPCS: Performed by: EMERGENCY MEDICINE

## 2025-01-30 PROCEDURE — 83540 ASSAY OF IRON: CPT

## 2025-01-30 RX ORDER — 0.9 % SODIUM CHLORIDE 0.9 %
1000 INTRAVENOUS SOLUTION INTRAVENOUS ONCE
Status: COMPLETED | OUTPATIENT
Start: 2025-01-30 | End: 2025-01-30

## 2025-01-30 RX ORDER — MAGNESIUM SULFATE IN WATER 40 MG/ML
2000 INJECTION, SOLUTION INTRAVENOUS PRN
Status: DISCONTINUED | OUTPATIENT
Start: 2025-01-30 | End: 2025-02-04 | Stop reason: HOSPADM

## 2025-01-30 RX ORDER — SODIUM CHLORIDE 9 MG/ML
INJECTION, SOLUTION INTRAVENOUS CONTINUOUS
Status: DISCONTINUED | OUTPATIENT
Start: 2025-01-30 | End: 2025-02-04 | Stop reason: HOSPADM

## 2025-01-30 RX ORDER — INSULIN LISPRO 100 [IU]/ML
0-8 INJECTION, SOLUTION INTRAVENOUS; SUBCUTANEOUS
Status: DISCONTINUED | OUTPATIENT
Start: 2025-01-30 | End: 2025-02-02

## 2025-01-30 RX ORDER — ONDANSETRON 4 MG/1
4 TABLET, ORALLY DISINTEGRATING ORAL EVERY 8 HOURS PRN
Status: DISCONTINUED | OUTPATIENT
Start: 2025-01-30 | End: 2025-02-04 | Stop reason: HOSPADM

## 2025-01-30 RX ORDER — ASPIRIN 81 MG/1
81 TABLET ORAL DAILY
Status: DISCONTINUED | OUTPATIENT
Start: 2025-01-30 | End: 2025-02-04 | Stop reason: HOSPADM

## 2025-01-30 RX ORDER — ESCITALOPRAM OXALATE 10 MG/1
20 TABLET ORAL DAILY
Status: DISCONTINUED | OUTPATIENT
Start: 2025-01-30 | End: 2025-02-04 | Stop reason: HOSPADM

## 2025-01-30 RX ORDER — ACETAMINOPHEN 325 MG/1
650 TABLET ORAL EVERY 6 HOURS PRN
Status: DISCONTINUED | OUTPATIENT
Start: 2025-01-30 | End: 2025-02-04 | Stop reason: HOSPADM

## 2025-01-30 RX ORDER — POTASSIUM CHLORIDE 750 MG/1
20 TABLET, EXTENDED RELEASE ORAL ONCE
Status: COMPLETED | OUTPATIENT
Start: 2025-01-30 | End: 2025-01-30

## 2025-01-30 RX ORDER — POLYETHYLENE GLYCOL 3350 17 G/17G
17 POWDER, FOR SOLUTION ORAL DAILY PRN
Status: DISCONTINUED | OUTPATIENT
Start: 2025-01-30 | End: 2025-02-04 | Stop reason: HOSPADM

## 2025-01-30 RX ORDER — SODIUM CHLORIDE 0.9 % (FLUSH) 0.9 %
5-40 SYRINGE (ML) INJECTION PRN
Status: DISCONTINUED | OUTPATIENT
Start: 2025-01-30 | End: 2025-02-04 | Stop reason: HOSPADM

## 2025-01-30 RX ORDER — ENOXAPARIN SODIUM 100 MG/ML
40 INJECTION SUBCUTANEOUS DAILY
Status: DISCONTINUED | OUTPATIENT
Start: 2025-01-30 | End: 2025-01-30

## 2025-01-30 RX ORDER — SODIUM CHLORIDE 0.9 % (FLUSH) 0.9 %
5-40 SYRINGE (ML) INJECTION EVERY 12 HOURS SCHEDULED
Status: DISCONTINUED | OUTPATIENT
Start: 2025-01-30 | End: 2025-02-04 | Stop reason: HOSPADM

## 2025-01-30 RX ORDER — ONDANSETRON 2 MG/ML
4 INJECTION INTRAMUSCULAR; INTRAVENOUS EVERY 6 HOURS PRN
Status: DISCONTINUED | OUTPATIENT
Start: 2025-01-30 | End: 2025-02-04 | Stop reason: HOSPADM

## 2025-01-30 RX ORDER — HEPARIN SODIUM 5000 [USP'U]/ML
5000 INJECTION, SOLUTION INTRAVENOUS; SUBCUTANEOUS EVERY 8 HOURS SCHEDULED
Status: DISCONTINUED | OUTPATIENT
Start: 2025-01-30 | End: 2025-02-04 | Stop reason: HOSPADM

## 2025-01-30 RX ORDER — ONDANSETRON 2 MG/ML
4 INJECTION INTRAMUSCULAR; INTRAVENOUS
Status: COMPLETED | OUTPATIENT
Start: 2025-01-30 | End: 2025-01-30

## 2025-01-30 RX ORDER — ACETAMINOPHEN 500 MG
1000 TABLET ORAL
Status: COMPLETED | OUTPATIENT
Start: 2025-01-30 | End: 2025-01-30

## 2025-01-30 RX ORDER — TAMSULOSIN HYDROCHLORIDE 0.4 MG/1
0.4 CAPSULE ORAL DAILY
Status: DISCONTINUED | OUTPATIENT
Start: 2025-01-30 | End: 2025-02-04 | Stop reason: HOSPADM

## 2025-01-30 RX ORDER — FINASTERIDE 5 MG/1
5 TABLET, FILM COATED ORAL DAILY
Status: DISCONTINUED | OUTPATIENT
Start: 2025-01-30 | End: 2025-02-04 | Stop reason: HOSPADM

## 2025-01-30 RX ORDER — DEXTROSE MONOHYDRATE 100 MG/ML
INJECTION, SOLUTION INTRAVENOUS CONTINUOUS PRN
Status: DISCONTINUED | OUTPATIENT
Start: 2025-01-30 | End: 2025-02-04 | Stop reason: HOSPADM

## 2025-01-30 RX ORDER — ACETAMINOPHEN 650 MG/1
650 SUPPOSITORY RECTAL EVERY 6 HOURS PRN
Status: DISCONTINUED | OUTPATIENT
Start: 2025-01-30 | End: 2025-02-04 | Stop reason: HOSPADM

## 2025-01-30 RX ORDER — BUPROPION HYDROCHLORIDE 150 MG/1
300 TABLET ORAL EVERY MORNING
Status: DISCONTINUED | OUTPATIENT
Start: 2025-01-31 | End: 2025-02-04 | Stop reason: HOSPADM

## 2025-01-30 RX ORDER — ROSUVASTATIN CALCIUM 10 MG/1
10 TABLET, COATED ORAL DAILY
Status: DISCONTINUED | OUTPATIENT
Start: 2025-01-30 | End: 2025-02-04 | Stop reason: HOSPADM

## 2025-01-30 RX ORDER — CARVEDILOL 6.25 MG/1
6.25 TABLET ORAL 2 TIMES DAILY
Status: DISCONTINUED | OUTPATIENT
Start: 2025-01-30 | End: 2025-02-04 | Stop reason: HOSPADM

## 2025-01-30 RX ORDER — INSULIN LISPRO 100 [IU]/ML
0-4 INJECTION, SOLUTION INTRAVENOUS; SUBCUTANEOUS
Status: DISCONTINUED | OUTPATIENT
Start: 2025-01-30 | End: 2025-01-30

## 2025-01-30 RX ORDER — SODIUM CHLORIDE 9 MG/ML
INJECTION, SOLUTION INTRAVENOUS PRN
Status: DISCONTINUED | OUTPATIENT
Start: 2025-01-30 | End: 2025-02-04 | Stop reason: SDUPTHER

## 2025-01-30 RX ORDER — POTASSIUM CHLORIDE 7.45 MG/ML
10 INJECTION INTRAVENOUS PRN
Status: DISCONTINUED | OUTPATIENT
Start: 2025-01-30 | End: 2025-02-04 | Stop reason: HOSPADM

## 2025-01-30 RX ORDER — POTASSIUM CHLORIDE 750 MG/1
40 TABLET, EXTENDED RELEASE ORAL PRN
Status: DISCONTINUED | OUTPATIENT
Start: 2025-01-30 | End: 2025-02-04 | Stop reason: HOSPADM

## 2025-01-30 RX ADMIN — TAMSULOSIN HYDROCHLORIDE 0.4 MG: 0.4 CAPSULE ORAL at 21:10

## 2025-01-30 RX ADMIN — ACETAMINOPHEN 1000 MG: 500 TABLET ORAL at 16:24

## 2025-01-30 RX ADMIN — ONDANSETRON 4 MG: 2 INJECTION, SOLUTION INTRAMUSCULAR; INTRAVENOUS at 16:24

## 2025-01-30 RX ADMIN — SODIUM CHLORIDE 1000 ML: 9 INJECTION, SOLUTION INTRAVENOUS at 16:24

## 2025-01-30 RX ADMIN — ESCITALOPRAM OXALATE 20 MG: 10 TABLET ORAL at 21:10

## 2025-01-30 RX ADMIN — INSULIN LISPRO 6 UNITS: 100 INJECTION, SOLUTION INTRAVENOUS; SUBCUTANEOUS at 21:10

## 2025-01-30 RX ADMIN — FINASTERIDE 5 MG: 5 TABLET, FILM COATED ORAL at 21:09

## 2025-01-30 RX ADMIN — DOXYCYCLINE 100 MG: 100 INJECTION, POWDER, LYOPHILIZED, FOR SOLUTION INTRAVENOUS at 19:19

## 2025-01-30 RX ADMIN — SODIUM CHLORIDE: 9 INJECTION, SOLUTION INTRAVENOUS at 21:05

## 2025-01-30 RX ADMIN — WATER 2000 MG: 1 INJECTION INTRAMUSCULAR; INTRAVENOUS; SUBCUTANEOUS at 18:44

## 2025-01-30 RX ADMIN — POTASSIUM CHLORIDE 20 MEQ: 750 TABLET, EXTENDED RELEASE ORAL at 19:19

## 2025-01-30 RX ADMIN — SODIUM CHLORIDE, PRESERVATIVE FREE 10 ML: 5 INJECTION INTRAVENOUS at 21:10

## 2025-01-30 NOTE — ED TRIAGE NOTES
Patient arrives via wheelchair to triage for complaints of chills, fever, body aches since Monday and started with nausea/vomiting today.     States he had a positive home Covid test today.     States he had a dose of Ibuprofen \"early this morning\".     PMH HTN, Diabetes

## 2025-01-30 NOTE — H&P
[facility]  [address] [place] [address] [phone]    Admission History and Physical      NAME:  Josh Almodovar   :   1945   MRN:  [id number]     PCP:  Jason Daly MD     Date/Time of service:  2025  5:28 PM        Subjective:     CHIEF COMPLAINT: Fevers, fatigue    HISTORY OF PRESENT ILLNESS:     Mr. Almodovar is a 79 y.o. male with a past medical history of coronary disease status post CABG, hypertension, diabetes, CVA depression who is admitted with SIRS.  Mr. Almodovar states that since Monday he has been experiencing fevers chills, fatigue.  He also endorses nausea vomiting diarrhea.  He states he is not feeling any dyspnea or cough.  Of note, he states this morning he fell getting out of bed; states he just felt weak.  He does not think he hit his head but is not sure.  He states that tested positive for COVID at home.  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    No Known Allergies    Prior to Admission medications    Medication Sig Start Date End Date Taking? Authorizing Provider   carvedilol (COREG) 6.25 MG tablet TAKE 1 TABLET TWICE A DAY 24   Jason Daly MD   lisinopril (PRINIVIL;ZESTRIL) 40 MG tablet TAKE 1 TABLET DAILY 24   Jason Daly MD   finasteride (PROSCAR) 5 MG tablet TAKE 1 TABLET DAILY 24   Jason Daly MD   tamsulosin (FLOMAX) 0.4 MG capsule TAKE 1 CAPSULE DAILY 24   Jason Daly MD   escitalopram (LEXAPRO) 20 MG tablet TAKE 1 TABLET DAILY 24   Jason Daly MD   rosuvastatin (CRESTOR) 10 MG tablet TAKE 1 TABLET DAILY 24   Jason Daly MD   amLODIPine (NORVASC) 10 MG tablet TAKE 1 TABLET DAILY  Patient not taking: Reported on 2024   Jason Daly MD   glipiZIDE (GLUCOTROL) 5 MG tablet TAKE 1 TABLET DAILY 24   Jason Daly MD   potassium chloride (KLOR-CON M) 10 MEQ extended release

## 2025-01-30 NOTE — ED PROVIDER NOTES
[facility] EMERGENCY DEPARTMENT  EMERGENCY DEPARTMENT ENCOUNTER      Pt Name: Josh Almodovar  MRN: [id number]  Birthdate 1945  Date of evaluation: 1/30/2025  Provider: Cesar Orlando MD    CHIEF COMPLAINT       Chief Complaint   Patient presents with    Illness         HISTORY OF PRESENT ILLNESS   (Location/Symptom, Timing/Onset, Context/Setting, Quality, Duration, Modifying Factors, Severity)  Note limiting factors.   Feeling sick for 3 days.  Vomiting.  Cold sweats.  No cough or congestion.  Denies chest pain.  + body aches.  Temp was 101 this morning.  Positive COVID test this AM at home.    The history is provided by the patient and medical records.         Review of External Medical Records:     Nursing Notes were reviewed.    REVIEW OF SYSTEMS    (2-9 systems for level 4, 10 or more for level 5)     Review of Systems   Constitutional:  Positive for chills and fatigue.   HENT:  Negative for sore throat.    Eyes:  Negative for visual disturbance.   Respiratory:  Negative for shortness of breath.    Cardiovascular:  Negative for palpitations.   Gastrointestinal:  Negative for constipation.   Genitourinary:  Negative for difficulty urinating.   Musculoskeletal:  Positive for myalgias.   Skin:  Negative for rash.       Except as noted above the remainder of the review of systems was reviewed and negative.       PAST MEDICAL HISTORY     Past Medical History:   Diagnosis Date    CAD (coronary artery disease) 01/23/2017    stent    DJD (degenerative joint disease) of knee 11/2013    left TKR    ED (erectile dysfunction)     Essential hypertension     Hyperlipidemia     T2DM (type 2 diabetes mellitus) (HCC)     Thalassemia minor          SURGICAL HISTORY       Past Surgical History:   Procedure Laterality Date    CABG, ARTERY-VEIN, SINGLE  01/19/2017    DAVINCI JEREMÍAS TAKEDOWN, LEFT ANTERIOR THORACOTOMY, OFF PUMP CORONARY ARTERY BYPASS GRAFTING X1,    CARDIAC CATHETERIZATION  2004    mild

## 2025-01-31 ENCOUNTER — APPOINTMENT (OUTPATIENT)
Facility: HOSPITAL | Age: 80
DRG: 682 | End: 2025-01-31
Payer: MEDICARE

## 2025-01-31 PROBLEM — B95.61 BACTEREMIA DUE TO METHICILLIN SUSCEPTIBLE STAPHYLOCOCCUS AUREUS (MSSA): Status: ACTIVE | Noted: 2025-01-31

## 2025-01-31 PROBLEM — A41.9 SEPSIS (HCC): Status: ACTIVE | Noted: 2025-01-31

## 2025-01-31 PROBLEM — R78.81 BACTEREMIA DUE TO METHICILLIN SUSCEPTIBLE STAPHYLOCOCCUS AUREUS (MSSA): Status: ACTIVE | Noted: 2025-01-31

## 2025-01-31 LAB
ACB COMPLEX DNA BLD POS QL NAA+NON-PROBE: NOT DETECTED
ACCESSION NUMBER, LLC1M: ABNORMAL
ANION GAP SERPL CALC-SCNC: 11 MMOL/L (ref 2–12)
B FRAGILIS DNA BLD POS QL NAA+NON-PROBE: NOT DETECTED
B PERT DNA SPEC QL NAA+PROBE: NOT DETECTED
BASOPHILS # BLD: 0.01 K/UL (ref 0–0.1)
BASOPHILS NFR BLD: 0.1 % (ref 0–1)
BIOFIRE TEST COMMENT: ABNORMAL
BORDETELLA PARAPERTUSSIS BY PCR: NOT DETECTED
BUN SERPL-MCNC: 38 MG/DL (ref 6–20)
BUN/CREAT SERPL: 20 (ref 12–20)
C ALBICANS DNA BLD POS QL NAA+NON-PROBE: NOT DETECTED
C AURIS DNA BLD POS QL NAA+NON-PROBE: NOT DETECTED
C GATTII+NEOFOR DNA BLD POS QL NAA+N-PRB: NOT DETECTED
C GLABRATA DNA BLD POS QL NAA+NON-PROBE: NOT DETECTED
C KRUSEI DNA BLD POS QL NAA+NON-PROBE: NOT DETECTED
C PARAP DNA BLD POS QL NAA+NON-PROBE: NOT DETECTED
C PNEUM DNA SPEC QL NAA+PROBE: NOT DETECTED
C TROPICLS DNA BLD POS QL NAA+NON-PROBE: NOT DETECTED
CALCIUM SERPL-MCNC: 8.4 MG/DL (ref 8.5–10.1)
CHLORIDE SERPL-SCNC: 97 MMOL/L (ref 97–108)
CK SERPL-CCNC: 354 U/L (ref 39–308)
CO2 SERPL-SCNC: 24 MMOL/L (ref 21–32)
CREAT SERPL-MCNC: 1.86 MG/DL (ref 0.7–1.3)
DIFFERENTIAL METHOD BLD: ABNORMAL
E CLOAC COMP DNA BLD POS NAA+NON-PROBE: NOT DETECTED
E COLI DNA BLD POS QL NAA+NON-PROBE: NOT DETECTED
E FAECALIS DNA BLD POS QL NAA+NON-PROBE: NOT DETECTED
E FAECIUM DNA BLD POS QL NAA+NON-PROBE: NOT DETECTED
ENTEROBACTERALES DNA BLD POS NAA+N-PRB: NOT DETECTED
EOSINOPHIL # BLD: 0 K/UL (ref 0–0.4)
EOSINOPHIL NFR BLD: 0 % (ref 0–7)
ERYTHROCYTE [DISTWIDTH] IN BLOOD BY AUTOMATED COUNT: 16.2 % (ref 11.5–14.5)
EST. AVERAGE GLUCOSE BLD GHB EST-MCNC: 194 MG/DL
FERRITIN SERPL-MCNC: 166 NG/ML (ref 26–388)
FLUAV SUBTYP SPEC NAA+PROBE: NOT DETECTED
FLUBV RNA SPEC QL NAA+PROBE: NOT DETECTED
FOLATE SERPL-MCNC: 45 NG/ML (ref 5–21)
GLUCOSE BLD STRIP.AUTO-MCNC: 248 MG/DL (ref 65–117)
GLUCOSE BLD STRIP.AUTO-MCNC: 249 MG/DL (ref 65–117)
GLUCOSE BLD STRIP.AUTO-MCNC: 274 MG/DL (ref 65–117)
GLUCOSE BLD STRIP.AUTO-MCNC: 329 MG/DL (ref 65–117)
GLUCOSE SERPL-MCNC: 262 MG/DL (ref 65–100)
GP B STREP DNA BLD POS QL NAA+NON-PROBE: NOT DETECTED
HADV DNA SPEC QL NAA+PROBE: NOT DETECTED
HAEM INFLU DNA BLD POS QL NAA+NON-PROBE: NOT DETECTED
HBA1C MFR BLD: 8.4 % (ref 4–5.6)
HCOV 229E RNA SPEC QL NAA+PROBE: NOT DETECTED
HCOV HKU1 RNA SPEC QL NAA+PROBE: NOT DETECTED
HCOV NL63 RNA SPEC QL NAA+PROBE: NOT DETECTED
HCOV OC43 RNA SPEC QL NAA+PROBE: NOT DETECTED
HCT VFR BLD AUTO: 28.9 % (ref 36.6–50.3)
HGB BLD-MCNC: 9.2 G/DL (ref 12.1–17)
HMPV RNA SPEC QL NAA+PROBE: NOT DETECTED
HPIV1 RNA SPEC QL NAA+PROBE: NOT DETECTED
HPIV2 RNA SPEC QL NAA+PROBE: NOT DETECTED
HPIV3 RNA SPEC QL NAA+PROBE: NOT DETECTED
HPIV4 RNA SPEC QL NAA+PROBE: NOT DETECTED
IMM GRANULOCYTES # BLD AUTO: 0.11 K/UL (ref 0–0.04)
IMM GRANULOCYTES NFR BLD AUTO: 0.9 % (ref 0–0.5)
IRON SATN MFR SERPL: 3 % (ref 20–50)
IRON SERPL-MCNC: 8 UG/DL (ref 35–150)
K OXYTOCA DNA BLD POS QL NAA+NON-PROBE: NOT DETECTED
KLEBSIELLA SP DNA BLD POS QL NAA+NON-PRB: NOT DETECTED
KLEBSIELLA SP DNA BLD POS QL NAA+NON-PRB: NOT DETECTED
L MONOCYTOG DNA BLD POS QL NAA+NON-PROBE: NOT DETECTED
LYMPHOCYTES # BLD: 0.59 K/UL (ref 0.8–3.5)
LYMPHOCYTES NFR BLD: 5 % (ref 12–49)
M PNEUMO DNA SPEC QL NAA+PROBE: NOT DETECTED
MCH RBC QN AUTO: 19 PG (ref 26–34)
MCHC RBC AUTO-ENTMCNC: 31.8 G/DL (ref 30–36.5)
MCV RBC AUTO: 59.6 FL (ref 80–99)
MECA+MECC+MREJ ISLT/SPM QL: NOT DETECTED
MONOCYTES # BLD: 0.68 K/UL (ref 0–1)
MONOCYTES NFR BLD: 5.8 % (ref 5–13)
N MEN DNA BLD POS QL NAA+NON-PROBE: NOT DETECTED
NEUTS SEG # BLD: 10.31 K/UL (ref 1.8–8)
NEUTS SEG NFR BLD: 88.2 % (ref 32–75)
NRBC # BLD: 0 K/UL (ref 0–0.01)
NRBC BLD-RTO: 0 PER 100 WBC
P AERUGINOSA DNA BLD POS NAA+NON-PROBE: NOT DETECTED
PLATELET # BLD AUTO: 138 K/UL (ref 150–400)
PLATELET COMMENT: ABNORMAL
PMV BLD AUTO: 9.8 FL (ref 8.9–12.9)
POTASSIUM SERPL-SCNC: 3.3 MMOL/L (ref 3.5–5.1)
PROTEUS SP DNA BLD POS QL NAA+NON-PROBE: NOT DETECTED
RBC # BLD AUTO: 4.85 M/UL (ref 4.1–5.7)
RBC MORPH BLD: ABNORMAL
RESISTANT GENE TARGETS: ABNORMAL
RSV RNA SPEC QL NAA+PROBE: NOT DETECTED
RV+EV RNA SPEC QL NAA+PROBE: NOT DETECTED
S AUREUS DNA BLD POS QL NAA+NON-PROBE: DETECTED
S AUREUS+CONS DNA BLD POS NAA+NON-PROBE: DETECTED
S EPIDERMIDIS DNA BLD POS QL NAA+NON-PRB: NOT DETECTED
S LUGDUNENSIS DNA BLD POS QL NAA+NON-PRB: NOT DETECTED
S MALTOPHILIA DNA BLD POS QL NAA+NON-PRB: NOT DETECTED
S MARCESCENS DNA BLD POS NAA+NON-PROBE: NOT DETECTED
S PNEUM DNA BLD POS QL NAA+NON-PROBE: NOT DETECTED
S PYO DNA BLD POS QL NAA+NON-PROBE: NOT DETECTED
SALMONELLA DNA BLD POS QL NAA+NON-PROBE: NOT DETECTED
SARS-COV-2 RNA RESP QL NAA+PROBE: NOT DETECTED
SERVICE CMNT-IMP: ABNORMAL
SODIUM SERPL-SCNC: 132 MMOL/L (ref 136–145)
STREPTOCOCCUS DNA BLD POS NAA+NON-PROBE: NOT DETECTED
TIBC SERPL-MCNC: 247 UG/DL (ref 250–450)
VIT B12 SERPL-MCNC: 517 PG/ML (ref 193–986)
WBC # BLD AUTO: 11.7 K/UL (ref 4.1–11.1)

## 2025-01-31 PROCEDURE — 2500000003 HC RX 250 WO HCPCS: Performed by: INTERNAL MEDICINE

## 2025-01-31 PROCEDURE — 2060000000 HC ICU INTERMEDIATE R&B

## 2025-01-31 PROCEDURE — 99223 1ST HOSP IP/OBS HIGH 75: CPT | Performed by: INTERNAL MEDICINE

## 2025-01-31 PROCEDURE — 6360000002 HC RX W HCPCS: Performed by: INTERNAL MEDICINE

## 2025-01-31 PROCEDURE — 36415 COLL VENOUS BLD VENIPUNCTURE: CPT

## 2025-01-31 PROCEDURE — 6370000000 HC RX 637 (ALT 250 FOR IP)

## 2025-01-31 PROCEDURE — 2580000003 HC RX 258: Performed by: INTERNAL MEDICINE

## 2025-01-31 PROCEDURE — 94761 N-INVAS EAR/PLS OXIMETRY MLT: CPT

## 2025-01-31 PROCEDURE — 72148 MRI LUMBAR SPINE W/O DYE: CPT

## 2025-01-31 PROCEDURE — 82550 ASSAY OF CK (CPK): CPT

## 2025-01-31 PROCEDURE — 6370000000 HC RX 637 (ALT 250 FOR IP): Performed by: INTERNAL MEDICINE

## 2025-01-31 PROCEDURE — 80048 BASIC METABOLIC PNL TOTAL CA: CPT

## 2025-01-31 PROCEDURE — 85025 COMPLETE CBC W/AUTO DIFF WBC: CPT

## 2025-01-31 PROCEDURE — 97530 THERAPEUTIC ACTIVITIES: CPT

## 2025-01-31 PROCEDURE — 2580000003 HC RX 258: Performed by: FAMILY MEDICINE

## 2025-01-31 PROCEDURE — 82962 GLUCOSE BLOOD TEST: CPT

## 2025-01-31 PROCEDURE — 97161 PT EVAL LOW COMPLEX 20 MIN: CPT

## 2025-01-31 RX ORDER — OXYCODONE HYDROCHLORIDE 5 MG/1
5 TABLET ORAL ONCE
Status: COMPLETED | OUTPATIENT
Start: 2025-01-31 | End: 2025-02-01

## 2025-01-31 RX ORDER — IBUPROFEN 200 MG
400 TABLET ORAL ONCE
Status: COMPLETED | OUTPATIENT
Start: 2025-01-31 | End: 2025-01-31

## 2025-01-31 RX ORDER — TRAMADOL HYDROCHLORIDE 50 MG/1
50 TABLET ORAL ONCE
Status: COMPLETED | OUTPATIENT
Start: 2025-01-31 | End: 2025-01-31

## 2025-01-31 RX ORDER — LIDOCAINE 4 G/G
1 PATCH TOPICAL DAILY
Status: DISCONTINUED | OUTPATIENT
Start: 2025-01-31 | End: 2025-02-04 | Stop reason: HOSPADM

## 2025-01-31 RX ORDER — LIDOCAINE 4 G/G
1 PATCH TOPICAL DAILY PRN
Status: DISCONTINUED | OUTPATIENT
Start: 2025-01-31 | End: 2025-02-04 | Stop reason: HOSPADM

## 2025-01-31 RX ADMIN — ACETAMINOPHEN 650 MG: 325 TABLET ORAL at 02:04

## 2025-01-31 RX ADMIN — TAMSULOSIN HYDROCHLORIDE 0.4 MG: 0.4 CAPSULE ORAL at 07:39

## 2025-01-31 RX ADMIN — ACETAMINOPHEN 650 MG: 325 TABLET ORAL at 18:47

## 2025-01-31 RX ADMIN — ACETAMINOPHEN 650 MG: 325 TABLET ORAL at 11:04

## 2025-01-31 RX ADMIN — INSULIN LISPRO 2 UNITS: 100 INJECTION, SOLUTION INTRAVENOUS; SUBCUTANEOUS at 20:46

## 2025-01-31 RX ADMIN — CEFEPIME 2000 MG: 2 INJECTION, POWDER, FOR SOLUTION INTRAVENOUS at 06:59

## 2025-01-31 RX ADMIN — INSULIN LISPRO 2 UNITS: 100 INJECTION, SOLUTION INTRAVENOUS; SUBCUTANEOUS at 12:41

## 2025-01-31 RX ADMIN — SODIUM CHLORIDE, PRESERVATIVE FREE 10 ML: 5 INJECTION INTRAVENOUS at 07:39

## 2025-01-31 RX ADMIN — SODIUM CHLORIDE, PRESERVATIVE FREE 10 ML: 5 INJECTION INTRAVENOUS at 20:47

## 2025-01-31 RX ADMIN — INSULIN LISPRO 6 UNITS: 100 INJECTION, SOLUTION INTRAVENOUS; SUBCUTANEOUS at 16:59

## 2025-01-31 RX ADMIN — SODIUM CHLORIDE: 9 INJECTION, SOLUTION INTRAVENOUS at 18:56

## 2025-01-31 RX ADMIN — BUPROPION HYDROCHLORIDE 300 MG: 150 TABLET, EXTENDED RELEASE ORAL at 07:39

## 2025-01-31 RX ADMIN — DOXYCYCLINE 100 MG: 100 INJECTION, POWDER, LYOPHILIZED, FOR SOLUTION INTRAVENOUS at 06:46

## 2025-01-31 RX ADMIN — FINASTERIDE 5 MG: 5 TABLET, FILM COATED ORAL at 07:39

## 2025-01-31 RX ADMIN — POTASSIUM CHLORIDE 40 MEQ: 750 TABLET, EXTENDED RELEASE ORAL at 06:52

## 2025-01-31 RX ADMIN — WATER 2000 MG: 1 INJECTION INTRAMUSCULAR; INTRAVENOUS; SUBCUTANEOUS at 18:47

## 2025-01-31 RX ADMIN — IBUPROFEN 400 MG: 200 TABLET, FILM COATED ORAL at 20:45

## 2025-01-31 RX ADMIN — SODIUM CHLORIDE 800 MG: 9 INJECTION INTRAMUSCULAR; INTRAVENOUS; SUBCUTANEOUS at 23:01

## 2025-01-31 RX ADMIN — TRAMADOL HYDROCHLORIDE 50 MG: 50 TABLET, FILM COATED ORAL at 20:45

## 2025-01-31 RX ADMIN — ESCITALOPRAM OXALATE 20 MG: 10 TABLET ORAL at 07:39

## 2025-01-31 RX ADMIN — INSULIN LISPRO 4 UNITS: 100 INJECTION, SOLUTION INTRAVENOUS; SUBCUTANEOUS at 07:39

## 2025-01-31 ASSESSMENT — PAIN SCALES - GENERAL
PAINLEVEL_OUTOF10: 5
PAINLEVEL_OUTOF10: 8
PAINLEVEL_OUTOF10: 5
PAINLEVEL_OUTOF10: 8

## 2025-01-31 ASSESSMENT — PAIN DESCRIPTION - LOCATION
LOCATION: BACK
LOCATION: HIP
LOCATION: HIP
LOCATION: BACK
LOCATION: ANKLE

## 2025-01-31 ASSESSMENT — PAIN DESCRIPTION - ORIENTATION
ORIENTATION: LOWER
ORIENTATION: LOWER
ORIENTATION: LEFT
ORIENTATION: RIGHT
ORIENTATION: RIGHT

## 2025-01-31 ASSESSMENT — PAIN DESCRIPTION - DESCRIPTORS
DESCRIPTORS: ACHING
DESCRIPTORS: ACHING

## 2025-01-31 NOTE — PLAN OF CARE
Problem: Physical Therapy - Adult  Goal: By Discharge: Performs mobility at highest level of function for planned discharge setting.  See evaluation for individualized goals.  Description: FUNCTIONAL STATUS PRIOR TO ADMISSION: Patient was independent and active without use of DME.    HOME SUPPORT PRIOR TO ADMISSION: The patient lived with spouse but did not require assistance.  Spouse is a retired OT.    Physical Therapy Goals  Initiated 1/31/2025  1.  Patient will move from supine to sit and sit to supine , scoot up and down, and roll side to side in bed with modified independence within 7 day(s).    2.  Patient will transfer from bed to chair and chair to bed with modified independence using the least restrictive device within 7 day(s).  3.  Patient will perform sit to stand with modified independence within 7 day(s).  4.  Patient will ambulate with independence for 300 feet with the least restrictive device within 7 day(s).   5.  Patient will ascend/descend 4 stairs with 1 handrail(s) with modified independence within 7 day(s).      Outcome: Progressing   PHYSICAL THERAPY EVALUATION    Patient: Josh Almodovar (79 y.o. male)  Date: 1/31/2025  Primary Diagnosis: Metabolic acidosis [E87.20]  Hyperglycemia [R73.9]  SIRS (systemic inflammatory response syndrome) ([facility]) [R65.10]  TONI (acute kidney injury) ([facility]) [N17.9]  Sepsis, due to unspecified organism, unspecified whether acute organ dysfunction present ([facility]) [A41.9]  COVID-19 virus infection [U07.1]  COVID-19 [U07.1]       Precautions:                        ASSESSMENT :   DEFICITS/IMPAIRMENTS:   The patient is limited by decreased functional mobility, activity tolerance, endurance, balance, increased pain levels, orthostatic hypotension and gait dysfunction s/p admission for sepsis and Covid-19. Patient received on RA, good sats, agreeable to PT evaluation. C/o dizziness upon transition to sitting, mildly hypotensive, improved with time.  Transitioned to

## 2025-01-31 NOTE — CARE COORDINATION
Care Management Initial Assessment  1/31/2025 1:45 PM  If patient is discharged prior to next notation, then this note serves as note for discharge by case management.    Reason for Admission:   Metabolic acidosis [E87.20]  Hyperglycemia [R73.9]  SIRS (systemic inflammatory response syndrome) (HCC) [R65.10]  TONI (acute kidney injury) (HCC) [N17.9]  Sepsis, due to unspecified organism, unspecified whether acute organ dysfunction present (HCC) [A41.9]  COVID-19 virus infection [U07.1]  COVID-19 [U07.1]         Patient Admission Status: Inpatient  Date Admitted to INP: 1/30  RUR: Readmission Risk Score: 16.6    Hospitalization in the last 30 days (Readmission):  No        Advance Care Planning:  Code Status: Full Code  Primary Healthcare Decision Maker: (P) Named in Scanned ACP Document  Primary Decision Maker: Kelly Hutton - Spouse - [phone]    Secondary Decision Maker: Maximino Almodovarew - [phone]   Advance Directive: has an advanced directive - a copy has been provided     __________________________________________________________________________  Assessment:      01/31/25 1341   Service Assessment   Patient Orientation Alert and Oriented   Cognition Alert   Primary Caregiver Self   Support Systems Spouse/Significant Other   Patient's Healthcare Decision Maker is: Named in Scanned ACP Document   PCP Verified by CM Yes   Last Visit to PCP Within last 3 months   Prior Functional Level Independent in ADLs/IADLs   Current Functional Level Independent in ADLs/IADLs   Can patient return to prior living arrangement Yes   Ability to make needs known: Good   Family able to assist with home care needs: Yes   Would you like for me to discuss the discharge plan with any other family members/significant others, and if so, who? Yes  (Spouse Kelly at bedside)   Financial Resources Medicare   Social/Functional History   Lives With Spouse   Type of Home House   Home Layout One level   Home Access Stairs to enter with rails

## 2025-01-31 NOTE — PROGRESS NOTES
Pharmacy Note - Dose adjustment for daptomycin per P&T approved protocol.    Dosing Weight Used:  Recorded wt 98.4 kg    BMI:   BMI Readings from Last 1 Encounters:   01/30/25 28.63 kg/m²     Height:   Ht Readings from Last 1 Encounters:   01/30/25 1.854 m (6' 1\")     ABW:  Wt Readings from Last 1 Encounters:   01/30/25 98.4 kg (217 lb)        IBW and AdjBW: Ideal body weight: 79.9 kg (176 lb 2.4 oz)  Adjusted ideal body weight: 87.3 kg (192 lb 7.8 oz)        Dosing:  Estimated Creatinine Clearance: 40 mL/min (A) (based on SCr of 1.86 mg/dL (H)).  Recent Labs     01/30/25  1534 01/31/25  0343   BUN 28* 38*   CREATININE 1.68* 1.86*           Max recommended dose is 1500mg daily. If dose is exceeded confirm dosing with infectious disease physician or fellow pharmacist. Increased incidence of side effects although benefit may outweigh risks in some cases. See dosing protocol for more information.    Statin Therapy    Statin therapy has been place on hold/discontinued while on daptomycin therapy.    CK Monitoring    Creatinine Kinase (CK) has been ordered by pharmacy    Ordered dose: 8 mg/kg x 98.4 kg (recorded wt), to be rounded to nearest 50 mg per [facility] dosing policy    Daptomycin 800 mg IVPB every 24  hours has been ordered.    Thank you,  ROSEMARY WOMACK, [facility] MS  1/31/2025, 5:16 PM

## 2025-01-31 NOTE — PROGRESS NOTES
Infectious Disease Consult    Impression/Plan   Staphylococcus aureus bacteremia.  Likely MSSA based on blood PCR ID panel.  Serial blood cultures to document sterilization of blood.  Check echocardiogram.  Will narrow antibiotics to daptomycin and cefazolin.  May stop daptomycin once MRSA has been ruled out.    COVID.  Patient asymptomatic.  Supportive care  TONI.  Creatinine continues to trend up.  Avoid nephrotoxic medications  Leukocytosis.  Due to above.  Thrombocytopenia.  Likely due to infection.  Follow trend  Low back pain.  In light of bacteremia will need further imaging.  MRI has been ordered by the hospitalist  Diabetes mellitus.  This is poorly controlled.  Hemoglobin A1c is 8.4  Depression.  Patient on Lexapro and Wellbutrin    Anti-infectives:   Cefepime  Doxycycline    Subjective:   Date of Consultation:  January 31, 2025  Date of Admission: 1/30/2025   Referring Physician:     Patient is a 79 y.o. male with a past medical history significant for hypertension, diabetes mellitus, and thalassemia minor who is being seen for bacteremia.    Patient states that he was in his usual state of health up until early this week when he developed fever, chills, fatigue, nausea, and vomiting.  He tested positive for COVID at home.  Due to ongoing symptoms he presented to [facility] for further evaluation.    Initial workup has revealed him to be COVID-negative.  Blood cultures returned growing MSSA.  Patient denies any recent infections but does complain of chronic left second toe wound that is followed by podiatry in the outpatient setting.    He is currently on cefepime and doxycycline.  The infectious diseases service has been asked to assist with antibiotic management    Patient Active Problem List   Diagnosis    Dyslipidemia    S/P CABG x 1    HTN (hypertension), benign    DM (diabetes mellitus), type 2 with complications (HCC)    CAD (coronary artery disease)    CVA (cerebral vascular

## 2025-01-31 NOTE — PROGRESS NOTES
[facility]                                                                                          Hospitalist Progress Note  Charles Good MD  Office Phone: [phone]        Date of Service:  2025  NAME:  Josh Almodovar  :  1945  MRN:  [id number]       Admission HPI:   Mr. Almodovar is a 79 y.o. male with a past medical history of coronary disease status post CABG, hypertension, diabetes, CVA depression who is admitted with SIRS.  Mr. Almodovar states that since Monday he has been experiencing fevers chills, fatigue.  He also endorses nausea vomiting diarrhea.  He states he is not feeling any dyspnea or cough.  Of note, he states this morning he fell getting out of bed; states he just felt weak.  He does not think he hit his head but is not sure.  He states that tested positive for COVID at home.  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        Interval history / Subjective:   : Patient reporting R low back pain today. States that he has experienced this intermittently in the past.  No recent issues.  However he did sustain a ground-level fall yesterday prior to coming in.  Had difficulty working with PT today due to the pain.  Denies any lower extremity numbness/weakness/tingling, saddle anesthesia or bowel/bladder incontinence.  Otherwise, no acute complaints today.     Assessment & Plan:     Staph Bacteremia: Positive in 2/2 bottles today. Repeat cultures pending. ID consult. Continue abx for now, WBC improving. Suspect primarily effect from doxy. Uncertain source, question contaminant. If repeat positive, may need CT c/a/p, echo.     SIRS POA: Meets criteria with fever, tachycardia and leukocytosis but no obvious source.  Lactic acid within normal limits.  Blood cultures 2/2 25 positive for staph. Chest x-ray with no signs of pneumonia; may consider CT however patient

## 2025-02-01 ENCOUNTER — APPOINTMENT (OUTPATIENT)
Facility: HOSPITAL | Age: 80
DRG: 682 | End: 2025-02-01
Payer: MEDICARE

## 2025-02-01 ENCOUNTER — APPOINTMENT (OUTPATIENT)
Facility: HOSPITAL | Age: 80
DRG: 682 | End: 2025-02-01
Attending: INTERNAL MEDICINE
Payer: MEDICARE

## 2025-02-01 LAB
ANION GAP SERPL CALC-SCNC: 8 MMOL/L (ref 2–12)
BACTERIA SPEC CULT: NORMAL
BACTERIA SPEC CULT: NORMAL
BASOPHILS # BLD: 0.01 K/UL (ref 0–0.1)
BASOPHILS NFR BLD: 0.1 % (ref 0–1)
BUN SERPL-MCNC: 40 MG/DL (ref 6–20)
BUN/CREAT SERPL: 27 (ref 12–20)
CALCIUM SERPL-MCNC: 8.8 MG/DL (ref 8.5–10.1)
CHLORIDE SERPL-SCNC: 100 MMOL/L (ref 97–108)
CO2 SERPL-SCNC: 22 MMOL/L (ref 21–32)
CREAT SERPL-MCNC: 1.48 MG/DL (ref 0.7–1.3)
DIFFERENTIAL METHOD BLD: ABNORMAL
ECHO AO ARCH DIAM: 1.9 CM
ECHO AO ASC DIAM: 3.6 CM
ECHO AO ASCENDING AORTA INDEX: 1.58 CM/M2
ECHO AO ROOT DIAM: 3.7 CM
ECHO AO ROOT INDEX: 1.62 CM/M2
ECHO AV AREA PEAK VELOCITY: 2.9 CM2
ECHO AV AREA PEAK VELOCITY: 3.4 CM2
ECHO AV AREA VTI: 3.2 CM2
ECHO AV AREA/BSA VTI: 1.4 CM2/M2
ECHO AV MEAN GRADIENT: 7 MMHG
ECHO AV MEAN VELOCITY: 1.2 M/S
ECHO AV PEAK GRADIENT: 13 MMHG
ECHO AV PEAK GRADIENT: 9 MMHG
ECHO AV PEAK VELOCITY: 1.5 M/S
ECHO AV PEAK VELOCITY: 1.8 M/S
ECHO AV VTI: 35.6 CM
ECHO BSA: 2.31 M2
ECHO LA DIAMETER INDEX: 1.93 CM/M2
ECHO LA DIAMETER: 4.4 CM
ECHO LA TO AORTIC ROOT RATIO: 1.19
ECHO LA VOL A-L A2C: 70 ML (ref 18–58)
ECHO LA VOL A-L A4C: 58 ML (ref 18–58)
ECHO LA VOL BP: 59 ML (ref 18–58)
ECHO LA VOL MOD A2C: 66 ML (ref 18–58)
ECHO LA VOL MOD A4C: 51 ML (ref 18–58)
ECHO LA VOL/BSA BIPLANE: 26 ML/M2 (ref 16–34)
ECHO LA VOLUME AREA LENGTH: 64 ML
ECHO LA VOLUME INDEX A-L A2C: 31 ML/M2 (ref 16–34)
ECHO LA VOLUME INDEX A-L A4C: 25 ML/M2 (ref 16–34)
ECHO LA VOLUME INDEX AREA LENGTH: 28 ML/M2 (ref 16–34)
ECHO LA VOLUME INDEX MOD A2C: 29 ML/M2 (ref 16–34)
ECHO LA VOLUME INDEX MOD A4C: 22 ML/M2 (ref 16–34)
ECHO LV E' LATERAL VELOCITY: 12.66 CM/S
ECHO LV E' SEPTAL VELOCITY: 10.47 CM/S
ECHO LV EDV A2C: 95 ML
ECHO LV EDV A4C: 161 ML
ECHO LV EDV BP: 125 ML (ref 67–155)
ECHO LV EDV INDEX A4C: 71 ML/M2
ECHO LV EDV INDEX BP: 55 ML/M2
ECHO LV EDV NDEX A2C: 42 ML/M2
ECHO LV EJECTION FRACTION A2C: 70 %
ECHO LV EJECTION FRACTION A4C: 76 %
ECHO LV EJECTION FRACTION BIPLANE: 72 % (ref 55–100)
ECHO LV ESV A2C: 29 ML
ECHO LV ESV A4C: 38 ML
ECHO LV ESV BP: 35 ML (ref 22–58)
ECHO LV ESV INDEX A2C: 13 ML/M2
ECHO LV ESV INDEX A4C: 17 ML/M2
ECHO LV ESV INDEX BP: 15 ML/M2
ECHO LV FRACTIONAL SHORTENING: 44 % (ref 28–44)
ECHO LV INTERNAL DIMENSION DIASTOLE INDEX: 2.41 CM/M2
ECHO LV INTERNAL DIMENSION DIASTOLIC: 5.5 CM (ref 4.2–5.9)
ECHO LV INTERNAL DIMENSION SYSTOLIC INDEX: 1.36 CM/M2
ECHO LV INTERNAL DIMENSION SYSTOLIC: 3.1 CM
ECHO LV IVSD: 0.9 CM (ref 0.6–1)
ECHO LV MASS 2D: 185.8 G (ref 88–224)
ECHO LV MASS INDEX 2D: 81.5 G/M2 (ref 49–115)
ECHO LV POSTERIOR WALL DIASTOLIC: 0.9 CM (ref 0.6–1)
ECHO LV RELATIVE WALL THICKNESS RATIO: 0.33
ECHO LVOT AREA: 4.2 CM2
ECHO LVOT AV VTI INDEX: 0.8
ECHO LVOT DIAM: 2.3 CM
ECHO LVOT MEAN GRADIENT: 4 MMHG
ECHO LVOT PEAK GRADIENT: 7 MMHG
ECHO LVOT PEAK VELOCITY: 1.3 M/S
ECHO LVOT STROKE VOLUME INDEX: 52.1 ML/M2
ECHO LVOT SV: 118.8 ML
ECHO LVOT VTI: 28.6 CM
ECHO MV A VELOCITY: 1.04 M/S
ECHO MV E DECELERATION TIME (DT): 266.1 MS
ECHO MV E VELOCITY: 0.71 M/S
ECHO MV E/A RATIO: 0.68
ECHO MV E/E' LATERAL: 5.61
ECHO MV E/E' RATIO (AVERAGED): 6.19
ECHO MV E/E' SEPTAL: 6.78
ECHO MV REGURGITANT PEAK GRADIENT: 100 MMHG
ECHO MV REGURGITANT PEAK VELOCITY: 5 M/S
ECHO PULMONARY ARTERY END DIASTOLIC PRESSURE: 2 MMHG
ECHO PV MAX VELOCITY: 1.2 M/S
ECHO PV PEAK GRADIENT: 6 MMHG
ECHO PV REGURGITANT MAX VELOCITY: 0.7 M/S
ECHO RV FREE WALL PEAK S': 10.7 CM/S
ECHO RV INTERNAL DIMENSION: 4.8 CM
ECHO RV TAPSE: 1.6 CM (ref 1.7–?)
ECHO TV REGURGITANT MAX VELOCITY: 2.56 M/S
ECHO TV REGURGITANT PEAK GRADIENT: 26 MMHG
EOSINOPHIL # BLD: 0.03 K/UL (ref 0–0.4)
EOSINOPHIL NFR BLD: 0.3 % (ref 0–7)
ERYTHROCYTE [DISTWIDTH] IN BLOOD BY AUTOMATED COUNT: 16.2 % (ref 11.5–14.5)
GLUCOSE BLD STRIP.AUTO-MCNC: 238 MG/DL (ref 65–117)
GLUCOSE BLD STRIP.AUTO-MCNC: 259 MG/DL (ref 65–117)
GLUCOSE BLD STRIP.AUTO-MCNC: 263 MG/DL (ref 65–117)
GLUCOSE BLD STRIP.AUTO-MCNC: 318 MG/DL (ref 65–117)
GLUCOSE SERPL-MCNC: 230 MG/DL (ref 65–100)
HCT VFR BLD AUTO: 31 % (ref 36.6–50.3)
HGB BLD-MCNC: 9.9 G/DL (ref 12.1–17)
IMM GRANULOCYTES # BLD AUTO: 0.04 K/UL (ref 0–0.04)
IMM GRANULOCYTES NFR BLD AUTO: 0.5 % (ref 0–0.5)
LYMPHOCYTES # BLD: 0.97 K/UL (ref 0.8–3.5)
LYMPHOCYTES NFR BLD: 11.2 % (ref 12–49)
MCH RBC QN AUTO: 19 PG (ref 26–34)
MCHC RBC AUTO-ENTMCNC: 31.9 G/DL (ref 30–36.5)
MCV RBC AUTO: 59.6 FL (ref 80–99)
MONOCYTES # BLD: 0.83 K/UL (ref 0–1)
MONOCYTES NFR BLD: 9.5 % (ref 5–13)
NEUTS SEG # BLD: 6.82 K/UL (ref 1.8–8)
NEUTS SEG NFR BLD: 78.4 % (ref 32–75)
NRBC # BLD: 0 K/UL (ref 0–0.01)
NRBC BLD-RTO: 0 PER 100 WBC
PLATELET # BLD AUTO: 153 K/UL (ref 150–400)
POTASSIUM SERPL-SCNC: 2.9 MMOL/L (ref 3.5–5.1)
RBC # BLD AUTO: 5.2 M/UL (ref 4.1–5.7)
RBC MORPH BLD: ABNORMAL
SERVICE CMNT-IMP: ABNORMAL
SERVICE CMNT-IMP: NORMAL
SODIUM SERPL-SCNC: 130 MMOL/L (ref 136–145)
WBC # BLD AUTO: 8.7 K/UL (ref 4.1–11.1)

## 2025-02-01 PROCEDURE — 85025 COMPLETE CBC W/AUTO DIFF WBC: CPT

## 2025-02-01 PROCEDURE — 2500000003 HC RX 250 WO HCPCS: Performed by: INTERNAL MEDICINE

## 2025-02-01 PROCEDURE — 80048 BASIC METABOLIC PNL TOTAL CA: CPT

## 2025-02-01 PROCEDURE — 6370000000 HC RX 637 (ALT 250 FOR IP): Performed by: INTERNAL MEDICINE

## 2025-02-01 PROCEDURE — 2060000000 HC ICU INTERMEDIATE R&B

## 2025-02-01 PROCEDURE — 36415 COLL VENOUS BLD VENIPUNCTURE: CPT

## 2025-02-01 PROCEDURE — 93306 TTE W/DOPPLER COMPLETE: CPT

## 2025-02-01 PROCEDURE — 87040 BLOOD CULTURE FOR BACTERIA: CPT

## 2025-02-01 PROCEDURE — 99232 SBSQ HOSP IP/OBS MODERATE 35: CPT | Performed by: INTERNAL MEDICINE

## 2025-02-01 PROCEDURE — 73660 X-RAY EXAM OF TOE(S): CPT

## 2025-02-01 PROCEDURE — 6360000002 HC RX W HCPCS: Performed by: INTERNAL MEDICINE

## 2025-02-01 PROCEDURE — 2580000003 HC RX 258: Performed by: FAMILY MEDICINE

## 2025-02-01 PROCEDURE — 6370000000 HC RX 637 (ALT 250 FOR IP)

## 2025-02-01 PROCEDURE — 82962 GLUCOSE BLOOD TEST: CPT

## 2025-02-01 PROCEDURE — 93306 TTE W/DOPPLER COMPLETE: CPT | Performed by: INTERNAL MEDICINE

## 2025-02-01 PROCEDURE — 94761 N-INVAS EAR/PLS OXIMETRY MLT: CPT

## 2025-02-01 PROCEDURE — 2580000003 HC RX 258: Performed by: INTERNAL MEDICINE

## 2025-02-01 RX ORDER — DEXTROSE MONOHYDRATE 100 MG/ML
INJECTION, SOLUTION INTRAVENOUS CONTINUOUS PRN
Status: DISCONTINUED | OUTPATIENT
Start: 2025-02-01 | End: 2025-02-04 | Stop reason: HOSPADM

## 2025-02-01 RX ORDER — OXYCODONE HYDROCHLORIDE 5 MG/1
5 TABLET ORAL EVERY 6 HOURS PRN
Status: DISCONTINUED | OUTPATIENT
Start: 2025-02-01 | End: 2025-02-04 | Stop reason: HOSPADM

## 2025-02-01 RX ORDER — INSULIN LISPRO 100 [IU]/ML
0-8 INJECTION, SOLUTION INTRAVENOUS; SUBCUTANEOUS
Status: DISCONTINUED | OUTPATIENT
Start: 2025-02-01 | End: 2025-02-04 | Stop reason: HOSPADM

## 2025-02-01 RX ORDER — HYDROMORPHONE HYDROCHLORIDE 1 MG/ML
0.5 INJECTION, SOLUTION INTRAMUSCULAR; INTRAVENOUS; SUBCUTANEOUS ONCE
Status: COMPLETED | OUTPATIENT
Start: 2025-02-01 | End: 2025-02-01

## 2025-02-01 RX ADMIN — ACETAMINOPHEN 650 MG: 325 TABLET ORAL at 12:42

## 2025-02-01 RX ADMIN — WATER 2000 MG: 1 INJECTION INTRAMUSCULAR; INTRAVENOUS; SUBCUTANEOUS at 10:14

## 2025-02-01 RX ADMIN — INSULIN LISPRO 4 UNITS: 100 INJECTION, SOLUTION INTRAVENOUS; SUBCUTANEOUS at 18:36

## 2025-02-01 RX ADMIN — SODIUM CHLORIDE 800 MG: 9 INJECTION INTRAMUSCULAR; INTRAVENOUS; SUBCUTANEOUS at 20:39

## 2025-02-01 RX ADMIN — OXYCODONE HYDROCHLORIDE 5 MG: 5 TABLET ORAL at 16:07

## 2025-02-01 RX ADMIN — INSULIN LISPRO 6 UNITS: 100 INJECTION, SOLUTION INTRAVENOUS; SUBCUTANEOUS at 20:37

## 2025-02-01 RX ADMIN — BUPROPION HYDROCHLORIDE 300 MG: 150 TABLET, EXTENDED RELEASE ORAL at 10:14

## 2025-02-01 RX ADMIN — TAMSULOSIN HYDROCHLORIDE 0.4 MG: 0.4 CAPSULE ORAL at 10:15

## 2025-02-01 RX ADMIN — INSULIN LISPRO 4 UNITS: 100 INJECTION, SOLUTION INTRAVENOUS; SUBCUTANEOUS at 12:44

## 2025-02-01 RX ADMIN — OXYCODONE 5 MG: 5 TABLET ORAL at 20:38

## 2025-02-01 RX ADMIN — INSULIN LISPRO 2 UNITS: 100 INJECTION, SOLUTION INTRAVENOUS; SUBCUTANEOUS at 10:15

## 2025-02-01 RX ADMIN — FINASTERIDE 5 MG: 5 TABLET, FILM COATED ORAL at 10:17

## 2025-02-01 RX ADMIN — HYDROMORPHONE HYDROCHLORIDE 0.5 MG: 1 INJECTION, SOLUTION INTRAMUSCULAR; INTRAVENOUS; SUBCUTANEOUS at 18:36

## 2025-02-01 RX ADMIN — WATER 2000 MG: 1 INJECTION INTRAMUSCULAR; INTRAVENOUS; SUBCUTANEOUS at 16:08

## 2025-02-01 RX ADMIN — SODIUM CHLORIDE: 9 INJECTION, SOLUTION INTRAVENOUS at 19:16

## 2025-02-01 RX ADMIN — SODIUM CHLORIDE: 9 INJECTION, SOLUTION INTRAVENOUS at 05:48

## 2025-02-01 RX ADMIN — WATER 2000 MG: 1 INJECTION INTRAMUSCULAR; INTRAVENOUS; SUBCUTANEOUS at 03:00

## 2025-02-01 RX ADMIN — ESCITALOPRAM OXALATE 20 MG: 10 TABLET ORAL at 10:14

## 2025-02-01 RX ADMIN — SODIUM CHLORIDE, PRESERVATIVE FREE 10 ML: 5 INJECTION INTRAVENOUS at 20:39

## 2025-02-01 ASSESSMENT — PAIN SCALES - GENERAL
PAINLEVEL_OUTOF10: 4
PAINLEVEL_OUTOF10: 8
PAINLEVEL_OUTOF10: 6
PAINLEVEL_OUTOF10: 2

## 2025-02-01 ASSESSMENT — PAIN DESCRIPTION - LOCATION
LOCATION: BACK

## 2025-02-01 ASSESSMENT — PAIN DESCRIPTION - DESCRIPTORS
DESCRIPTORS: ACHING;DISCOMFORT
DESCRIPTORS: SHARP
DESCRIPTORS: ACHING;PRESSURE

## 2025-02-01 ASSESSMENT — PAIN DESCRIPTION - ORIENTATION
ORIENTATION: LOWER
ORIENTATION: POSTERIOR
ORIENTATION: POSTERIOR
ORIENTATION: LOWER

## 2025-02-01 NOTE — PROGRESS NOTES
[facility]                                                                                          Hospitalist Progress Note  Sabi Dorado MD  Office Phone: [phone]        Date of Service:  2025  NAME:  Josh Almodovar  :  1945  MRN:  [id number]       Admission HPI:   Mr. Almodovar is a 79 y.o. male with a past medical history of coronary disease status post CABG, hypertension, diabetes, CVA depression who is admitted with SIRS.  Mr. Almodovar states that since Monday he has been experiencing fevers chills, fatigue.  He also endorses nausea vomiting diarrhea.  He states he is not feeling any dyspnea or cough.  Of note, he states this morning he fell getting out of bed; states he just felt weak.  He does not think he hit his head but is not sure.  He states that tested positive for COVID at home.  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        Interval history / Subjective:   PCR indicating likely MSSA, but final culture results pending.  Echo reviewed.  Patient feels weak but has no other complaints.     Assessment & Plan:     Staph Bacteremia: Positive in 2/2 bottles today. Repeat cultures pending. ID consult. Continue Ancef/daptomycin abx for now, WBC improving. Suspect primarily effect from doxy. Uncertain source, question contaminant.  Repeat cultures drawn today.  If repeat positive, may need CT c/a/p, echo reviewed.  Febrile again today    SIRS POA: Meets criteria with fever, tachycardia and leukocytosis but no obvious source.  Lactic acid within normal limits.  Blood cultures 2/2 25 positive for staph. Chest x-ray with no signs of pneumonia; may consider CT however patient denies any dyspnea or cough. Continue Ancef/daptomycin; avoiding vancomycin for now given renal function however low threshold to add if patient decompensates.  Is on daptomycin as above     Concern for

## 2025-02-01 NOTE — PROGRESS NOTES
[facility] Progress Note  Mir Ramsey DO  [phone] Office  [phone] Fax    2025      Assessment & Plan:     Staphylococcus aureus bacteremia.  Likely MSSA based on blood PCR ID panel.  Serial blood cultures to document sterilization of blood.  Check echocardiogram.  Continue daptomycin and cefazolin.  May stop daptomycin once MRSA has been ruled out.    COVID.  Patient asymptomatic.  Supportive care  TONI.  Creatinine better today avoid nephrotoxic medications  Leukocytosis.  Due to above.  Resolved  Thrombocytopenia.  Likely due to infection.  Resolved  Low back pain.  MRI lumbar spine negative for discitis   Diabetes mellitus.  This is poorly controlled.  Hemoglobin A1c is 8.4  Depression.  Patient on Lexapro and Wellbutrin          Subjective:     No new complaints    Objective:     Vitals: /72   Pulse 60   Temp 98.4 °F (36.9 °C) (Oral)   Resp 16   Ht 1.854 m (6' 1\")   Wt 103.4 kg (228 lb)   SpO2 98%   BMI 30.08 kg/m²      Tmax:  Temp (24hrs), Av °F (37.2 °C), Min:97.9 °F (36.6 °C), Max:101.5 °F (38.6 °C)      Exam:   Patient is intubated:  no    Physical Examination:   General:  Alert, cooperative, no distress   Head:  Normocephalic, atraumatic.   Eyes:  Conjunctivae clear   Neck: Supple       Lungs:   No distress.     Chest wall:     Heart:     Abdomen:   non-distended   Extremities: Moves all.    Skin: No acute rash on exposed skin   Neurologic: CNII-XII intact. Normal strength     Labs:        Invalid input(s): \"ITNL\"   No results for input(s): \"CPK\", \"CKMB\" in the last 72 hours.    Invalid input(s): \"TROIQ\"  Recent Labs     25  1534 25  0343 25  0631   * 132* 130*   K 3.2* 3.3* 2.9*   CL 95* 97 100   CO2 22 24 22   BUN 28* 38* 40*   WBC 19.0* 11.7* 8.7   HGB 11.0* 9.2* 9.9*   HCT 34.2* 28.9* 31.0*    138* 153     No results for input(s): \"INR\", \"APTT\" in the last 72 hours.    Invalid input(s): \"PTP\"  Needs: urine  analysis, urine sodium, protein and creatinine  No results found for: \"KU\"      Cultures:     Lab Results   Component Value Date/Time    SDES URINE 11/04/2013 11:07 AM     No components found for: \"CULT\"    Radiology:     Medications       [unfilled]        Case discussed with: Patient's wife via phone.  Nursing    A total time of 35 minutes was spent on today's encounter.  Greater than 50% of the time was spent on the following:  Preparing for visit and chart review.  Obtaining and/or reviewing separately obtained history  Performing a medically appropriate exam and/or evaluation  Counseling and educating a patient/family/caregiver as noted above  Placing relevant orders  Referring and communicating with other professionals (not separately reported)  Independently interpreting results (not separately reported) and communicating results to the patient/family/caregiver  Care coordination (not separately reported) as noted above  Documenting clinical information in the electronic health records (e.g. problem list, visit note) on the day of the encounter       Mir Ramsey DO

## 2025-02-02 LAB
ANION GAP SERPL CALC-SCNC: 7 MMOL/L (ref 2–12)
BACTERIA SPEC CULT: ABNORMAL
BASOPHILS # BLD: 0.01 K/UL (ref 0–0.1)
BASOPHILS NFR BLD: 0.1 % (ref 0–1)
BUN SERPL-MCNC: 30 MG/DL (ref 6–20)
BUN/CREAT SERPL: 24 (ref 12–20)
CALCIUM SERPL-MCNC: 8.4 MG/DL (ref 8.5–10.1)
CHLORIDE SERPL-SCNC: 99 MMOL/L (ref 97–108)
CO2 SERPL-SCNC: 24 MMOL/L (ref 21–32)
CREAT SERPL-MCNC: 1.27 MG/DL (ref 0.7–1.3)
DIFFERENTIAL METHOD BLD: ABNORMAL
EOSINOPHIL # BLD: 0.01 K/UL (ref 0–0.4)
EOSINOPHIL NFR BLD: 0.1 % (ref 0–7)
ERYTHROCYTE [DISTWIDTH] IN BLOOD BY AUTOMATED COUNT: 16.1 % (ref 11.5–14.5)
EST. AVERAGE GLUCOSE BLD GHB EST-MCNC: 203 MG/DL
GLUCOSE BLD STRIP.AUTO-MCNC: 229 MG/DL (ref 65–117)
GLUCOSE BLD STRIP.AUTO-MCNC: 257 MG/DL (ref 65–117)
GLUCOSE BLD STRIP.AUTO-MCNC: 260 MG/DL (ref 65–117)
GLUCOSE BLD STRIP.AUTO-MCNC: 266 MG/DL (ref 65–117)
GLUCOSE BLD STRIP.AUTO-MCNC: 291 MG/DL (ref 65–117)
GLUCOSE SERPL-MCNC: 202 MG/DL (ref 65–100)
HBA1C MFR BLD: 8.7 % (ref 4–5.6)
HCT VFR BLD AUTO: 27.4 % (ref 36.6–50.3)
HGB BLD-MCNC: 8.8 G/DL (ref 12.1–17)
IMM GRANULOCYTES # BLD AUTO: 0.04 K/UL (ref 0–0.04)
IMM GRANULOCYTES NFR BLD AUTO: 0.5 % (ref 0–0.5)
LYMPHOCYTES # BLD: 0.8 K/UL (ref 0.8–3.5)
LYMPHOCYTES NFR BLD: 9.5 % (ref 12–49)
MCH RBC QN AUTO: 18.8 PG (ref 26–34)
MCHC RBC AUTO-ENTMCNC: 32.1 G/DL (ref 30–36.5)
MCV RBC AUTO: 58.4 FL (ref 80–99)
MONOCYTES # BLD: 0.81 K/UL (ref 0–1)
MONOCYTES NFR BLD: 9.6 % (ref 5–13)
NEUTS SEG # BLD: 6.74 K/UL (ref 1.8–8)
NEUTS SEG NFR BLD: 80.2 % (ref 32–75)
NRBC # BLD: 0 K/UL (ref 0–0.01)
NRBC BLD-RTO: 0 PER 100 WBC
PLATELET # BLD AUTO: 159 K/UL (ref 150–400)
PMV BLD AUTO: 10.2 FL (ref 8.9–12.9)
POTASSIUM SERPL-SCNC: 3 MMOL/L (ref 3.5–5.1)
RBC # BLD AUTO: 4.69 M/UL (ref 4.1–5.7)
RBC MORPH BLD: ABNORMAL
SERVICE CMNT-IMP: ABNORMAL
SODIUM SERPL-SCNC: 130 MMOL/L (ref 136–145)
WBC # BLD AUTO: 8.4 K/UL (ref 4.1–11.1)

## 2025-02-02 PROCEDURE — 6360000002 HC RX W HCPCS: Performed by: INTERNAL MEDICINE

## 2025-02-02 PROCEDURE — 2500000003 HC RX 250 WO HCPCS: Performed by: INTERNAL MEDICINE

## 2025-02-02 PROCEDURE — 83036 HEMOGLOBIN GLYCOSYLATED A1C: CPT

## 2025-02-02 PROCEDURE — 80048 BASIC METABOLIC PNL TOTAL CA: CPT

## 2025-02-02 PROCEDURE — 6370000000 HC RX 637 (ALT 250 FOR IP): Performed by: INTERNAL MEDICINE

## 2025-02-02 PROCEDURE — 6370000000 HC RX 637 (ALT 250 FOR IP)

## 2025-02-02 PROCEDURE — 85025 COMPLETE CBC W/AUTO DIFF WBC: CPT

## 2025-02-02 PROCEDURE — 36415 COLL VENOUS BLD VENIPUNCTURE: CPT

## 2025-02-02 PROCEDURE — 2580000003 HC RX 258: Performed by: FAMILY MEDICINE

## 2025-02-02 PROCEDURE — 82962 GLUCOSE BLOOD TEST: CPT

## 2025-02-02 PROCEDURE — 94761 N-INVAS EAR/PLS OXIMETRY MLT: CPT

## 2025-02-02 PROCEDURE — 2060000000 HC ICU INTERMEDIATE R&B

## 2025-02-02 RX ORDER — POTASSIUM CHLORIDE 750 MG/1
40 TABLET, EXTENDED RELEASE ORAL ONCE
Status: DISCONTINUED | OUTPATIENT
Start: 2025-02-02 | End: 2025-02-02

## 2025-02-02 RX ADMIN — CARVEDILOL 6.25 MG: 6.25 TABLET, FILM COATED ORAL at 22:44

## 2025-02-02 RX ADMIN — INSULIN LISPRO 4 UNITS: 100 INJECTION, SOLUTION INTRAVENOUS; SUBCUTANEOUS at 09:30

## 2025-02-02 RX ADMIN — WATER 2000 MG: 1 INJECTION INTRAMUSCULAR; INTRAVENOUS; SUBCUTANEOUS at 01:53

## 2025-02-02 RX ADMIN — HEPARIN SODIUM 5000 UNITS: 5000 INJECTION INTRAVENOUS; SUBCUTANEOUS at 13:31

## 2025-02-02 RX ADMIN — SODIUM CHLORIDE, PRESERVATIVE FREE 10 ML: 5 INJECTION INTRAVENOUS at 22:49

## 2025-02-02 RX ADMIN — POTASSIUM CHLORIDE 10 MEQ: 7.46 INJECTION, SOLUTION INTRAVENOUS at 11:01

## 2025-02-02 RX ADMIN — WATER 2000 MG: 1 INJECTION INTRAMUSCULAR; INTRAVENOUS; SUBCUTANEOUS at 09:30

## 2025-02-02 RX ADMIN — ESCITALOPRAM OXALATE 20 MG: 10 TABLET ORAL at 09:30

## 2025-02-02 RX ADMIN — SODIUM CHLORIDE: 9 INJECTION, SOLUTION INTRAVENOUS at 05:38

## 2025-02-02 RX ADMIN — POTASSIUM CHLORIDE 10 MEQ: 7.46 INJECTION, SOLUTION INTRAVENOUS at 13:31

## 2025-02-02 RX ADMIN — WATER 2000 MG: 1 INJECTION INTRAMUSCULAR; INTRAVENOUS; SUBCUTANEOUS at 18:07

## 2025-02-02 RX ADMIN — POTASSIUM CHLORIDE 10 MEQ: 7.46 INJECTION, SOLUTION INTRAVENOUS at 12:15

## 2025-02-02 RX ADMIN — SODIUM CHLORIDE, PRESERVATIVE FREE 10 ML: 5 INJECTION INTRAVENOUS at 09:32

## 2025-02-02 RX ADMIN — SODIUM CHLORIDE: 9 INJECTION, SOLUTION INTRAVENOUS at 16:41

## 2025-02-02 RX ADMIN — POTASSIUM CHLORIDE 10 MEQ: 7.46 INJECTION, SOLUTION INTRAVENOUS at 06:47

## 2025-02-02 RX ADMIN — POTASSIUM CHLORIDE 10 MEQ: 7.46 INJECTION, SOLUTION INTRAVENOUS at 05:53

## 2025-02-02 RX ADMIN — FINASTERIDE 5 MG: 5 TABLET, FILM COATED ORAL at 09:31

## 2025-02-02 RX ADMIN — OXYCODONE 5 MG: 5 TABLET ORAL at 13:37

## 2025-02-02 RX ADMIN — INSULIN LISPRO 4 UNITS: 100 INJECTION, SOLUTION INTRAVENOUS; SUBCUTANEOUS at 12:34

## 2025-02-02 RX ADMIN — POTASSIUM CHLORIDE 10 MEQ: 7.46 INJECTION, SOLUTION INTRAVENOUS at 09:41

## 2025-02-02 RX ADMIN — INSULIN LISPRO 2 UNITS: 100 INJECTION, SOLUTION INTRAVENOUS; SUBCUTANEOUS at 17:37

## 2025-02-02 RX ADMIN — INSULIN LISPRO 4 UNITS: 100 INJECTION, SOLUTION INTRAVENOUS; SUBCUTANEOUS at 22:48

## 2025-02-02 RX ADMIN — OXYCODONE 5 MG: 5 TABLET ORAL at 05:47

## 2025-02-02 RX ADMIN — TAMSULOSIN HYDROCHLORIDE 0.4 MG: 0.4 CAPSULE ORAL at 09:31

## 2025-02-02 RX ADMIN — BUPROPION HYDROCHLORIDE 300 MG: 150 TABLET, EXTENDED RELEASE ORAL at 09:30

## 2025-02-02 RX ADMIN — HEPARIN SODIUM 5000 UNITS: 5000 INJECTION INTRAVENOUS; SUBCUTANEOUS at 22:48

## 2025-02-02 ASSESSMENT — PAIN DESCRIPTION - ORIENTATION
ORIENTATION: LOWER

## 2025-02-02 ASSESSMENT — PAIN DESCRIPTION - DESCRIPTORS
DESCRIPTORS: SHARP

## 2025-02-02 ASSESSMENT — PAIN DESCRIPTION - LOCATION
LOCATION: BACK

## 2025-02-02 ASSESSMENT — PAIN SCALES - GENERAL
PAINLEVEL_OUTOF10: 6
PAINLEVEL_OUTOF10: 3

## 2025-02-02 NOTE — PLAN OF CARE
Problem: Safety - Adult  Goal: Free from fall injury  Outcome: Progressing     Problem: Chronic Conditions and Co-morbidities  Goal: Patient's chronic conditions and co-morbidity symptoms are monitored and maintained or improved  Outcome: Progressing     Problem: Discharge Planning  Goal: Discharge to home or other facility with appropriate resources  Outcome: Progressing     Problem: Skin/Tissue Integrity  Goal: Skin integrity remains intact  Description: 1.  Monitor for areas of redness and/or skin breakdown  2.  Assess vascular access sites hourly  3.  Every 4-6 hours minimum:  Change oxygen saturation probe site  4.  Every 4-6 hours:  If on nasal continuous positive airway pressure, respiratory therapy assess nares and determine need for appliance change or resting period  Outcome: Progressing     Problem: Pain  Goal: Verbalizes/displays adequate comfort level or baseline comfort level  Outcome: Progressing

## 2025-02-02 NOTE — PROGRESS NOTES
lidocaine 4 % external patch 1 patch  1 patch TransDERmal Daily    ceFAZolin (ANCEF) 2,000 mg in sterile water 20 mL IV syringe  2,000 mg IntraVENous Q8H    lidocaine 4 % external patch 1 patch  1 patch TransDERmal Daily PRN    sodium chloride flush 0.9 % injection 5-40 mL  5-40 mL IntraVENous 2 times per day    sodium chloride flush 0.9 % injection 5-40 mL  5-40 mL IntraVENous PRN    0.9 % sodium chloride infusion   IntraVENous PRN    potassium chloride (KLOR-CON) extended release tablet 40 mEq  40 mEq Oral PRN    Or    potassium bicarb-citric acid (EFFER-K) effervescent tablet 40 mEq  40 mEq Oral PRN    Or    potassium chloride 10 mEq/100 mL IVPB (Peripheral Line)  10 mEq IntraVENous PRN    magnesium sulfate 2000 mg in 50 mL IVPB premix  2,000 mg IntraVENous PRN    ondansetron (ZOFRAN-ODT) disintegrating tablet 4 mg  4 mg Oral Q8H PRN    Or    ondansetron (ZOFRAN) injection 4 mg  4 mg IntraVENous Q6H PRN    polyethylene glycol (GLYCOLAX) packet 17 g  17 g Oral Daily PRN    acetaminophen (TYLENOL) tablet 650 mg  650 mg Oral Q6H PRN    Or    acetaminophen (TYLENOL) suppository 650 mg  650 mg Rectal Q6H PRN    [Held by provider] heparin (porcine) injection 5,000 Units  5,000 Units SubCUTAneous 3 times per day    glucose chewable tablet 16 g  4 tablet Oral PRN    glucagon injection 1 mg  1 mg SubCUTAneous PRN    dextrose 10 % infusion   IntraVENous Continuous PRN    0.9 % sodium chloride infusion   IntraVENous Continuous    [Held by provider] aspirin EC tablet 81 mg  81 mg Oral Daily    buPROPion (WELLBUTRIN XL) extended release tablet 300 mg  300 mg Oral QAM    [Held by provider] carvedilol (COREG) tablet 6.25 mg  6.25 mg Oral BID    escitalopram (LEXAPRO) tablet 20 mg  20 mg Oral Daily    finasteride (PROSCAR) tablet 5 mg  5 mg Oral Daily    tamsulosin (FLOMAX) capsule 0.4 mg  0.4 mg Oral Daily    [Held by provider] rosuvastatin (CRESTOR) tablet 10 mg  10 mg Oral Daily        Lab Data Reviewed: (see below)  Lab  Review:     Recent Labs     01/31/25  0343 02/01/25  0631 02/02/25  0225   WBC 11.7* 8.7 8.4   HGB 9.2* 9.9* 8.8*   HCT 28.9* 31.0* 27.4*   * 153 159     Recent Labs     01/30/25  1534 01/31/25  0343 02/01/25 0631 02/02/25 0225   * 132* 130* 130*   K 3.2* 3.3* 2.9* 3.0*   CL 95* 97 100 99   CO2 22 24 22 24   BUN 28* 38* 40* 30*   ALT 26  --   --   --      No results found for: \"GLUCPOC\"  No results for input(s): \"PH\", \"PCO2\", \"PO2\", \"HCO3\", \"FIO2\" in the last 72 hours.  No results for input(s): \"INR\" in the last 72 hours.  [unfilled]    I have reviewed notes of prior 24hr.    Other pertinent lab:     Total time: -35- minutes. I personally saw and examined the patient during this time period.  Greater than 50% of this time was spent in counseling and coordination of care.    I personally reviewed chart, notes, data and current medications in the medical record.  I have personally examined and treated the patient at bedside during this period.                 Care Plan discussed with: Patient, Nursing Staff, and >50% of time spent in counseling and coordination of care    Discussed:  Care Plan    Prophylaxis:  Hep SQ    Disposition:  Home w/Family           ___________________________________________________    Attending Physician: Devan Núñez MD

## 2025-02-03 LAB
ALBUMIN SERPL-MCNC: 2.4 G/DL (ref 3.5–5)
ANION GAP SERPL CALC-SCNC: 8 MMOL/L (ref 2–12)
BASOPHILS # BLD: 0.01 K/UL (ref 0–0.1)
BASOPHILS NFR BLD: 0.1 % (ref 0–1)
BUN SERPL-MCNC: 24 MG/DL (ref 6–20)
BUN/CREAT SERPL: 24 (ref 12–20)
CALCIUM SERPL-MCNC: 8.4 MG/DL (ref 8.5–10.1)
CHLORIDE SERPL-SCNC: 100 MMOL/L (ref 97–108)
CO2 SERPL-SCNC: 23 MMOL/L (ref 21–32)
CREAT SERPL-MCNC: 0.99 MG/DL (ref 0.7–1.3)
DIFFERENTIAL METHOD BLD: ABNORMAL
EOSINOPHIL # BLD: 0.04 K/UL (ref 0–0.4)
EOSINOPHIL NFR BLD: 0.5 % (ref 0–7)
ERYTHROCYTE [DISTWIDTH] IN BLOOD BY AUTOMATED COUNT: 16 % (ref 11.5–14.5)
GLUCOSE BLD STRIP.AUTO-MCNC: 260 MG/DL (ref 65–117)
GLUCOSE BLD STRIP.AUTO-MCNC: 264 MG/DL (ref 65–117)
GLUCOSE BLD STRIP.AUTO-MCNC: 268 MG/DL (ref 65–117)
GLUCOSE BLD STRIP.AUTO-MCNC: 286 MG/DL (ref 65–117)
GLUCOSE SERPL-MCNC: 155 MG/DL (ref 65–100)
HCT VFR BLD AUTO: 26 % (ref 36.6–50.3)
HGB BLD-MCNC: 8.5 G/DL (ref 12.1–17)
IMM GRANULOCYTES # BLD AUTO: 0.03 K/UL (ref 0–0.04)
IMM GRANULOCYTES NFR BLD AUTO: 0.4 % (ref 0–0.5)
LYMPHOCYTES # BLD: 1.12 K/UL (ref 0.8–3.5)
LYMPHOCYTES NFR BLD: 15.1 % (ref 12–49)
MAGNESIUM SERPL-MCNC: 1.9 MG/DL (ref 1.6–2.4)
MCH RBC QN AUTO: 19.1 PG (ref 26–34)
MCHC RBC AUTO-ENTMCNC: 32.7 G/DL (ref 30–36.5)
MCV RBC AUTO: 58.3 FL (ref 80–99)
MONOCYTES # BLD: 0.9 K/UL (ref 0–1)
MONOCYTES NFR BLD: 12.1 % (ref 5–13)
NEUTS SEG # BLD: 5.3 K/UL (ref 1.8–8)
NEUTS SEG NFR BLD: 71.8 % (ref 32–75)
NRBC # BLD: 0 K/UL (ref 0–0.01)
NRBC BLD-RTO: 0 PER 100 WBC
PHOSPHATE SERPL-MCNC: 1.9 MG/DL (ref 2.6–4.7)
PLATELET # BLD AUTO: 202 K/UL (ref 150–400)
PMV BLD AUTO: 10.3 FL (ref 8.9–12.9)
POTASSIUM SERPL-SCNC: 3.2 MMOL/L (ref 3.5–5.1)
RBC # BLD AUTO: 4.46 M/UL (ref 4.1–5.7)
RBC MORPH BLD: ABNORMAL
SERVICE CMNT-IMP: ABNORMAL
SODIUM SERPL-SCNC: 131 MMOL/L (ref 136–145)
WBC # BLD AUTO: 7.4 K/UL (ref 4.1–11.1)

## 2025-02-03 PROCEDURE — 94761 N-INVAS EAR/PLS OXIMETRY MLT: CPT

## 2025-02-03 PROCEDURE — 6370000000 HC RX 637 (ALT 250 FOR IP): Performed by: INTERNAL MEDICINE

## 2025-02-03 PROCEDURE — 97535 SELF CARE MNGMENT TRAINING: CPT

## 2025-02-03 PROCEDURE — 2500000003 HC RX 250 WO HCPCS: Performed by: INTERNAL MEDICINE

## 2025-02-03 PROCEDURE — 97530 THERAPEUTIC ACTIVITIES: CPT

## 2025-02-03 PROCEDURE — 6360000002 HC RX W HCPCS: Performed by: INTERNAL MEDICINE

## 2025-02-03 PROCEDURE — 6370000000 HC RX 637 (ALT 250 FOR IP)

## 2025-02-03 PROCEDURE — 82962 GLUCOSE BLOOD TEST: CPT

## 2025-02-03 PROCEDURE — 80069 RENAL FUNCTION PANEL: CPT

## 2025-02-03 PROCEDURE — 83735 ASSAY OF MAGNESIUM: CPT

## 2025-02-03 PROCEDURE — 97116 GAIT TRAINING THERAPY: CPT

## 2025-02-03 PROCEDURE — 99232 SBSQ HOSP IP/OBS MODERATE 35: CPT | Performed by: INTERNAL MEDICINE

## 2025-02-03 PROCEDURE — 2580000003 HC RX 258: Performed by: FAMILY MEDICINE

## 2025-02-03 PROCEDURE — 1100000000 HC RM PRIVATE

## 2025-02-03 PROCEDURE — 2580000003 HC RX 258: Performed by: INTERNAL MEDICINE

## 2025-02-03 PROCEDURE — 97165 OT EVAL LOW COMPLEX 30 MIN: CPT

## 2025-02-03 PROCEDURE — 85025 COMPLETE CBC W/AUTO DIFF WBC: CPT

## 2025-02-03 RX ADMIN — ROSUVASTATIN CALCIUM 10 MG: 10 TABLET, FILM COATED ORAL at 08:14

## 2025-02-03 RX ADMIN — SODIUM CHLORIDE: 9 INJECTION, SOLUTION INTRAVENOUS at 17:04

## 2025-02-03 RX ADMIN — FINASTERIDE 5 MG: 5 TABLET, FILM COATED ORAL at 08:14

## 2025-02-03 RX ADMIN — BUPROPION HYDROCHLORIDE 300 MG: 150 TABLET, EXTENDED RELEASE ORAL at 08:14

## 2025-02-03 RX ADMIN — INSULIN LISPRO 4 UNITS: 100 INJECTION, SOLUTION INTRAVENOUS; SUBCUTANEOUS at 17:48

## 2025-02-03 RX ADMIN — HEPARIN SODIUM 5000 UNITS: 5000 INJECTION INTRAVENOUS; SUBCUTANEOUS at 14:08

## 2025-02-03 RX ADMIN — POTASSIUM CHLORIDE 40 MEQ: 750 TABLET, EXTENDED RELEASE ORAL at 08:14

## 2025-02-03 RX ADMIN — TAMSULOSIN HYDROCHLORIDE 0.4 MG: 0.4 CAPSULE ORAL at 08:14

## 2025-02-03 RX ADMIN — HEPARIN SODIUM 5000 UNITS: 5000 INJECTION INTRAVENOUS; SUBCUTANEOUS at 22:07

## 2025-02-03 RX ADMIN — CARVEDILOL 6.25 MG: 6.25 TABLET, FILM COATED ORAL at 22:07

## 2025-02-03 RX ADMIN — HEPARIN SODIUM 5000 UNITS: 5000 INJECTION INTRAVENOUS; SUBCUTANEOUS at 05:41

## 2025-02-03 RX ADMIN — NAFCILLIN SODIUM 2000 MG: 2 INJECTION, POWDER, LYOPHILIZED, FOR SOLUTION INTRAMUSCULAR; INTRAVENOUS at 22:09

## 2025-02-03 RX ADMIN — CARVEDILOL 6.25 MG: 6.25 TABLET, FILM COATED ORAL at 08:14

## 2025-02-03 RX ADMIN — SODIUM CHLORIDE, PRESERVATIVE FREE 10 ML: 5 INJECTION INTRAVENOUS at 08:15

## 2025-02-03 RX ADMIN — INSULIN LISPRO 4 UNITS: 100 INJECTION, SOLUTION INTRAVENOUS; SUBCUTANEOUS at 22:08

## 2025-02-03 RX ADMIN — ASPIRIN 81 MG: 81 TABLET, COATED ORAL at 08:14

## 2025-02-03 RX ADMIN — WATER 2000 MG: 1 INJECTION INTRAMUSCULAR; INTRAVENOUS; SUBCUTANEOUS at 10:12

## 2025-02-03 RX ADMIN — WATER 2000 MG: 1 INJECTION INTRAMUSCULAR; INTRAVENOUS; SUBCUTANEOUS at 02:07

## 2025-02-03 RX ADMIN — NAFCILLIN SODIUM 2000 MG: 2 INJECTION, POWDER, LYOPHILIZED, FOR SOLUTION INTRAMUSCULAR; INTRAVENOUS at 17:06

## 2025-02-03 RX ADMIN — INSULIN LISPRO 4 UNITS: 100 INJECTION, SOLUTION INTRAVENOUS; SUBCUTANEOUS at 12:56

## 2025-02-03 RX ADMIN — INSULIN LISPRO 4 UNITS: 100 INJECTION, SOLUTION INTRAVENOUS; SUBCUTANEOUS at 08:47

## 2025-02-03 RX ADMIN — ESCITALOPRAM OXALATE 20 MG: 10 TABLET ORAL at 08:14

## 2025-02-03 RX ADMIN — POLYETHYLENE GLYCOL 3350 17 G: 17 POWDER, FOR SOLUTION ORAL at 10:25

## 2025-02-03 RX ADMIN — POTASSIUM PHOSPHATE, MONOBASIC POTASSIUM PHOSPHATE, DIBASIC 30 MMOL: 224; 236 INJECTION, SOLUTION, CONCENTRATE INTRAVENOUS at 08:18

## 2025-02-03 NOTE — PROGRESS NOTES
[facility]  [address] [place] [address] [phone]      Hospitalist Progress Note      NAME: Josh Almodovar   :  1945  MRM:  [id number]    Date of service: 2/3/2025  11:36 AM       Assessment and Plan:   Staph Bacteremia: Positive in 2/2 bottles. Repeat BCx negative so far.  Evaluated by ID. Continue Ancef.  DC daptomycin as MRSA of nasal swab is negative.  Echo is negative for vegetation.  WBC improving.      2.  SIRS: Meets criteria with fever, tachycardia and leukocytosis but no obvious source.  Lactic acid within normal limits.  Blood cultures as above. Chest x-ray with no signs of pneumonia. Continue Ancef     3.  Lumbar Pain: No neuro deficits. Had GLF prior to arrival to ER.  Intermittent episodes in the past. MRI of L-spine: No evidence of discitis.  Severe right L4-L5 neural foraminal stenosis.     4.  TONI/flank pain: Resolved.  Suspect due to intravascular volume depletion due to diarrhea nausea vomiting exasperated by home lisinopril and chlorthalidone.  Renal US 25 unremarkable. UA unremarkable.  Avoid nephrotoxins. Gentle IVF      5.  Anemia: Iron deficient. Monitor, trend H/H     6.  Fall: States happened prior to admission this morning. CT head and CT cervical spine negative. Consult PT      7.  Coronary artery disease S/P CABG x 1/ HTN : Denies any acute chest pain.  Continue home medication.     8.  History of CVA: No obvious defects.  Continue aspirin     9.  BPH: continue finasteride and flomax      10.  DM (diabetes mellitus), type 2 with complications (HCC): A1c is 8.4%. Hold home oral meds. Insulin sliding scale.     11.  Major depressive disorder, recurrent, moderate: continue lexapro and Wellbutrin     12.  Hypokalemia/hypophosphatemia.  Replete         Subjective:     Chief Complaint:: Patient was seen and examined as a follow up for staph bacteremia.  Chart was reviewed.  C/O generalized body weakness    ROS:  (bold if  0-8 Units SubCUTAneous 4x Daily AC & HS    oxyCODONE (ROXICODONE) immediate release tablet 5 mg  5 mg Oral Q6H PRN    lidocaine 4 % external patch 1 patch  1 patch TransDERmal Daily    ceFAZolin (ANCEF) 2,000 mg in sterile water 20 mL IV syringe  2,000 mg IntraVENous Q8H    lidocaine 4 % external patch 1 patch  1 patch TransDERmal Daily PRN    sodium chloride flush 0.9 % injection 5-40 mL  5-40 mL IntraVENous 2 times per day    sodium chloride flush 0.9 % injection 5-40 mL  5-40 mL IntraVENous PRN    0.9 % sodium chloride infusion   IntraVENous PRN    potassium chloride (KLOR-CON) extended release tablet 40 mEq  40 mEq Oral PRN    Or    potassium bicarb-citric acid (EFFER-K) effervescent tablet 40 mEq  40 mEq Oral PRN    Or    potassium chloride 10 mEq/100 mL IVPB (Peripheral Line)  10 mEq IntraVENous PRN    magnesium sulfate 2000 mg in 50 mL IVPB premix  2,000 mg IntraVENous PRN    ondansetron (ZOFRAN-ODT) disintegrating tablet 4 mg  4 mg Oral Q8H PRN    Or    ondansetron (ZOFRAN) injection 4 mg  4 mg IntraVENous Q6H PRN    polyethylene glycol (GLYCOLAX) packet 17 g  17 g Oral Daily PRN    acetaminophen (TYLENOL) tablet 650 mg  650 mg Oral Q6H PRN    Or    acetaminophen (TYLENOL) suppository 650 mg  650 mg Rectal Q6H PRN    heparin (porcine) injection 5,000 Units  5,000 Units SubCUTAneous 3 times per day    dextrose 10 % infusion   IntraVENous Continuous PRN    0.9 % sodium chloride infusion   IntraVENous Continuous    aspirin EC tablet 81 mg  81 mg Oral Daily    buPROPion (WELLBUTRIN XL) extended release tablet 300 mg  300 mg Oral QAM    carvedilol (COREG) tablet 6.25 mg  6.25 mg Oral BID    escitalopram (LEXAPRO) tablet 20 mg  20 mg Oral Daily    finasteride (PROSCAR) tablet 5 mg  5 mg Oral Daily    tamsulosin (FLOMAX) capsule 0.4 mg  0.4 mg Oral Daily    rosuvastatin (CRESTOR) tablet 10 mg  10 mg Oral Daily        Lab Data Reviewed: (see below)  Lab Review:     Recent Labs     02/01/25  0631 02/02/25  0778

## 2025-02-03 NOTE — PROGRESS NOTES
Bedside and Verbal shift change report given to ANGELICA Miller (oncoming nurse) by ANGELICA Dawson (offgoing nurse). Report included the following information Nurse Handoff Report, Index, Adult Overview, MAR, and Recent Results.     Bedside and Verbal shift change report given to ANGELICA Carey (oncoming nurse) by ANGELICA Miller (offgoing nurse). Report included the following information Nurse Handoff Report, Index, Adult Overview, Intake/Output, MAR, and Recent Results.

## 2025-02-03 NOTE — CARE COORDINATION
Care Management Progress Note    Reason for Admission:   Metabolic acidosis [E87.20]  Hyperglycemia [R73.9]  SIRS (systemic inflammatory response syndrome) ([facility]) [R65.10]  TONI (acute kidney injury) ([facility]) [N17.9]  Sepsis, due to unspecified organism, unspecified whether acute organ dysfunction present ([facility]) [A41.9]  COVID-19 virus infection [U07.1]  COVID-19 [U07.1]         Patient Admission Status: Inpatient  RUR:   Hospitalization in the last 30 days (Readmission):  No        Transition of care plan:     02/03/25 1636   Condition of Participation: Discharge Planning   The Patient and/or Patient Representative was provided with a Choice of Provider? Patient   The Patient and/Or Patient Representative agree with the Discharge Plan? Yes   Freedom of Choice list was provided with basic dialogue that supports the patient's individualized plan of care/goals, treatment preferences, and shares the quality data associated with the providers?  Yes     Patient with recs for IPR, CM met with patient to discuss. Patient declines IPR, wants HH. Patient will also need IV ABX at home. No preference as to providers. CM sent referral to [facility] for IV ABX and [facility] for HH therapy and nursing. CM following for acceptance. Family will transport.  ______________________  Carina CARRANZA, RN  Care Management  2/3/2025

## 2025-02-03 NOTE — PLAN OF CARE
Problem: Physical Therapy - Adult  Goal: By Discharge: Performs mobility at highest level of function for planned discharge setting.  See evaluation for individualized goals.  Description: FUNCTIONAL STATUS PRIOR TO ADMISSION: Patient was independent and active without use of DME.    HOME SUPPORT PRIOR TO ADMISSION: The patient lived with spouse but did not require assistance.  Spouse is a retired OT.    Physical Therapy Goals  Initiated 1/31/2025  1.  Patient will move from supine to sit and sit to supine , scoot up and down, and roll side to side in bed with modified independence within 7 day(s).    2.  Patient will transfer from bed to chair and chair to bed with modified independence using the least restrictive device within 7 day(s).  3.  Patient will perform sit to stand with modified independence within 7 day(s).  4.  Patient will ambulate with independence for 300 feet with the least restrictive device within 7 day(s).   5.  Patient will ascend/descend 4 stairs with 1 handrail(s) with modified independence within 7 day(s).      Outcome: Progressing   PHYSICAL THERAPY TREATMENT    Patient: Josh Almodovar (79 y.o. male)  Date: 2/3/2025  Diagnosis: Metabolic acidosis [E87.20]  Hyperglycemia [R73.9]  SIRS (systemic inflammatory response syndrome) ([facility]) [R65.10]  TONI (acute kidney injury) ([facility]) [N17.9]  Sepsis, due to unspecified organism, unspecified whether acute organ dysfunction present ([facility]) [A41.9]  COVID-19 virus infection [U07.1]  COVID-19 [U07.1] COVID-19      Precautions:                        ASSESSMENT:  Patient continues to benefit from skilled PT services and is slowly progressing towards goals. Patient received up in chair after working with OT recently. BP monitored with position changes with it remaining stable and patient denied symptoms of orthostatic hypotension.  Patient with increased RR at times, however, but related to \"trying to relax\" and sats WNL. Required CG to MIN A to rise to  standing with cueing for weight shifting and RW support. Patient moaning and sighing with ambulation to doorway and increasing trunk flexion and reliance on RW, reporting significant fatigue with just 15' of walking. Requested to return to bed vs chair, VSS.  Based on very limited activity tolerance, safety issues, impaired balance, patient is at increased risk for falls and unsafe for d/c home, is rehab stay to maximize recovery of full strength and balance to ensure safe discharge home with independent functional mobility.       PLAN:  Patient continues to benefit from skilled intervention to address the above impairments.  Continue treatment per established plan of care.    Recommendations for staff mobility and toileting assistance:  Recommend that staff completes patient mobility with assist x1 using gait belt and rolling walker.      Recommendation for discharge: (in order for the patient to meet his/her long term goals):   High intensity/comprehensive skilled physical therapy in a multidisciplinary setting as patient is working towards tolerating up to 3 hours of therapy/day 5-7x/week    Other factors to consider for discharge: patient's current support system is unable to meet their requirements for physical assistance, high risk for falls, not safe to be alone, and concern for safely navigating or managing the home environment    IF patient discharges home will need the following DME: continuing to assess with progress       SUBJECTIVE:   Patient stated, \"I just have no energy, I get fatigued so quicky.\"    OBJECTIVE DATA SUMMARY:   Critical Behavior:  Orientation  Overall Orientation Status: Within Normal Limits  Orientation Level: Oriented X4  Cognition  Overall Cognitive Status: WFL    Functional Mobility Training:  Bed Mobility:  Bed Mobility Training  Bed Mobility Training: Yes  Supine to Sit: Contact-guard assistance  Scooting: Stand-by assistance  Transfers:  Transfer Training  Transfer Training:

## 2025-02-03 NOTE — PROGRESS NOTES
Bedside and Verbal shift change report given to ANGELICA Miller (oncoming nurse) by ANGELICA Verduzco (offgoing nurse). Report included the following information Nurse Handoff Report, Index, Adult Overview, MAR, and Recent Results.     Bedside and Verbal shift change report given to ANGELICA Dawson (oncoming nurse) by ANGELICA Miller (offgoing nurse). Report included the following information Nurse Handoff Report, Index, Adult Overview, Intake/Output, and MAR.

## 2025-02-03 NOTE — PLAN OF CARE
Post Discharge PICC and Antibiotic Orders    1.  Diagnosis: MSSA bacteremia.  Possible lumbar spine infection  2.  Antibiotic: Nafcillin 2 g IV every 4 hours through 3/14/2025.  May give via continuous infusion pump  3.  Routine PICC/ Marlon/ Portacath Care including PRN catheter flow management  4.  Weekly labs:   [x] CBC/diff/platelets   [x] BUN/Creatinine   [] CPK   [x] AST/TotalBilirubin/AlkalinePhosphatase   [x] CRP   []Trough Vancomycin level goal 15-20  5.  Fax lab to [phone]  Call Critical Lab Values to [phone]  6.  May send to IR for line evaluation or replacement [phone]  7.  Home Health to pull PIC line at end of therapy or send to IR for Marlon removal  8.  Allergies:  No Known Allergies      Mir Ramsey DO

## 2025-02-03 NOTE — PROGRESS NOTES
[facility] Progress Note  Mir Ramsey DO  [phone] Office  [phone] Fax    2/3/2025      Assessment & Plan:     MSSA.  Repeat blood cultures are sterile to date.  TTE negative for vegetation.  With acute onset low back pain in setting of bacteremia with plan for 6-week course of IV antibiotics at discharge.  Plan discharge on nafcillin 2 g IV every 4 hours administered via continuous infusion pump.  IV antibiotic orders are in the chart.  Okay for discharge tomorrow as long as blood cultures remain sterile .    COVID.  Patient asymptomatic.  Supportive care  TONI.  Creatinine better today avoid nephrotoxic medications  Leukocytosis.  Due to above.  Resolved  Thrombocytopenia.  Likely due to infection.  Resolved  Low back pain.  MRI lumbar spine negative for discitis   Diabetes mellitus.  This is poorly controlled.  Hemoglobin A1c is 8.4  Depression.  Patient on Lexapro and Wellbutrin          Subjective:     No new complaints    Objective:     Vitals: BP (!) 155/73   Pulse 70   Temp 98.6 °F (37 °C) (Axillary)   Resp 13   Ht 1.854 m (6' 1\")   Wt 106.7 kg (235 lb 3.2 oz)   SpO2 100%   BMI 31.03 kg/m²      Tmax:  Temp (24hrs), Av.2 °F (37.3 °C), Min:98.4 °F (36.9 °C), Max:99.9 °F (37.7 °C)      Exam:   Patient is intubated:  no    Physical Examination:   General:  Alert, cooperative, no distress   Head:  Normocephalic, atraumatic.   Eyes:  Conjunctivae clear   Neck: Supple       Lungs:   No distress.     Chest wall:     Heart:     Abdomen:   non-distended   Extremities: Moves all.    Skin: No acute rash on exposed skin   Neurologic: CNII-XII intact. Normal strength     Labs:        Invalid input(s): \"ITNL\"   No results for input(s): \"CPK\", \"CKMB\" in the last 72 hours.    Invalid input(s): \"TROIQ\"  Recent Labs     25  0631 25  0225 25  0206   * 130* 131*   K 2.9* 3.0* 3.2*    99 100   CO2 22 24 23   BUN 40* 30* 24*   PHOS  --   --  1.9*   MG

## 2025-02-03 NOTE — PLAN OF CARE
Problem: Occupational Therapy - Adult  Goal: By Discharge: Performs self-care activities at highest level of function for planned discharge setting.  See evaluation for individualized goals.  Description: FUNCTIONAL STATUS PRIOR TO ADMISSION:    Receives Help From: Other (Comment) (independent), Prior Level of Assist for ADLs: Independent,  ,  ,  ,  ,  , Prior Level of Assist for Homemaking: Independent, Ambulation Assistance: Independent, Prior Level of Assist for Transfers: Independent, Active : Yes     HOME SUPPORT: Patient lived with spouse but didn't require assistance.    Occupational Therapy Goals:  Initiated 2/3/2025  1.  Patient will perform lower body dressing with AE PRN with Minimal Assist within 7 day(s).  2.  Patient will perform grooming in standing with stable vitals and no LOB or instability with Contact Guard Assist within 7 day(s).  3.  Patient will perform anterior bathing with Stand by Assist and Set-up within 7 day(s).  4.  Patient will perform toilet transfers with Contact Guard Assist  within 7 day(s).  5.  Patient will perform all aspects of toileting with Contact Guard Assist within 7 day(s).  6.  Patient will participate in upper extremity therapeutic exercise/activities with Stand by Assist for 10 minutes within 7 day(s).    7.  Patient will utilize energy conservation techniques during functional activities with verbal and visual cues within 7 day(s).    Outcome: Progressing   OCCUPATIONAL THERAPY EVALUATION    Patient: Josh Almodovar (79 y.o. male)  Date: 2/3/2025  Primary Diagnosis: Metabolic acidosis [E87.20]  Hyperglycemia [R73.9]  SIRS (systemic inflammatory response syndrome) ([facility]) [R65.10]  TONI (acute kidney injury) ([facility]) [N17.9]  Sepsis, due to unspecified organism, unspecified whether acute organ dysfunction present ([facility]) [A41.9]  COVID-19 virus infection [U07.1]  COVID-19 [U07.1]         Precautions:                    ASSESSMENT :  The patient is limited by

## 2025-02-03 NOTE — PROGRESS NOTES
Physician Progress Note      PATIENT:               CHELLY BUTLER  CSN #:                  [id number]  :                       1945  ADMIT DATE:       2025 3:32 PM  DISCH DATE:  RESPONDING  PROVIDER #:        Devan Núñez MD          QUERY TEXT:    Good day  Pt admitted with bacteremia.  Pt noted to have fever, TONI, elevated WBCs.    If possible, please document in the progress notes and discharge summary if   you are evaluating and /or treating any of the following:    The medical record reflects the following:  Risk Factors: age, acute illness, staph bacteremia, elevated WBDs, TONI, fever  Clinical Indicators: Patient presented with fever and fatigue. Found to have   stah bacteremia 2/2 and TONI with CR-1.68. T-102.9  Procal <0.05, LA-2.0, BC- staph aureus  Treatment: daily labs, IV Ancef, Cefipime, Daptomycin, Vibramycin, IVF bolus    Thank you  Adrianne Knox RN [facility]  [phone]  Options provided:  -- Sepsis 2/2 staph bacteremia, present on admission  -- Sepsis was ruled out  -- Other - I will add my own diagnosis  -- Disagree - Not applicable / Not valid  -- Disagree - Clinically unable to determine / Unknown  -- Refer to Clinical Documentation Reviewer    PROVIDER RESPONSE TEXT:    After further study, sepsis was ruled out for this patient.    Query created by: Adrianne Knox on 2/3/2025 4:32 PM      Electronically signed by:  Devan Núñez MD 2/3/2025 5:04 PM

## 2025-02-04 ENCOUNTER — APPOINTMENT (OUTPATIENT)
Facility: HOSPITAL | Age: 80
DRG: 682 | End: 2025-02-04
Attending: INTERNAL MEDICINE
Payer: MEDICARE

## 2025-02-04 VITALS
WEIGHT: 242.7 LBS | HEIGHT: 73 IN | DIASTOLIC BLOOD PRESSURE: 59 MMHG | RESPIRATION RATE: 15 BRPM | HEART RATE: 70 BPM | TEMPERATURE: 97.5 F | BODY MASS INDEX: 32.17 KG/M2 | OXYGEN SATURATION: 96 % | SYSTOLIC BLOOD PRESSURE: 117 MMHG

## 2025-02-04 LAB
ALBUMIN SERPL-MCNC: 2.4 G/DL (ref 3.5–5)
ANION GAP SERPL CALC-SCNC: 8 MMOL/L (ref 2–12)
BASOPHILS # BLD: 0.02 K/UL (ref 0–0.1)
BASOPHILS NFR BLD: 0.3 % (ref 0–1)
BUN SERPL-MCNC: 23 MG/DL (ref 6–20)
BUN/CREAT SERPL: 22 (ref 12–20)
CALCIUM SERPL-MCNC: 8.3 MG/DL (ref 8.5–10.1)
CHLORIDE SERPL-SCNC: 103 MMOL/L (ref 97–108)
CO2 SERPL-SCNC: 23 MMOL/L (ref 21–32)
CREAT SERPL-MCNC: 1.04 MG/DL (ref 0.7–1.3)
DIFFERENTIAL METHOD BLD: ABNORMAL
EOSINOPHIL # BLD: 0.12 K/UL (ref 0–0.4)
EOSINOPHIL NFR BLD: 1.6 % (ref 0–7)
ERYTHROCYTE [DISTWIDTH] IN BLOOD BY AUTOMATED COUNT: 16.1 % (ref 11.5–14.5)
GLUCOSE BLD STRIP.AUTO-MCNC: 266 MG/DL (ref 65–117)
GLUCOSE BLD STRIP.AUTO-MCNC: 281 MG/DL (ref 65–117)
GLUCOSE SERPL-MCNC: 185 MG/DL (ref 65–100)
HCT VFR BLD AUTO: 24.6 % (ref 36.6–50.3)
HGB BLD-MCNC: 8.1 G/DL (ref 12.1–17)
IMM GRANULOCYTES # BLD AUTO: 0.05 K/UL (ref 0–0.04)
IMM GRANULOCYTES NFR BLD AUTO: 0.7 % (ref 0–0.5)
LYMPHOCYTES # BLD: 1.54 K/UL (ref 0.8–3.5)
LYMPHOCYTES NFR BLD: 20.3 % (ref 12–49)
MAGNESIUM SERPL-MCNC: 1.8 MG/DL (ref 1.6–2.4)
MCH RBC QN AUTO: 19 PG (ref 26–34)
MCHC RBC AUTO-ENTMCNC: 32.9 G/DL (ref 30–36.5)
MCV RBC AUTO: 57.7 FL (ref 80–99)
MONOCYTES # BLD: 0.9 K/UL (ref 0–1)
MONOCYTES NFR BLD: 11.8 % (ref 5–13)
NEUTS SEG # BLD: 4.96 K/UL (ref 1.8–8)
NEUTS SEG NFR BLD: 65.3 % (ref 32–75)
NRBC # BLD: 0 K/UL (ref 0–0.01)
NRBC BLD-RTO: 0 PER 100 WBC
PHOSPHATE SERPL-MCNC: 2.4 MG/DL (ref 2.6–4.7)
PLATELET # BLD AUTO: 220 K/UL (ref 150–400)
PMV BLD AUTO: 10 FL (ref 8.9–12.9)
POTASSIUM SERPL-SCNC: 3.5 MMOL/L (ref 3.5–5.1)
RBC # BLD AUTO: 4.26 M/UL (ref 4.1–5.7)
RBC MORPH BLD: ABNORMAL
SERVICE CMNT-IMP: ABNORMAL
SERVICE CMNT-IMP: ABNORMAL
SODIUM SERPL-SCNC: 134 MMOL/L (ref 136–145)
WBC # BLD AUTO: 7.6 K/UL (ref 4.1–11.1)

## 2025-02-04 PROCEDURE — C1751 CATH, INF, PER/CENT/MIDLINE: HCPCS

## 2025-02-04 PROCEDURE — 6370000000 HC RX 637 (ALT 250 FOR IP): Performed by: INTERNAL MEDICINE

## 2025-02-04 PROCEDURE — 85025 COMPLETE CBC W/AUTO DIFF WBC: CPT

## 2025-02-04 PROCEDURE — 83735 ASSAY OF MAGNESIUM: CPT

## 2025-02-04 PROCEDURE — 94761 N-INVAS EAR/PLS OXIMETRY MLT: CPT

## 2025-02-04 PROCEDURE — 2500000003 HC RX 250 WO HCPCS: Performed by: INTERNAL MEDICINE

## 2025-02-04 PROCEDURE — 99232 SBSQ HOSP IP/OBS MODERATE 35: CPT | Performed by: INTERNAL MEDICINE

## 2025-02-04 PROCEDURE — 6360000002 HC RX W HCPCS: Performed by: INTERNAL MEDICINE

## 2025-02-04 PROCEDURE — 2580000003 HC RX 258: Performed by: INTERNAL MEDICINE

## 2025-02-04 PROCEDURE — 80069 RENAL FUNCTION PANEL: CPT

## 2025-02-04 PROCEDURE — 2580000003 HC RX 258: Performed by: FAMILY MEDICINE

## 2025-02-04 PROCEDURE — 82962 GLUCOSE BLOOD TEST: CPT

## 2025-02-04 PROCEDURE — 05HY33Z INSERTION OF INFUSION DEVICE INTO UPPER VEIN, PERCUTANEOUS APPROACH: ICD-10-PCS | Performed by: INTERNAL MEDICINE

## 2025-02-04 RX ORDER — SODIUM CHLORIDE 9 MG/ML
INJECTION, SOLUTION INTRAVENOUS PRN
Status: DISCONTINUED | OUTPATIENT
Start: 2025-02-04 | End: 2025-02-04 | Stop reason: HOSPADM

## 2025-02-04 RX ORDER — SODIUM CHLORIDE 0.9 % (FLUSH) 0.9 %
5-40 SYRINGE (ML) INJECTION PRN
Status: DISCONTINUED | OUTPATIENT
Start: 2025-02-04 | End: 2025-02-04 | Stop reason: HOSPADM

## 2025-02-04 RX ORDER — SODIUM CHLORIDE 0.9 % (FLUSH) 0.9 %
5-40 SYRINGE (ML) INJECTION EVERY 12 HOURS SCHEDULED
Status: DISCONTINUED | OUTPATIENT
Start: 2025-02-04 | End: 2025-02-04 | Stop reason: HOSPADM

## 2025-02-04 RX ADMIN — TAMSULOSIN HYDROCHLORIDE 0.4 MG: 0.4 CAPSULE ORAL at 08:12

## 2025-02-04 RX ADMIN — NAFCILLIN SODIUM 2000 MG: 2 INJECTION, POWDER, LYOPHILIZED, FOR SOLUTION INTRAMUSCULAR; INTRAVENOUS at 06:12

## 2025-02-04 RX ADMIN — SODIUM CHLORIDE: 9 INJECTION, SOLUTION INTRAVENOUS at 06:11

## 2025-02-04 RX ADMIN — NAFCILLIN SODIUM 2000 MG: 2 INJECTION, POWDER, LYOPHILIZED, FOR SOLUTION INTRAMUSCULAR; INTRAVENOUS at 14:30

## 2025-02-04 RX ADMIN — SODIUM CHLORIDE, PRESERVATIVE FREE 10 ML: 5 INJECTION INTRAVENOUS at 08:13

## 2025-02-04 RX ADMIN — HEPARIN SODIUM 5000 UNITS: 5000 INJECTION INTRAVENOUS; SUBCUTANEOUS at 06:11

## 2025-02-04 RX ADMIN — NAFCILLIN SODIUM 2000 MG: 2 INJECTION, POWDER, LYOPHILIZED, FOR SOLUTION INTRAMUSCULAR; INTRAVENOUS at 10:39

## 2025-02-04 RX ADMIN — ASPIRIN 81 MG: 81 TABLET, COATED ORAL at 08:12

## 2025-02-04 RX ADMIN — BUPROPION HYDROCHLORIDE 300 MG: 150 TABLET, EXTENDED RELEASE ORAL at 08:12

## 2025-02-04 RX ADMIN — ESCITALOPRAM OXALATE 20 MG: 10 TABLET ORAL at 08:12

## 2025-02-04 RX ADMIN — FINASTERIDE 5 MG: 5 TABLET, FILM COATED ORAL at 08:12

## 2025-02-04 RX ADMIN — INSULIN LISPRO 4 UNITS: 100 INJECTION, SOLUTION INTRAVENOUS; SUBCUTANEOUS at 12:44

## 2025-02-04 RX ADMIN — NAFCILLIN SODIUM 2000 MG: 2 INJECTION, POWDER, LYOPHILIZED, FOR SOLUTION INTRAMUSCULAR; INTRAVENOUS at 02:22

## 2025-02-04 RX ADMIN — ROSUVASTATIN CALCIUM 10 MG: 10 TABLET, FILM COATED ORAL at 08:12

## 2025-02-04 RX ADMIN — INSULIN LISPRO 4 UNITS: 100 INJECTION, SOLUTION INTRAVENOUS; SUBCUTANEOUS at 08:49

## 2025-02-04 NOTE — PROGRESS NOTES
Bedside and Verbal shift change report given to ANGELICA Miller (oncoming nurse) by ANGELICA Carey (offgoing nurse). Report included the following information Nurse Handoff Report, Index, Adult Overview, Intake/Output, MAR, and Recent Results.       1645 - Discharge instructions reviewed with pt and wife. Patient and wife verbalized understanding regarding home abx infusion.

## 2025-02-04 NOTE — PROGRESS NOTES
[facility] Progress Note  Mir Ramsey DO  [phone] Office  [phone] Fax    2025      Assessment & Plan:     MSSA bacteremia.  Etiology unclear but thought to be due to left second toe.  Repeat blood cultures are sterile to date.  TTE negative for vegetation.  With acute onset low back pain in setting of bacteremia with plan for 6-week course of IV antibiotics at discharge.  Plan discharge on nafcillin 2 g IV every 4 hours administered via continuous infusion pump.  IV antibiotic orders are in the chart.  Okay for discharge tomorrow as long as blood cultures remain sterile .    Left second toe hematoma.  No evidence of osteomyelitis on x-ray.  Outpatient follow-up with podiatry  COVID.  Patient asymptomatic.  Supportive care  TONI.  Creatinine better today.  Avoid nephrotoxic medications  Leukocytosis.  Due to above.  Resolved  Thrombocytopenia.  Likely due to infection.  Resolved  Low back pain.  MRI lumbar spine negative for discitis   Diabetes mellitus.  This is poorly controlled.  Hemoglobin A1c is 8.4  Depression.  Patient on Lexapro and Wellbutrin          Subjective:     No new complaints    Objective:     Vitals: BP (!) 117/59   Pulse 70   Temp 97.5 °F (36.4 °C) (Oral)   Resp 15   Ht 1.854 m (6' 1\")   Wt 110.1 kg (242 lb 11.2 oz)   SpO2 96%   BMI 32.02 kg/m²      Tmax:  Temp (24hrs), Av.5 °F (36.9 °C), Min:97.5 °F (36.4 °C), Max:99.7 °F (37.6 °C)      Exam:   Patient is intubated:  no    Physical Examination:             General:  Alert, cooperative, no distress   Head:  Normocephalic, atraumatic.   Eyes:  Conjunctivae clear   Neck: Supple       Lungs:   No distress.     Chest wall:     Heart:     Abdomen:   non-distended   Extremities: Moves all.    Skin: No acute rash on exposed skin   Neurologic: CNII-XII intact. Normal strength     Labs:        Invalid input(s): \"ITNL\"   No results for input(s): \"CPK\", \"CKMB\" in the last 72 hours.    Invalid input(s):

## 2025-02-04 NOTE — PROGRESS NOTES
PICC Placement: yes   Note    PRE-PROCEDURE VERIFICATION  Correct Procedure: yes  Correct Site:  yes  Temperature: Temp: 97.5 °F (36.4 °C)      Recent Labs     02/04/25  0154   BUN 23*      WBC 7.6      Allergies: Patient has no known allergies.   Education materials for PICC Care given: yes. See Patient Education activity for further details.  PICC Booklet placed at bedside: yes    Closed Ended PICC Catheters:  Flush Lumens as Follows:  Intermittent Medication:   Flush before and after each medication with 10 ml NS.   Unused Ports:  Flush every 8 hours with 10 ml NS.  TPN Ports:  Flush every 24 hours with 20 ml NS prior to hanging new bag.  Blood Draws: Stop infusion, draw off and waste 10 ml of blood. Draw sample with 10cc syringe or greater. DO NOT USE VACUTAINER . Transfer with appropriate device to lab  tubes. Flush with 20 ml NS.  Dressing Change:  Every 7 days, and PRN using sterile technique if integrity of dressing is compromised.  Initial dressing change for central line 24-48 hours post insertion if gauze is used. Apply new dressing per policy.    PROCEDURE DETAIL  Consent was obtained and all questions were answered related to risks and benefits.   A single lumen PICC line was inserted, as a sterile procedure using ultrasound and modified Seldinger technique for antibiotic therapy. The following documentation is in addition to the PICC properties in the lines/airways flowsheet :  Lot #: [id number]  Lidocaine 1% administered intradermally :yes  Internal Catheter Total Length: 40 (cm)  Vein Selection for PICC: right basilic  Central Line Bundle followed yes  Complication Related to Insertion:no    The placement was verified by EKG, MAX P WAVE @ 40 (cm) with zero (cm) out PER EKG PICC TIP @ C/A junction.      X-ray: not required with Max P placement  Line is okay to use: yes    MORGAN WEI RN

## 2025-02-04 NOTE — CARE COORDINATION
Care Management Discharge Note:      02/04/25 1235   Discharge Planning   Patient expects to be discharged to: House   Services At/After Discharge   Transition of Care Consult (CM Consult) Home Health;Infusion Center  ([facility])   Services At/After Discharge Home Health;IV Therapy;OT;PT;Nursing services   Mode of Transport at Discharge Other (see comment)  (spouse)   Confirm Follow Up Transport Self     Patient will dc home today after PICC placed and teaching for Home IV ABX completed. ETA for teaching with [facility] is 2:30, CM updated patient at bedside. Patient will have home IV ABX through [facility] and  with [facility]. AVS updated to reflect. Spouse will provide transport at time of dc.   ______________________  Carina CARRANZA, RN  Care Management  2/4/2025

## 2025-02-04 NOTE — DISCHARGE INSTRUCTIONS
HOSPITALIST DISCHARGE INSTRUCTIONS  NAME:  Josh Almodovar   :  1945   MRN:  [id number]     Date/Time:  2025 10:59 AM    ADMIT DATE: 2025     DISCHARGE DATE: 2025     DISCHARGE DIAGNOSIS:  Bacteremia    DISCHARGE INSTRUCTIONS:  Thank you for allowing us to participate in your care. Your discharging Hospitalist is Devan Núñez MD. You were admitted for evaluation and treatment of the above.        MEDICATIONS:    It is important that you take the medication exactly as they are prescribed.   Keep your medication in the bottles provided by the pharmacist and keep a list of the medication names, dosages, and times to be taken in your wallet.   Do not take other medications without consulting your doctor.             If you experience any of the following symptoms then please call your primary care physician or return to the emergency room if you cannot get hold of your doctor:  Fever, chills, nausea, vomiting, diarrhea, change in mentation, falling, bleeding, shortness of breath    Follow Up:  Please call the below provider to arrange hospital follow up appointment      No follow-up provider specified.    For questions regarding your Hospitalization or to contact the Hospital Medicine team, please call [phone].      Information obtained by :  I understand that if any problems occur once I am at home I am to contact my physician.    I understand and acknowledge receipt of the instructions indicated above.                                                                                                                                           Physician's or R.N.'s Signature                                                                  Date/Time                                                                                                                                              Patient or Representative Signature                                                          Date/Time

## 2025-02-04 NOTE — DISCHARGE SUMMARY
Hospitalist Discharge Summary     Patient ID:    Josh Almodovar  [id number]  79 y.o.  1945    Admit date of service: 1/30/2025    Discharge date of service: 2/4/2025    Admission Diagnoses: Metabolic acidosis [E87.20]  Hyperglycemia [R73.9]  SIRS (systemic inflammatory response syndrome) ([facility]) [R65.10]  TONI (acute kidney injury) ([facility]) [N17.9]  Sepsis, due to unspecified organism, unspecified whether acute organ dysfunction present ([facility]) [A41.9]  COVID-19 virus infection [U07.1]  COVID-19 [U07.1]    Chronic Diagnoses:      Discharge Medications:   Current Discharge Medication List        CONTINUE these medications which have NOT CHANGED    Details   carvedilol (COREG) 6.25 MG tablet TAKE 1 TABLET TWICE A DAY  Qty: 180 tablet, Refills: 3      lisinopril (PRINIVIL;ZESTRIL) 40 MG tablet TAKE 1 TABLET DAILY  Qty: 90 tablet, Refills: 3      finasteride (PROSCAR) 5 MG tablet TAKE 1 TABLET DAILY  Qty: 90 tablet, Refills: 1      escitalopram (LEXAPRO) 20 MG tablet TAKE 1 TABLET DAILY  Qty: 90 tablet, Refills: 1      rosuvastatin (CRESTOR) 10 MG tablet TAKE 1 TABLET DAILY  Qty: 90 tablet, Refills: 1      glipiZIDE (GLUCOTROL) 5 MG tablet TAKE 1 TABLET DAILY  Qty: 90 tablet, Refills: 3      buPROPion (WELLBUTRIN XL) 300 MG extended release tablet Take 1 tablet by mouth every morning  Qty: 90 tablet, Refills: 2      JARDIANCE 10 MG tablet TAKE 1 TABLET DAILY  Qty: 90 tablet, Refills: 3      Multiple Vitamins-Minerals (CENTRUM SILVER PO) Take by mouth Daily      chlorthalidone (HYGROTON) 25 MG tablet       acetaminophen (TYLENOL) 500 MG tablet Take 2 tablets by mouth as needed      aspirin 81 MG EC tablet Take 1 tablet by mouth daily           STOP taking these medications       tamsulosin (FLOMAX) 0.4 MG capsule Comments:   Reason for Stopping:         amLODIPine (NORVASC) 10 MG tablet Comments:   Reason for Stopping:         potassium chloride (KLOR-CON M) 10 MEQ extended release tablet Comments:   Reason for Stopping:   Bacteremia: Positive in 2/2 bottles. Repeat BCx negative so far.  Evaluated by ID.  On nafcillin 2 g IV every 4 hours through 3/14/2025.  DC daptomycin as MRSA of nasal swab is negative.  Echo is negative for vegetation.  WBC improving.       2.  SIRS: Meets criteria with fever, tachycardia and leukocytosis but no obvious source.  Lactic acid within normal limits.  Blood cultures as above. Chest x-ray with no signs of pneumonia. Continue nafcillin     3.  Lumbar Pain: No neuro deficits. Had GLF prior to arrival to ER.  Intermittent episodes in the past. MRI of L-spine: No evidence of discitis.  Severe right L4-L5 neural foraminal stenosis.  Outpatient follow-up with Ortho     4.  TONI/flank pain: Resolved.  Suspect due to intravascular volume depletion due to diarrhea nausea vomiting exasperated by home lisinopril and chlorthalidone.  Renal US 1/30/25 unremarkable. UA unremarkable.  Avoid nephrotoxins.      5.  Anemia: Iron deficient. Monitor, trend H/H     6.  Fall: States happened prior to admission this morning. CT head and CT cervical spine negative. Consult PT      7.  Coronary artery disease S/P CABG x 1/ HTN : Denies any acute chest pain.  Continue home medication.     8.  History of CVA: No obvious defects.  Continue aspirin     9.  BPH: continue finasteride and flomax      10.  DM (diabetes mellitus), type 2 with complications (HCC): A1c is 8.4%.  Continue home medication.    11.  Major depressive disorder, recurrent, moderate: continue lexapro and Wellbutrin      12.  Hypokalemia/hypophosphatemia.  Replete        Discharged in improved condition.    Spent 35 minutes    Signed:  Devan Núñez MD  2/4/2025  10:56 AM

## 2025-02-06 LAB
BACTERIA SPEC CULT: ABNORMAL
BACTERIA SPEC CULT: ABNORMAL
SERVICE CMNT-IMP: ABNORMAL

## 2025-02-07 LAB
BACTERIA SPEC CULT: NORMAL
BACTERIA SPEC CULT: NORMAL
SERVICE CMNT-IMP: NORMAL
SERVICE CMNT-IMP: NORMAL

## 2025-02-09 ENCOUNTER — TELEPHONE (OUTPATIENT)
Age: 80
End: 2025-02-09

## 2025-02-13 ENCOUNTER — OFFICE VISIT (OUTPATIENT)
Age: 80
End: 2025-02-13
Payer: MEDICARE

## 2025-02-13 ENCOUNTER — TELEPHONE (OUTPATIENT)
Facility: CLINIC | Age: 80
End: 2025-02-13

## 2025-02-13 VITALS
DIASTOLIC BLOOD PRESSURE: 81 MMHG | OXYGEN SATURATION: 99 % | TEMPERATURE: 98.1 F | RESPIRATION RATE: 18 BRPM | HEART RATE: 63 BPM | SYSTOLIC BLOOD PRESSURE: 177 MMHG | BODY MASS INDEX: 31.34 KG/M2 | HEIGHT: 72 IN | WEIGHT: 231.4 LBS

## 2025-02-13 DIAGNOSIS — R78.81 BACTEREMIA DUE TO METHICILLIN SUSCEPTIBLE STAPHYLOCOCCUS AUREUS (MSSA): Primary | ICD-10-CM

## 2025-02-13 DIAGNOSIS — B95.61 BACTEREMIA DUE TO METHICILLIN SUSCEPTIBLE STAPHYLOCOCCUS AUREUS (MSSA): Primary | ICD-10-CM

## 2025-02-13 DIAGNOSIS — M48.061 SPINAL STENOSIS OF LUMBAR REGION, UNSPECIFIED WHETHER NEUROGENIC CLAUDICATION PRESENT: Primary | ICD-10-CM

## 2025-02-13 DIAGNOSIS — M54.41 ACUTE RIGHT-SIDED LOW BACK PAIN WITH RIGHT-SIDED SCIATICA: ICD-10-CM

## 2025-02-13 PROCEDURE — G8427 DOCREV CUR MEDS BY ELIG CLIN: HCPCS | Performed by: NURSE PRACTITIONER

## 2025-02-13 PROCEDURE — 1036F TOBACCO NON-USER: CPT | Performed by: NURSE PRACTITIONER

## 2025-02-13 PROCEDURE — 1123F ACP DISCUSS/DSCN MKR DOCD: CPT | Performed by: NURSE PRACTITIONER

## 2025-02-13 PROCEDURE — 1160F RVW MEDS BY RX/DR IN RCRD: CPT | Performed by: NURSE PRACTITIONER

## 2025-02-13 PROCEDURE — 1111F DSCHRG MED/CURRENT MED MERGE: CPT | Performed by: NURSE PRACTITIONER

## 2025-02-13 PROCEDURE — 1125F AMNT PAIN NOTED PAIN PRSNT: CPT | Performed by: NURSE PRACTITIONER

## 2025-02-13 PROCEDURE — 99213 OFFICE O/P EST LOW 20 MIN: CPT | Performed by: NURSE PRACTITIONER

## 2025-02-13 PROCEDURE — 3077F SYST BP >= 140 MM HG: CPT | Performed by: NURSE PRACTITIONER

## 2025-02-13 PROCEDURE — 3079F DIAST BP 80-89 MM HG: CPT | Performed by: NURSE PRACTITIONER

## 2025-02-13 PROCEDURE — G8417 CALC BMI ABV UP PARAM F/U: HCPCS | Performed by: NURSE PRACTITIONER

## 2025-02-13 PROCEDURE — 1159F MED LIST DOCD IN RCRD: CPT | Performed by: NURSE PRACTITIONER

## 2025-02-13 RX ORDER — GABAPENTIN 300 MG/1
300 CAPSULE ORAL NIGHTLY
Qty: 30 CAPSULE | Refills: 0 | Status: SHIPPED | OUTPATIENT
Start: 2025-02-13 | End: 2025-03-15

## 2025-02-13 RX ORDER — NAFCILLIN SODIUM 10 G/100ML
INJECTION, POWDER, FOR SOLUTION INTRAVENOUS
COMMUNITY
Start: 2025-02-06

## 2025-02-13 ASSESSMENT — PATIENT HEALTH QUESTIONNAIRE - PHQ9: DEPRESSION UNABLE TO ASSESS: PT REFUSES

## 2025-02-13 ASSESSMENT — ENCOUNTER SYMPTOMS: BACK PAIN: 1

## 2025-02-13 NOTE — PROGRESS NOTES
Identified pt with two pt identifiers(name and ). Reviewed record in preparation for visit and have obtained necessary documentation. All patient medications has been reviewed.  Chief Complaint   Patient presents with    Back Pain     Patient states he has lower back pain started 3 weeks ago no known injury.           Wt Readings from Last 3 Encounters:   25 105 kg (231 lb 6.4 oz)   25 110.1 kg (242 lb 11.2 oz)   24 100.5 kg (221 lb 9.6 oz)     Temp Readings from Last 3 Encounters:   25 98.1 °F (36.7 °C) (Oral)   25 97.5 °F (36.4 °C) (Oral)   24 98.1 °F (36.7 °C)     BP Readings from Last 3 Encounters:   25 (!) 177/81   25 (!) 117/59   24 110/73     Pulse Readings from Last 3 Encounters:   25 63   25 70   24 64       \"Have you been to the ER, urgent care clinic since your last visit?  Hospitalized since your last visit?\"    YES - When: approximately 2025 ago.  Where and Why: [facility] .    “Have you seen or consulted any other health care providers outside of [facility] since your last visit?”    NO    Click Here for Release of Records Request      while they are present in the room.

## 2025-02-13 NOTE — PROGRESS NOTES
Josh Almodovar (:  1945) is a 79 y.o. male,here for evaluation of the following chief complaint(s):  Back Pain (Patient states he has lower back pain started 3 weeks ago no known injury.)    BP (!) 177/81 (Site: Left Upper Arm, Position: Sitting, Cuff Size: Small Adult)   Pulse 63   Temp 98.1 °F (36.7 °C) (Oral)   Resp 18   Ht 1.83 m (6' 0.05\")   Wt 105 kg (231 lb 6.4 oz)   SpO2 99%   BMI 31.34 kg/m²       SUBJECTIVE/OBJECTIVE:    HPI:Patient reports severe low back pain x 3 weeks. He was recently hospitalized with sepsis. He had recent MRI that showed lumbar stenosis and bulging discs. Patient reports he did fall out of bed just before his 25 admission, but pain in low back was present prior to that. Pain sometimes radiates down right leg with some weakness. He is now using a walker due to this pain. Patient is taking aleve with no relief. He is not able to sleep due to pain.    Review of Systems   Musculoskeletal:  Positive for back pain.       Physical Exam  Musculoskeletal:      Comments: Right lumbar paraspinal tenderness and SI joint pain   Neurological:      General: No focal deficit present.      Mental Status: He is oriented to person, place, and time.      Motor: Weakness present.   Psychiatric:         Mood and Affect: Mood normal.         Behavior: Behavior normal.          ASSESSMENT/PLAN:  1. Spinal stenosis of lumbar region, unspecified whether neurogenic claudication present  -     [facility] MD, Orthopedic Surgery (back, neck, spine), [place] ([address])  -     Gavino Dorsey MD, Pain Medicine, Nirav ([address])  -     gabapentin (NEURONTIN) 300 MG capsule; Take 1 capsule by mouth nightly for 30 days. Max Daily Amount: 300 mg, Disp-30 capsule, R-0Normal  2. Acute right-sided low back pain with right-sided sciatica  -     [facility] MD, Orthopedic Surgery (back, neck, spine), Nirav ([address])  -     Gavino Dorsey MD, Pain

## 2025-02-13 NOTE — TELEPHONE ENCOUNTER
[facility] called  office.     Advised they do not draw paired blood cultures.     Pt will need to go to ER to have this drawn. Nurse did advise pt of this and he is agreeable.

## 2025-02-14 ENCOUNTER — HOSPITAL ENCOUNTER (OUTPATIENT)
Facility: HOSPITAL | Age: 80
Discharge: HOME | End: 2025-02-17

## 2025-02-14 DIAGNOSIS — B95.61 BACTEREMIA DUE TO METHICILLIN SUSCEPTIBLE STAPHYLOCOCCUS AUREUS (MSSA): ICD-10-CM

## 2025-02-14 DIAGNOSIS — R78.81 BACTEREMIA DUE TO METHICILLIN SUSCEPTIBLE STAPHYLOCOCCUS AUREUS (MSSA): ICD-10-CM

## 2025-02-17 ENCOUNTER — OFFICE VISIT (OUTPATIENT)
Age: 80
End: 2025-02-17

## 2025-02-17 VITALS
OXYGEN SATURATION: 100 % | HEART RATE: 64 BPM | TEMPERATURE: 98.1 F | DIASTOLIC BLOOD PRESSURE: 74 MMHG | WEIGHT: 231 LBS | HEIGHT: 73 IN | SYSTOLIC BLOOD PRESSURE: 140 MMHG | RESPIRATION RATE: 15 BRPM | BODY MASS INDEX: 30.62 KG/M2

## 2025-02-17 DIAGNOSIS — R65.21 SEPSIS DUE TO METHICILLIN SUSCEPTIBLE STAPHYLOCOCCUS AUREUS (MSSA) WITH ACUTE RENAL FAILURE AND SEPTIC SHOCK, UNSPECIFIED ACUTE RENAL FAILURE TYPE (HCC): ICD-10-CM

## 2025-02-17 DIAGNOSIS — A41.01 SEPSIS DUE TO METHICILLIN SUSCEPTIBLE STAPHYLOCOCCUS AUREUS (MSSA) WITH ACUTE RENAL FAILURE AND SEPTIC SHOCK, UNSPECIFIED ACUTE RENAL FAILURE TYPE (HCC): ICD-10-CM

## 2025-02-17 DIAGNOSIS — I25.10 CORONARY ARTERY DISEASE INVOLVING NATIVE CORONARY ARTERY OF NATIVE HEART WITHOUT ANGINA PECTORIS: ICD-10-CM

## 2025-02-17 DIAGNOSIS — E11.8 TYPE 2 DIABETES MELLITUS WITH UNSPECIFIED COMPLICATIONS (HCC): Primary | ICD-10-CM

## 2025-02-17 DIAGNOSIS — N17.9 SEPSIS DUE TO METHICILLIN SUSCEPTIBLE STAPHYLOCOCCUS AUREUS (MSSA) WITH ACUTE RENAL FAILURE AND SEPTIC SHOCK, UNSPECIFIED ACUTE RENAL FAILURE TYPE (HCC): ICD-10-CM

## 2025-02-17 DIAGNOSIS — Z09 HOSPITAL DISCHARGE FOLLOW-UP: ICD-10-CM

## 2025-02-17 DIAGNOSIS — I50.22 CHRONIC SYSTOLIC (CONGESTIVE) HEART FAILURE (HCC): ICD-10-CM

## 2025-02-17 RX ORDER — HYDROCHLOROTHIAZIDE 12.5 MG/1
CAPSULE ORAL
Qty: 1 EACH | Refills: 0 | Status: SHIPPED | COMMUNITY
Start: 2025-02-17

## 2025-02-17 RX ORDER — CARVEDILOL 6.25 MG/1
3.12 TABLET ORAL 2 TIMES DAILY
COMMUNITY
Start: 2025-02-17

## 2025-02-17 RX ORDER — FERROUS SULFATE 325(65) MG
325 TABLET ORAL
COMMUNITY
Start: 2025-02-17

## 2025-02-17 SDOH — ECONOMIC STABILITY: FOOD INSECURITY: WITHIN THE PAST 12 MONTHS, YOU WORRIED THAT YOUR FOOD WOULD RUN OUT BEFORE YOU GOT MONEY TO BUY MORE.: NEVER TRUE

## 2025-02-17 SDOH — ECONOMIC STABILITY: FOOD INSECURITY: WITHIN THE PAST 12 MONTHS, THE FOOD YOU BOUGHT JUST DIDN'T LAST AND YOU DIDN'T HAVE MONEY TO GET MORE.: NEVER TRUE

## 2025-02-17 ASSESSMENT — PATIENT HEALTH QUESTIONNAIRE - PHQ9
8. MOVING OR SPEAKING SO SLOWLY THAT OTHER PEOPLE COULD HAVE NOTICED. OR THE OPPOSITE, BEING SO FIGETY OR RESTLESS THAT YOU HAVE BEEN MOVING AROUND A LOT MORE THAN USUAL: NOT AT ALL
6. FEELING BAD ABOUT YOURSELF - OR THAT YOU ARE A FAILURE OR HAVE LET YOURSELF OR YOUR FAMILY DOWN: NOT AT ALL
2. FEELING DOWN, DEPRESSED OR HOPELESS: NOT AT ALL
7. TROUBLE CONCENTRATING ON THINGS, SUCH AS READING THE NEWSPAPER OR WATCHING TELEVISION: SEVERAL DAYS
SUM OF ALL RESPONSES TO PHQ QUESTIONS 1-9: 6
SUM OF ALL RESPONSES TO PHQ QUESTIONS 1-9: 6
3. TROUBLE FALLING OR STAYING ASLEEP: SEVERAL DAYS
SUM OF ALL RESPONSES TO PHQ QUESTIONS 1-9: 6
1. LITTLE INTEREST OR PLEASURE IN DOING THINGS: MORE THAN HALF THE DAYS
SUM OF ALL RESPONSES TO PHQ QUESTIONS 1-9: 6
5. POOR APPETITE OR OVEREATING: NOT AT ALL
4. FEELING TIRED OR HAVING LITTLE ENERGY: MORE THAN HALF THE DAYS
9. THOUGHTS THAT YOU WOULD BE BETTER OFF DEAD, OR OF HURTING YOURSELF: NOT AT ALL
SUM OF ALL RESPONSES TO PHQ9 QUESTIONS 1 & 2: 2
10. IF YOU CHECKED OFF ANY PROBLEMS, HOW DIFFICULT HAVE THESE PROBLEMS MADE IT FOR YOU TO DO YOUR WORK, TAKE CARE OF THINGS AT HOME, OR GET ALONG WITH OTHER PEOPLE: NOT DIFFICULT AT ALL

## 2025-02-17 NOTE — PROGRESS NOTES
Post-Discharge Transitional Care  Follow Up      Josh Almodovar   YOB: 1945    Date of Office Visit:  2/17/2025  Date of Hospital Admission: 1/30/25  Date of Hospital Discharge: 2/4/25  Risk of hospital readmission (high >=14%. Medium >=10%) :Readmission Risk Score: 17.3      Care management risk score Rising risk (score 2-5) and Complex Care (Scores >=6): No Risk Score On File     Non face to face  following discharge, date last encounter closed (first attempt may have been earlier): 02/05/2025    Call initiated 2 business days of discharge: Yes    ASSESSMENT/PLAN:   Type 2 diabetes mellitus with unspecified complications (HCC)-not well-controlled based on recent labs.  Provided him with the freestyle naty today to try for the next 2 weeks.  May very well need to increase his Jardiance and/or his glipizide for better control.  Coronary artery disease involving native coronary artery of native heart without angina pectoris-appears stable.  Cardiology follow-up.  Sepsis due to methicillin susceptible Staphylococcus aureus (MSSA) with acute renal failure and septic shock, unspecified acute renal failure type (HCC)-unclear etiology but may be from his toe infection.  Will follow-up with podiatry regarding that.  Will continue antibiotics for 6 weeks and see ID for follow-up as well.  Chronic systolic (congestive) heart failure (HCC)-stable.  Hospital discharge follow-up  -     CO DISCHARGE MEDS RECONCILED W/ CURRENT OUTPATIENT MED LIST  Lumbar radiculopathy-much improved on current gabapentin.  Will continue that dosing for now.    Medical Decision Making: moderate complexity  Return in 1 month (on 3/17/2025).           Subjective:   HPI:  Follow up of Hospital problems/diagnosis(es): Sepsis with renal failure and hypotension.    Inpatient course: Discharge summary reviewed- see chart.    Interval history/Current status: Has improved since he has been home.  Initially was so fatigued he was unable

## 2025-02-24 RX ORDER — EMPAGLIFLOZIN 10 MG/1
10 TABLET, FILM COATED ORAL DAILY
Qty: 90 TABLET | Refills: 3 | Status: SHIPPED | OUTPATIENT
Start: 2025-02-24

## 2025-03-13 ENCOUNTER — OFFICE VISIT (OUTPATIENT)
Facility: CLINIC | Age: 80
End: 2025-03-13
Payer: MEDICARE

## 2025-03-13 DIAGNOSIS — R78.81 BACTEREMIA: Primary | ICD-10-CM

## 2025-03-13 PROCEDURE — 99213 OFFICE O/P EST LOW 20 MIN: CPT | Performed by: INTERNAL MEDICINE

## 2025-03-13 PROCEDURE — 1159F MED LIST DOCD IN RCRD: CPT | Performed by: INTERNAL MEDICINE

## 2025-03-13 PROCEDURE — G8417 CALC BMI ABV UP PARAM F/U: HCPCS | Performed by: INTERNAL MEDICINE

## 2025-03-13 PROCEDURE — 1036F TOBACCO NON-USER: CPT | Performed by: INTERNAL MEDICINE

## 2025-03-13 PROCEDURE — G8427 DOCREV CUR MEDS BY ELIG CLIN: HCPCS | Performed by: INTERNAL MEDICINE

## 2025-03-13 PROCEDURE — 1123F ACP DISCUSS/DSCN MKR DOCD: CPT | Performed by: INTERNAL MEDICINE

## 2025-03-13 ASSESSMENT — PATIENT HEALTH QUESTIONNAIRE - PHQ9
SUM OF ALL RESPONSES TO PHQ QUESTIONS 1-9: 1
2. FEELING DOWN, DEPRESSED OR HOPELESS: SEVERAL DAYS
SUM OF ALL RESPONSES TO PHQ QUESTIONS 1-9: 1
SUM OF ALL RESPONSES TO PHQ QUESTIONS 1-9: 1
1. LITTLE INTEREST OR PLEASURE IN DOING THINGS: NOT AT ALL
SUM OF ALL RESPONSES TO PHQ QUESTIONS 1-9: 1

## 2025-03-13 NOTE — PROGRESS NOTES
Chief Complaint   Patient presents with    Follow-up     1. Have you been to the ER, urgent care clinic since your last visit?  Hospitalized since your last visit?No    2. Have you seen or consulted any other health care providers outside of the [facility] since your last visit?  Include any pap smears or colon screening.  Yes [facility].

## 2025-03-13 NOTE — PROGRESS NOTES
[facility] Progress Note  Mir Ramsey DO  [phone] Office  [phone] Fax    3/13/2025      Assessment & Plan:     MSSA bacteremia.  Etiology unclear but thought to be due to left second toe.  Repeat blood cultures are sterile to date.  TTE negative for vegetation.  With acute onset low back pain in setting of bacteremia patient treated with a 6-week course of IV antibiotics.  Nafcillin to be completed 3/14/2025.   Low back pain.  MRI lumbar spine negative for discitis.  Seen by orthospine 3/7/2025.  X-rays not concerning for infection.  Patient referred to physical therapy  Diabetes mellitus.  This is poorly controlled.  Hemoglobin A1c is 8.4  Depression.  Patient on Lexapro and Wellbutrin          Subjective:     Tolerating antibiotics    Objective:     Physical Examination:   General:  Alert, cooperative, no distress   Head:  Normocephalic, atraumatic.   Eyes:  Conjunctivae clear   Neck: Supple       Lungs:   No distress.    Chest wall:     Heart:     Abdomen:   non-distended   Extremities: Moves all.     Skin: No acute rash on exposed skin   Neurologic: No focal deficit     Labs:        Invalid input(s): \"ITNL\"   No results for input(s): \"CPK\", \"CKMB\" in the last 72 hours.    Invalid input(s): \"TROIQ\"  No results for input(s): \"NA\", \"K\", \"CL\", \"CO2\", \"BUN\", \"GLU\", \"PHOS\", \"MG\", \"WBC\", \"HGB\", \"HCT\", \"PLT\" in the last 72 hours.    Invalid input(s): \"CREA\", \"CA\", \"ALB\"  No results for input(s): \"INR\", \"APTT\" in the last 72 hours.    Invalid input(s): \"PTP\"  Needs: urine analysis, urine sodium, protein and creatinine  No results found for: \"KU\"      Cultures:     Lab Results   Component Value Date/Time    SDES URINE 11/04/2013 11:07 AM     No components found for: \"CULT\"    Radiology:     Medications       [unfilled]        Case discussed with:    A total time of 35 minutes was spent on today's encounter.  Greater than 50% of the time was spent on the following:  Preparing for

## 2025-03-20 RX ORDER — ROSUVASTATIN CALCIUM 10 MG/1
10 TABLET, COATED ORAL DAILY
Qty: 90 TABLET | Refills: 3 | Status: SHIPPED | OUTPATIENT
Start: 2025-03-20

## 2025-03-26 ENCOUNTER — OFFICE VISIT (OUTPATIENT)
Age: 80
End: 2025-03-26
Payer: MEDICARE

## 2025-03-26 VITALS
RESPIRATION RATE: 15 BRPM | BODY MASS INDEX: 28.57 KG/M2 | HEART RATE: 77 BPM | HEIGHT: 73 IN | OXYGEN SATURATION: 99 % | DIASTOLIC BLOOD PRESSURE: 60 MMHG | TEMPERATURE: 98.1 F | SYSTOLIC BLOOD PRESSURE: 94 MMHG | WEIGHT: 215.6 LBS

## 2025-03-26 DIAGNOSIS — E11.8 TYPE 2 DIABETES MELLITUS WITH UNSPECIFIED COMPLICATIONS: ICD-10-CM

## 2025-03-26 DIAGNOSIS — I25.10 CORONARY ARTERY DISEASE INVOLVING NATIVE CORONARY ARTERY OF NATIVE HEART WITHOUT ANGINA PECTORIS: ICD-10-CM

## 2025-03-26 DIAGNOSIS — I50.22 CHRONIC SYSTOLIC (CONGESTIVE) HEART FAILURE: ICD-10-CM

## 2025-03-26 DIAGNOSIS — N17.9 SEPSIS DUE TO METHICILLIN SUSCEPTIBLE STAPHYLOCOCCUS AUREUS (MSSA) WITH ACUTE RENAL FAILURE AND SEPTIC SHOCK, UNSPECIFIED ACUTE RENAL FAILURE TYPE (HCC): ICD-10-CM

## 2025-03-26 DIAGNOSIS — R65.21 SEPSIS DUE TO METHICILLIN SUSCEPTIBLE STAPHYLOCOCCUS AUREUS (MSSA) WITH ACUTE RENAL FAILURE AND SEPTIC SHOCK, UNSPECIFIED ACUTE RENAL FAILURE TYPE (HCC): ICD-10-CM

## 2025-03-26 DIAGNOSIS — A41.01 SEPSIS DUE TO METHICILLIN SUSCEPTIBLE STAPHYLOCOCCUS AUREUS (MSSA) WITH ACUTE RENAL FAILURE AND SEPTIC SHOCK, UNSPECIFIED ACUTE RENAL FAILURE TYPE (HCC): ICD-10-CM

## 2025-03-26 DIAGNOSIS — R39.9 UTI SYMPTOMS: Primary | ICD-10-CM

## 2025-03-26 LAB
BILIRUBIN, URINE, POC: NEGATIVE
BLOOD URINE, POC: NORMAL
GLUCOSE URINE, POC: NORMAL
KETONES, URINE, POC: NEGATIVE
LEUKOCYTE ESTERASE, URINE, POC: NORMAL
NITRITE, URINE, POC: NEGATIVE
PH, URINE, POC: 5.5 (ref 4.6–8)
PROTEIN,URINE, POC: NORMAL
SPECIFIC GRAVITY, URINE, POC: 1.01 (ref 1–1.03)
URINALYSIS CLARITY, POC: NORMAL
URINALYSIS COLOR, POC: YELLOW
UROBILINOGEN, POC: NORMAL

## 2025-03-26 PROCEDURE — PBSHW AMB POC URINALYSIS DIP STICK AUTO W/O MICRO: Performed by: INTERNAL MEDICINE

## 2025-03-26 PROCEDURE — 1036F TOBACCO NON-USER: CPT | Performed by: INTERNAL MEDICINE

## 2025-03-26 PROCEDURE — 1126F AMNT PAIN NOTED NONE PRSNT: CPT | Performed by: INTERNAL MEDICINE

## 2025-03-26 PROCEDURE — 99213 OFFICE O/P EST LOW 20 MIN: CPT | Performed by: INTERNAL MEDICINE

## 2025-03-26 PROCEDURE — G8417 CALC BMI ABV UP PARAM F/U: HCPCS | Performed by: INTERNAL MEDICINE

## 2025-03-26 PROCEDURE — 1123F ACP DISCUSS/DSCN MKR DOCD: CPT | Performed by: INTERNAL MEDICINE

## 2025-03-26 PROCEDURE — 3074F SYST BP LT 130 MM HG: CPT | Performed by: INTERNAL MEDICINE

## 2025-03-26 PROCEDURE — 3078F DIAST BP <80 MM HG: CPT | Performed by: INTERNAL MEDICINE

## 2025-03-26 PROCEDURE — 81003 URINALYSIS AUTO W/O SCOPE: CPT | Performed by: INTERNAL MEDICINE

## 2025-03-26 PROCEDURE — 3052F HG A1C>EQUAL 8.0%<EQUAL 9.0%: CPT | Performed by: INTERNAL MEDICINE

## 2025-03-26 PROCEDURE — G8427 DOCREV CUR MEDS BY ELIG CLIN: HCPCS | Performed by: INTERNAL MEDICINE

## 2025-03-26 PROCEDURE — 1159F MED LIST DOCD IN RCRD: CPT | Performed by: INTERNAL MEDICINE

## 2025-03-26 RX ORDER — SULFAMETHOXAZOLE AND TRIMETHOPRIM 800; 160 MG/1; MG/1
1 TABLET ORAL 2 TIMES DAILY
Qty: 14 TABLET | Refills: 0 | Status: ON HOLD | OUTPATIENT
Start: 2025-03-26 | End: 2025-04-02

## 2025-03-26 RX ORDER — CYCLOBENZAPRINE HCL 5 MG
5 TABLET ORAL 3 TIMES DAILY PRN
Status: ON HOLD | COMMUNITY

## 2025-03-26 RX ORDER — TAMSULOSIN HYDROCHLORIDE 0.4 MG/1
0.4 CAPSULE ORAL DAILY
Status: ON HOLD | COMMUNITY
Start: 2025-03-26

## 2025-03-26 NOTE — PROGRESS NOTES
education: Not on file    Highest education level: Not on file   Occupational History    Not on file   Tobacco Use    Smoking status: Former     Current packs/day: 0.00     Average packs/day: 2.0 packs/day for 15.0 years (30.0 ttl pk-yrs)     Types: Cigarettes, Pipe, Cigars     Start date: 1966     Quit date: 1981     Years since quittin.4    Smokeless tobacco: Never   Substance and Sexual Activity    Alcohol use: Yes     Comment: Extremely moderate    Drug use: No    Sexual activity: Not on file   Other Topics Concern    Not on file   Social History Narrative    Not on file     Social Drivers of Health     Financial Resource Strain: Low Risk  (2024)    Overall Financial Resource Strain (CARDIA)     Difficulty of Paying Living Expenses: Not hard at all   Food Insecurity: No Food Insecurity (2025)    Hunger Vital Sign     Worried About Running Out of Food in the Last Year: Never true     Ran Out of Food in the Last Year: Never true   Transportation Needs: No Transportation Needs (2025)    PRAPARE - Transportation     Lack of Transportation (Medical): No     Lack of Transportation (Non-Medical): No   Physical Activity: Not on file   Stress: Not on file   Social Connections: Not on file   Intimate Partner Violence: Not on file   Housing Stability: Low Risk  (2025)    Housing Stability Vital Sign     Unable to Pay for Housing in the Last Year: No     Number of Times Moved in the Last Year: 0     Homeless in the Last Year: No          Review of Systems    Per HPI.    BP 94/60   Pulse 77   Temp 98.1 °F (36.7 °C)   Resp 15   Ht 1.842 m (6' 0.5\")   Wt 97.8 kg (215 lb 9.6 oz)   SpO2 99%   BMI 28.84 kg/m²     Physical Examination:         General appearance - alert, well appearing, and in no distress  Chest - clear to auscultation, no wheezes, rales or rhonchi, symmetric air entry  Heart - normal rate, regular rhythm, normal S1, S2, no murmurs, rubs, clicks or gallops  Abdomen - soft,

## 2025-03-27 ENCOUNTER — RESULTS FOLLOW-UP (OUTPATIENT)
Age: 80
End: 2025-03-27

## 2025-03-27 LAB
APPEARANCE UR: ABNORMAL
BACTERIA URNS QL MICRO: ABNORMAL /HPF
BILIRUB UR QL: NEGATIVE
COLOR UR: ABNORMAL
EPITH CASTS URNS QL MICRO: ABNORMAL /LPF
GLUCOSE UR STRIP.AUTO-MCNC: >1000 MG/DL
HGB UR QL STRIP: ABNORMAL
KETONES UR QL STRIP.AUTO: NEGATIVE MG/DL
LEUKOCYTE ESTERASE UR QL STRIP.AUTO: ABNORMAL
NITRITE UR QL STRIP.AUTO: NEGATIVE
PH UR STRIP: 5.5 (ref 5–8)
PROT UR STRIP-MCNC: 30 MG/DL
RBC #/AREA URNS HPF: ABNORMAL /HPF (ref 0–5)
SP GR UR REFRACTOMETRY: 1.02 (ref 1–1.03)
UROBILINOGEN UR QL STRIP.AUTO: 0.2 EU/DL (ref 0.2–1)
WBC URNS QL MICRO: >100 /HPF (ref 0–4)

## 2025-03-27 RX ORDER — BUPROPION HYDROCHLORIDE 300 MG/1
300 TABLET ORAL EVERY MORNING
Qty: 90 TABLET | Refills: 3 | Status: ON HOLD | OUTPATIENT
Start: 2025-03-27

## 2025-03-27 RX ORDER — ESCITALOPRAM OXALATE 20 MG/1
20 TABLET ORAL DAILY
Qty: 90 TABLET | Refills: 3 | Status: ON HOLD | OUTPATIENT
Start: 2025-03-27

## 2025-03-27 RX ORDER — FINASTERIDE 5 MG/1
5 TABLET, FILM COATED ORAL DAILY
Qty: 90 TABLET | Refills: 3 | Status: ON HOLD | OUTPATIENT
Start: 2025-03-27

## 2025-03-27 NOTE — RESULT ENCOUNTER NOTE
Pt on Bactrim.  Sensitives pending - will await results then determine if further treatment is needed.

## 2025-03-28 ENCOUNTER — APPOINTMENT (OUTPATIENT)
Facility: HOSPITAL | Age: 80
DRG: 872 | End: 2025-03-28
Payer: MEDICARE

## 2025-03-28 ENCOUNTER — HOSPITAL ENCOUNTER (INPATIENT)
Facility: HOSPITAL | Age: 80
LOS: 3 days | Discharge: HOME | DRG: 872 | End: 2025-03-31
Attending: EMERGENCY MEDICINE | Admitting: HOSPITALIST
Payer: MEDICARE

## 2025-03-28 DIAGNOSIS — N28.9 ACUTE RENAL INSUFFICIENCY: ICD-10-CM

## 2025-03-28 DIAGNOSIS — E87.20 LACTIC ACIDOSIS: ICD-10-CM

## 2025-03-28 DIAGNOSIS — I95.9 HYPOTENSION, UNSPECIFIED HYPOTENSION TYPE: ICD-10-CM

## 2025-03-28 DIAGNOSIS — E86.0 DEHYDRATION: ICD-10-CM

## 2025-03-28 DIAGNOSIS — N30.00 ACUTE CYSTITIS WITHOUT HEMATURIA: Primary | ICD-10-CM

## 2025-03-28 LAB
ALBUMIN SERPL-MCNC: 3.1 G/DL (ref 3.5–5)
ALBUMIN/GLOB SERPL: 0.8 (ref 1.1–2.2)
ALP SERPL-CCNC: 95 U/L (ref 45–117)
ALT SERPL-CCNC: 16 U/L (ref 12–78)
ANION GAP SERPL CALC-SCNC: 9 MMOL/L (ref 2–12)
APPEARANCE UR: ABNORMAL
AST SERPL-CCNC: 6 U/L (ref 15–37)
BACTERIA URNS QL MICRO: ABNORMAL /HPF
BASE DEFICIT BLD-SCNC: 1.4 MMOL/L
BASOPHILS # BLD: 0.01 K/UL (ref 0–0.1)
BASOPHILS NFR BLD: 0.1 % (ref 0–1)
BILIRUB SERPL-MCNC: 1.3 MG/DL (ref 0.2–1)
BILIRUB UR QL: NEGATIVE
BUN SERPL-MCNC: 78 MG/DL (ref 6–20)
BUN/CREAT SERPL: 33 (ref 12–20)
CALCIUM SERPL-MCNC: 9 MG/DL (ref 8.5–10.1)
CHLORIDE SERPL-SCNC: 93 MMOL/L (ref 97–108)
CO2 SERPL-SCNC: 24 MMOL/L (ref 21–32)
COLOR UR: ABNORMAL
CREAT SERPL-MCNC: 2.39 MG/DL (ref 0.7–1.3)
DIFFERENTIAL METHOD BLD: ABNORMAL
EKG ATRIAL RATE: 67 BPM
EKG DIAGNOSIS: NORMAL
EKG P AXIS: 92 DEGREES
EKG P-R INTERVAL: 366 MS
EKG Q-T INTERVAL: 436 MS
EKG QRS DURATION: 114 MS
EKG QTC CALCULATION (BAZETT): 460 MS
EKG R AXIS: 33 DEGREES
EKG T AXIS: 36 DEGREES
EKG VENTRICULAR RATE: 67 BPM
EOSINOPHIL # BLD: 0.07 K/UL (ref 0–0.4)
EOSINOPHIL NFR BLD: 0.9 % (ref 0–7)
EPITH CASTS URNS QL MICRO: ABNORMAL /LPF
ERYTHROCYTE [DISTWIDTH] IN BLOOD BY AUTOMATED COUNT: 18.7 % (ref 11.5–14.5)
GLOBULIN SER CALC-MCNC: 3.8 G/DL (ref 2–4)
GLUCOSE BLD STRIP.AUTO-MCNC: 249 MG/DL (ref 65–117)
GLUCOSE BLD STRIP.AUTO-MCNC: 425 MG/DL (ref 65–117)
GLUCOSE BLD-MCNC: 530 MG/DL (ref 74–99)
GLUCOSE SERPL-MCNC: 616 MG/DL (ref 65–100)
GLUCOSE UR STRIP.AUTO-MCNC: >1000 MG/DL
HCO3 BLD-SCNC: 24 MMOL/L (ref 21–28)
HCT VFR BLD AUTO: 27.2 % (ref 36.6–50.3)
HGB BLD-MCNC: 8.3 G/DL (ref 12.1–17)
HGB UR QL STRIP: ABNORMAL
IMM GRANULOCYTES # BLD AUTO: 0.03 K/UL (ref 0–0.04)
IMM GRANULOCYTES NFR BLD AUTO: 0.4 % (ref 0–0.5)
KETONES UR QL STRIP.AUTO: NEGATIVE MG/DL
LACTATE BLD-SCNC: 3.36 MMOL/L (ref 0.4–2)
LEUKOCYTE ESTERASE UR QL STRIP.AUTO: ABNORMAL
LYMPHOCYTES # BLD: 0.62 K/UL (ref 0.8–3.5)
LYMPHOCYTES NFR BLD: 7.8 % (ref 12–49)
MCH RBC QN AUTO: 19.9 PG (ref 26–34)
MCHC RBC AUTO-ENTMCNC: 30.5 G/DL (ref 30–36.5)
MCV RBC AUTO: 65.1 FL (ref 80–99)
MONOCYTES # BLD: 0.72 K/UL (ref 0–1)
MONOCYTES NFR BLD: 9.1 % (ref 5–13)
NEUTS SEG # BLD: 6.45 K/UL (ref 1.8–8)
NEUTS SEG NFR BLD: 81.7 % (ref 32–75)
NITRITE UR QL STRIP.AUTO: NEGATIVE
NRBC # BLD: 0 K/UL (ref 0–0.01)
NRBC BLD-RTO: 0 PER 100 WBC
PCO2 BLD: 42.1 MMHG (ref 35–48)
PH BLD: 7.36 (ref 7.35–7.45)
PH UR STRIP: 5.5 (ref 5–8)
PLATELET # BLD AUTO: 252 K/UL (ref 150–400)
PMV BLD AUTO: 9.6 FL (ref 8.9–12.9)
PO2 BLD: <27 MMHG (ref 83–108)
POTASSIUM SERPL-SCNC: 3.7 MMOL/L (ref 3.5–5.1)
PROCALCITONIN SERPL-MCNC: 0.18 NG/ML
PROT SERPL-MCNC: 6.9 G/DL (ref 6.4–8.2)
PROT UR STRIP-MCNC: 30 MG/DL
RBC # BLD AUTO: 4.18 M/UL (ref 4.1–5.7)
RBC #/AREA URNS HPF: ABNORMAL /HPF (ref 0–5)
RBC MORPH BLD: ABNORMAL
RBC MORPH BLD: ABNORMAL
SERVICE CMNT-IMP: ABNORMAL
SODIUM SERPL-SCNC: 126 MMOL/L (ref 136–145)
SP GR UR REFRACTOMETRY: 1.02 (ref 1–1.03)
SPECIMEN TYPE: ABNORMAL
TROPONIN I SERPL HS-MCNC: 11 NG/L (ref 0–76)
URINE CULTURE IF INDICATED: ABNORMAL
UROBILINOGEN UR QL STRIP.AUTO: 0.2 EU/DL (ref 0.2–1)
WBC # BLD AUTO: 7.9 K/UL (ref 4.1–11.1)
WBC URNS QL MICRO: >100 /HPF (ref 0–4)

## 2025-03-28 PROCEDURE — 71045 X-RAY EXAM CHEST 1 VIEW: CPT

## 2025-03-28 PROCEDURE — 87186 SC STD MICRODIL/AGAR DIL: CPT

## 2025-03-28 PROCEDURE — 93005 ELECTROCARDIOGRAM TRACING: CPT | Performed by: EMERGENCY MEDICINE

## 2025-03-28 PROCEDURE — 87040 BLOOD CULTURE FOR BACTERIA: CPT

## 2025-03-28 PROCEDURE — 6360000002 HC RX W HCPCS: Performed by: HOSPITALIST

## 2025-03-28 PROCEDURE — 2500000003 HC RX 250 WO HCPCS: Performed by: HOSPITALIST

## 2025-03-28 PROCEDURE — 2500000003 HC RX 250 WO HCPCS: Performed by: EMERGENCY MEDICINE

## 2025-03-28 PROCEDURE — 87088 URINE BACTERIA CULTURE: CPT

## 2025-03-28 PROCEDURE — 2580000003 HC RX 258: Performed by: HOSPITALIST

## 2025-03-28 PROCEDURE — 84484 ASSAY OF TROPONIN QUANT: CPT

## 2025-03-28 PROCEDURE — 84145 PROCALCITONIN (PCT): CPT

## 2025-03-28 PROCEDURE — 6370000000 HC RX 637 (ALT 250 FOR IP): Performed by: HOSPITALIST

## 2025-03-28 PROCEDURE — 99285 EMERGENCY DEPT VISIT HI MDM: CPT

## 2025-03-28 PROCEDURE — 81001 URINALYSIS AUTO W/SCOPE: CPT

## 2025-03-28 PROCEDURE — 93010 ELECTROCARDIOGRAM REPORT: CPT | Performed by: STUDENT IN AN ORGANIZED HEALTH CARE EDUCATION/TRAINING PROGRAM

## 2025-03-28 PROCEDURE — 2580000003 HC RX 258: Performed by: EMERGENCY MEDICINE

## 2025-03-28 PROCEDURE — 85025 COMPLETE CBC W/AUTO DIFF WBC: CPT

## 2025-03-28 PROCEDURE — 82803 BLOOD GASES ANY COMBINATION: CPT

## 2025-03-28 PROCEDURE — 1100000000 HC RM PRIVATE

## 2025-03-28 PROCEDURE — 82962 GLUCOSE BLOOD TEST: CPT

## 2025-03-28 PROCEDURE — 96374 THER/PROPH/DIAG INJ IV PUSH: CPT

## 2025-03-28 PROCEDURE — 87086 URINE CULTURE/COLONY COUNT: CPT

## 2025-03-28 PROCEDURE — 6360000002 HC RX W HCPCS: Performed by: EMERGENCY MEDICINE

## 2025-03-28 PROCEDURE — 36415 COLL VENOUS BLD VENIPUNCTURE: CPT

## 2025-03-28 PROCEDURE — 83605 ASSAY OF LACTIC ACID: CPT

## 2025-03-28 PROCEDURE — 80053 COMPREHEN METABOLIC PANEL: CPT

## 2025-03-28 RX ORDER — GABAPENTIN 300 MG/1
300 CAPSULE ORAL NIGHTLY
Status: DISCONTINUED | OUTPATIENT
Start: 2025-03-28 | End: 2025-03-31 | Stop reason: HOSPADM

## 2025-03-28 RX ORDER — ESCITALOPRAM OXALATE 10 MG/1
20 TABLET ORAL DAILY
Status: DISCONTINUED | OUTPATIENT
Start: 2025-03-29 | End: 2025-03-31 | Stop reason: HOSPADM

## 2025-03-28 RX ORDER — DEXTROSE MONOHYDRATE 100 MG/ML
INJECTION, SOLUTION INTRAVENOUS CONTINUOUS PRN
Status: DISCONTINUED | OUTPATIENT
Start: 2025-03-28 | End: 2025-03-31 | Stop reason: HOSPADM

## 2025-03-28 RX ORDER — INSULIN LISPRO 100 [IU]/ML
0-4 INJECTION, SOLUTION INTRAVENOUS; SUBCUTANEOUS
Status: DISCONTINUED | OUTPATIENT
Start: 2025-03-28 | End: 2025-03-31 | Stop reason: HOSPADM

## 2025-03-28 RX ORDER — CARVEDILOL 3.12 MG/1
3.12 TABLET ORAL 2 TIMES DAILY WITH MEALS
Status: DISCONTINUED | OUTPATIENT
Start: 2025-03-28 | End: 2025-03-31 | Stop reason: HOSPADM

## 2025-03-28 RX ORDER — SODIUM CHLORIDE 0.9 % (FLUSH) 0.9 %
5-40 SYRINGE (ML) INJECTION EVERY 12 HOURS SCHEDULED
Status: DISCONTINUED | OUTPATIENT
Start: 2025-03-28 | End: 2025-03-31 | Stop reason: HOSPADM

## 2025-03-28 RX ORDER — 0.9 % SODIUM CHLORIDE 0.9 %
1925 INTRAVENOUS SOLUTION INTRAVENOUS ONCE
Status: COMPLETED | OUTPATIENT
Start: 2025-03-28 | End: 2025-03-28

## 2025-03-28 RX ORDER — ROSUVASTATIN CALCIUM 10 MG/1
10 TABLET, COATED ORAL DAILY
Status: DISCONTINUED | OUTPATIENT
Start: 2025-03-29 | End: 2025-03-31 | Stop reason: HOSPADM

## 2025-03-28 RX ORDER — SODIUM CHLORIDE 9 MG/ML
INJECTION, SOLUTION INTRAVENOUS PRN
Status: DISCONTINUED | OUTPATIENT
Start: 2025-03-28 | End: 2025-03-31 | Stop reason: HOSPADM

## 2025-03-28 RX ORDER — FERROUS SULFATE 325(65) MG
325 TABLET ORAL
Status: DISCONTINUED | OUTPATIENT
Start: 2025-03-29 | End: 2025-03-31 | Stop reason: HOSPADM

## 2025-03-28 RX ORDER — ENOXAPARIN SODIUM 100 MG/ML
40 INJECTION SUBCUTANEOUS DAILY
Status: DISCONTINUED | OUTPATIENT
Start: 2025-03-28 | End: 2025-03-31 | Stop reason: DRUGHIGH

## 2025-03-28 RX ORDER — FINASTERIDE 5 MG/1
5 TABLET, FILM COATED ORAL DAILY
Status: DISCONTINUED | OUTPATIENT
Start: 2025-03-29 | End: 2025-03-31 | Stop reason: HOSPADM

## 2025-03-28 RX ORDER — BUPROPION HYDROCHLORIDE 150 MG/1
300 TABLET ORAL EVERY MORNING
Status: DISCONTINUED | OUTPATIENT
Start: 2025-03-29 | End: 2025-03-31 | Stop reason: HOSPADM

## 2025-03-28 RX ORDER — ACETAMINOPHEN 650 MG/1
650 SUPPOSITORY RECTAL EVERY 6 HOURS PRN
Status: DISCONTINUED | OUTPATIENT
Start: 2025-03-28 | End: 2025-03-31 | Stop reason: HOSPADM

## 2025-03-28 RX ORDER — CYCLOBENZAPRINE HCL 10 MG
5 TABLET ORAL 3 TIMES DAILY PRN
Status: DISCONTINUED | OUTPATIENT
Start: 2025-03-28 | End: 2025-03-28

## 2025-03-28 RX ORDER — ACETAMINOPHEN 325 MG/1
650 TABLET ORAL EVERY 6 HOURS PRN
Status: DISCONTINUED | OUTPATIENT
Start: 2025-03-28 | End: 2025-03-31 | Stop reason: HOSPADM

## 2025-03-28 RX ORDER — TAMSULOSIN HYDROCHLORIDE 0.4 MG/1
0.4 CAPSULE ORAL DAILY
Status: DISCONTINUED | OUTPATIENT
Start: 2025-03-29 | End: 2025-03-31 | Stop reason: HOSPADM

## 2025-03-28 RX ORDER — 0.9 % SODIUM CHLORIDE 0.9 %
1000 INTRAVENOUS SOLUTION INTRAVENOUS ONCE
Status: COMPLETED | OUTPATIENT
Start: 2025-03-28 | End: 2025-03-28

## 2025-03-28 RX ORDER — SODIUM CHLORIDE 0.9 % (FLUSH) 0.9 %
5-40 SYRINGE (ML) INJECTION PRN
Status: DISCONTINUED | OUTPATIENT
Start: 2025-03-28 | End: 2025-03-31 | Stop reason: HOSPADM

## 2025-03-28 RX ADMIN — HUMAN INSULIN 8 UNITS: 100 INJECTION, SOLUTION SUBCUTANEOUS at 17:32

## 2025-03-28 RX ADMIN — GABAPENTIN 300 MG: 300 CAPSULE ORAL at 21:56

## 2025-03-28 RX ADMIN — ENOXAPARIN SODIUM 40 MG: 100 INJECTION SUBCUTANEOUS at 14:38

## 2025-03-28 RX ADMIN — WATER 1000 MG: 1 INJECTION INTRAMUSCULAR; INTRAVENOUS; SUBCUTANEOUS at 11:46

## 2025-03-28 RX ADMIN — SODIUM CHLORIDE 1000 ML: 0.9 INJECTION, SOLUTION INTRAVENOUS at 09:54

## 2025-03-28 RX ADMIN — CARVEDILOL 3.12 MG: 3.12 TABLET, FILM COATED ORAL at 17:32

## 2025-03-28 RX ADMIN — SODIUM CHLORIDE 1925 ML: 0.9 INJECTION, SOLUTION INTRAVENOUS at 14:42

## 2025-03-28 RX ADMIN — INSULIN LISPRO 1 UNITS: 100 INJECTION, SOLUTION INTRAVENOUS; SUBCUTANEOUS at 21:56

## 2025-03-28 RX ADMIN — SODIUM CHLORIDE, PRESERVATIVE FREE 10 ML: 5 INJECTION INTRAVENOUS at 21:56

## 2025-03-28 ASSESSMENT — LIFESTYLE VARIABLES
HOW MANY STANDARD DRINKS CONTAINING ALCOHOL DO YOU HAVE ON A TYPICAL DAY: PATIENT DOES NOT DRINK
HOW OFTEN DO YOU HAVE A DRINK CONTAINING ALCOHOL: NEVER

## 2025-03-28 ASSESSMENT — PAIN - FUNCTIONAL ASSESSMENT: PAIN_FUNCTIONAL_ASSESSMENT: NONE - DENIES PAIN

## 2025-03-28 NOTE — ED NOTES
TRANSFER - OUT REPORT:    Verbal report given to corey tao  on Josh Almodovar  being transferred to [facility] for routine progression of patient care       Report consisted of patient's Situation, Background, Assessment and   Recommendations(SBAR).     Information from the following report(s) Nurse Handoff Report, ED Encounter Summary, ED SBAR, MAR, Recent Results, and Cardiac Rhythm sinus rhythm  was reviewed with the receiving nurse.    [place] Fall Assessment:    Presents to emergency department  because of falls (Syncope, seizure, or loss of consciousness): No  Age > 70: Yes  Altered Mental Status, Intoxication with alcohol or substance confusion (Disorientation, impaired judgment, poor safety awaremess, or inability to follow instructions): No  Impaired Mobility: Ambulates or transfers with assistive devices or assistance; Unable to ambulate or transer.: Yes  Nursing Judgement: Yes          Lines:   Peripheral IV 03/28/25 Right Antecubital (Active)       Peripheral IV 03/28/25 Right;Anterior Forearm (Active)   Site Assessment Clean, dry & intact 03/28/25 1000   Line Care Cap changed 03/28/25 1000   Phlebitis Assessment No symptoms 03/28/25 1000   Infiltration Assessment 0 03/28/25 1000        Opportunity for questions and clarification was provided.

## 2025-03-28 NOTE — ED PROVIDER NOTES
[facility] EMERGENCY DEPARTMENT  EMERGENCY DEPARTMENT ENCOUNTER      Pt Name: Josh Almodovar  MRN: [id number]  Birthdate 1945  Date of evaluation: 3/28/2025  Provider: Shai Talamantes DO    CHIEF COMPLAINT       Chief Complaint   Patient presents with    Hypotension    Urinary Tract Infection         HISTORY OF PRESENT ILLNESS   (Location/Symptom, Timing/Onset, Context/Setting, Quality, Duration, Modifying Factors, Severity)  Note limiting factors.   80-year-old male comes in for urinary tract infectious symptoms as well as low blood pressure, hyperglycemia and lightheadedness.  Recent admission for sepsis.  Patient has been home and developed UTI symptoms.  He was placed on Bactrim 2 days ago.  This morning he felt as though he was feeling lightheaded.  He had a reports of a normal blood pressure but then stood up and became hypotensive into the 80s.  His blood sugar reads high for EMS.  Patient states that he is feeling better than he did about an hour ago but overall just feels ill.    The history is provided by the patient and the spouse.         Review of External Medical Records:     Nursing Notes were reviewed.    REVIEW OF SYSTEMS    (2-9 systems for level 4, 10 or more for level 5)     Review of Systems    Except as noted above the remainder of the review of systems was reviewed and negative.       PAST MEDICAL HISTORY     Past Medical History:   Diagnosis Date    CAD (coronary artery disease) 01/23/2017    stent    DJD (degenerative joint disease) of knee 11/2013    left TKR    ED (erectile dysfunction)     Essential hypertension     History of vascular access device 02/04/2025    4 FR Single lumen PICC for LT ABX R baslic length 40 (cm) Verified Max P @ zero (cm) out; arm circ 34 (cm)    Hyperlipidemia     T2DM (type 2 diabetes mellitus) (HCC)     Thalassemia minor          SURGICAL HISTORY       Past Surgical History:   Procedure Laterality Date    CABG, ARTERY-VEIN,

## 2025-03-28 NOTE — ED TRIAGE NOTES
Patient arrives to ed via EMS from home. Per ems pt was dx with sepsis recently and stopped ABX 10 days ago, UTI dx 2 days ago put on Bactrim, still have UTI symptoms. Pt sts he began last night with left side of left hand tingling, denies any further symptoms or unilateral complaints or vision changes. Pt also endorses elevated blood sugars, and lightheadedness when standing as well as dropping of BP when standing or changing positions.

## 2025-03-28 NOTE — H&P
y.o.   male with PMHx CAD, hypertension, hyperlipidemia, diabetes, thalassemia minor comes to the hospital with chief complaint of lightheadedness.  Patient was recently admitted to this hospital with sepsis and blood culture was growing Staph aureus.  Patient was seen by ID.  Patient was discharged home on IV nafcillin which he finished on 3/14/2025.  As per the patient he is feeling lightheaded and days running a low blood pressure.  He was also having a urinary symptoms so he was started on Bactrim 2 days back.  When he woke up this morning he was very lightheaded and when he stood up he became really hypotensive with a systolic blood pressure in the 80s.  He called EMS and on the EMS arrived they checked his blood glucose level which was reading high.  He denies any nausea vomiting diarrhea constipation.  No fever or chills.  He is also complaining of left hand tingling.  When he arrived to the ER his blood pressure was 96/46, pulse 68, respiratory rate 18, temperature 98.1.  Blood work showed a sodium of 126, potassium 3.7, creatinine 2.39, glucose 616, lactic acid 3.36, WBC 7.9, hemoglobin 8.3, platelet count 252.  UA looks infected.  EKG shows normal sinus rhythm at 67 bpm and no acute ST-T changes.  Available records were reviewed at the time of H&P.      Past Medical History:   Diagnosis Date    CAD (coronary artery disease) 01/23/2017    stent    DJD (degenerative joint disease) of knee 11/2013    left TKR    ED (erectile dysfunction)     Essential hypertension     History of vascular access device 02/04/2025    4 FR Single lumen PICC for LT ABX R baslic length 40 (cm) Verified Max P @ zero (cm) out; arm circ 34 (cm)    Hyperlipidemia     T2DM (type 2 diabetes mellitus) (HCC)     Thalassemia minor       Past Surgical History:   Procedure Laterality Date    CABG, ARTERY-VEIN, SINGLE  01/19/2017    DAVINCI JEREMÍAS TAKEDOWN, LEFT ANTERIOR THORACOTOMY, OFF PUMP CORONARY ARTERY BYPASS GRAFTING X1,     84 01/17/2024 08:19 AM    HDL 40 01/17/2024 08:19 AM       Imaging data reviewed:    XR CHEST PORTABLE  Result Date: 3/28/2025  No acute process on portable chest. Electronically signed by JAYCE SAINZ      EKG  Results for orders placed or performed during the hospital encounter of 03/28/25   EKG 12 Lead    Collection Time: 03/28/25  9:18 AM   Result Value Ref Range    Ventricular Rate 67 BPM    Atrial Rate 67 BPM    P-R Interval 366 ms    QRS Duration 114 ms    Q-T Interval 436 ms    QTc Calculation (Bazett) 460 ms    P Axis 92 degrees    R Axis 33 degrees    T Axis 36 degrees    Diagnosis       Sinus rhythm with 1st degree AV block  Cannot rule out Anterior infarct , age undetermined  Abnormal ECG  When compared with ECG of 29-AUG-2018 16:48,  No significant change was found         I have also reviewed available old medical records. ___________________________________  Care Plan discussed with:    Comments   Patient x Discussed with patient in room. POC outlined and Questions answered    Family      RN x    Care Manager                    Consultant:  latrell ED MD   _______________________________________________________________________  Recommended Disposition:   Home with Family x   HH/PT/OT/RN    SNF/LTC    MARY    ________________________________________________________________________  TOTAL TIME:  60 Minutes        Comments   >50% of visit spent in counseling and coordination of care  Chart reviewed  Discussion with patient and/or family and questions answered     ________________________________________________________________________  Signed: Ignacio Dodd MD        Procedures: see electronic medical records for all procedures/Xrays/labs and details which were not copied into this note but were reviewed prior to creation of Plan.

## 2025-03-29 LAB
ANION GAP SERPL CALC-SCNC: 10 MMOL/L (ref 2–12)
BACTERIA SPEC CULT: ABNORMAL
BUN SERPL-MCNC: 61 MG/DL (ref 6–20)
BUN/CREAT SERPL: 33 (ref 12–20)
CALCIUM SERPL-MCNC: 8.4 MG/DL (ref 8.5–10.1)
CC UR VC: ABNORMAL
CHLORIDE SERPL-SCNC: 100 MMOL/L (ref 97–108)
CO2 SERPL-SCNC: 22 MMOL/L (ref 21–32)
CREAT SERPL-MCNC: 1.84 MG/DL (ref 0.7–1.3)
EST. AVERAGE GLUCOSE BLD GHB EST-MCNC: 240 MG/DL
GLUCOSE BLD STRIP.AUTO-MCNC: 324 MG/DL (ref 65–117)
GLUCOSE BLD STRIP.AUTO-MCNC: 369 MG/DL (ref 65–117)
GLUCOSE BLD STRIP.AUTO-MCNC: 376 MG/DL (ref 65–117)
GLUCOSE BLD STRIP.AUTO-MCNC: 380 MG/DL (ref 65–117)
GLUCOSE SERPL-MCNC: 267 MG/DL (ref 65–100)
HBA1C MFR BLD: 10 % (ref 4–5.6)
LACTATE SERPL-SCNC: 1.4 MMOL/L (ref 0.4–2)
POTASSIUM SERPL-SCNC: 3.2 MMOL/L (ref 3.5–5.1)
SERVICE CMNT-IMP: ABNORMAL
SODIUM SERPL-SCNC: 132 MMOL/L (ref 136–145)

## 2025-03-29 PROCEDURE — 2500000003 HC RX 250 WO HCPCS: Performed by: HOSPITALIST

## 2025-03-29 PROCEDURE — 94761 N-INVAS EAR/PLS OXIMETRY MLT: CPT

## 2025-03-29 PROCEDURE — 1100000000 HC RM PRIVATE

## 2025-03-29 PROCEDURE — 6370000000 HC RX 637 (ALT 250 FOR IP): Performed by: HOSPITALIST

## 2025-03-29 PROCEDURE — 6360000002 HC RX W HCPCS: Performed by: HOSPITALIST

## 2025-03-29 PROCEDURE — 36415 COLL VENOUS BLD VENIPUNCTURE: CPT

## 2025-03-29 PROCEDURE — 2580000003 HC RX 258: Performed by: FAMILY MEDICINE

## 2025-03-29 PROCEDURE — 83036 HEMOGLOBIN GLYCOSYLATED A1C: CPT

## 2025-03-29 PROCEDURE — 6370000000 HC RX 637 (ALT 250 FOR IP): Performed by: NURSE PRACTITIONER

## 2025-03-29 PROCEDURE — 6360000002 HC RX W HCPCS: Performed by: FAMILY MEDICINE

## 2025-03-29 PROCEDURE — 82962 GLUCOSE BLOOD TEST: CPT

## 2025-03-29 PROCEDURE — 83605 ASSAY OF LACTIC ACID: CPT

## 2025-03-29 PROCEDURE — 80048 BASIC METABOLIC PNL TOTAL CA: CPT

## 2025-03-29 RX ORDER — SODIUM CHLORIDE, SODIUM LACTATE, POTASSIUM CHLORIDE, CALCIUM CHLORIDE 600; 310; 30; 20 MG/100ML; MG/100ML; MG/100ML; MG/100ML
INJECTION, SOLUTION INTRAVENOUS CONTINUOUS
Status: DISCONTINUED | OUTPATIENT
Start: 2025-03-29 | End: 2025-03-31 | Stop reason: HOSPADM

## 2025-03-29 RX ORDER — POTASSIUM CHLORIDE 750 MG/1
40 TABLET, EXTENDED RELEASE ORAL ONCE
Status: COMPLETED | OUTPATIENT
Start: 2025-03-29 | End: 2025-03-29

## 2025-03-29 RX ORDER — INSULIN LISPRO 100 [IU]/ML
1 INJECTION, SOLUTION INTRAVENOUS; SUBCUTANEOUS ONCE
Status: COMPLETED | OUTPATIENT
Start: 2025-03-29 | End: 2025-03-29

## 2025-03-29 RX ADMIN — INSULIN LISPRO 3 UNITS: 100 INJECTION, SOLUTION INTRAVENOUS; SUBCUTANEOUS at 17:19

## 2025-03-29 RX ADMIN — ROSUVASTATIN CALCIUM 10 MG: 10 TABLET, FILM COATED ORAL at 08:56

## 2025-03-29 RX ADMIN — ACETAMINOPHEN 650 MG: 325 TABLET ORAL at 13:03

## 2025-03-29 RX ADMIN — INSULIN LISPRO 4 UNITS: 100 INJECTION, SOLUTION INTRAVENOUS; SUBCUTANEOUS at 12:09

## 2025-03-29 RX ADMIN — ESCITALOPRAM OXALATE 20 MG: 10 TABLET ORAL at 08:56

## 2025-03-29 RX ADMIN — SODIUM CHLORIDE, PRESERVATIVE FREE 10 ML: 5 INJECTION INTRAVENOUS at 08:58

## 2025-03-29 RX ADMIN — FERROUS SULFATE TAB 325 MG (65 MG ELEMENTAL FE) 325 MG: 325 (65 FE) TAB at 12:09

## 2025-03-29 RX ADMIN — INSULIN LISPRO 4 UNITS: 100 INJECTION, SOLUTION INTRAVENOUS; SUBCUTANEOUS at 08:57

## 2025-03-29 RX ADMIN — INSULIN LISPRO 1 UNITS: 100 INJECTION, SOLUTION INTRAVENOUS; SUBCUTANEOUS at 21:41

## 2025-03-29 RX ADMIN — GABAPENTIN 300 MG: 300 CAPSULE ORAL at 20:24

## 2025-03-29 RX ADMIN — ENOXAPARIN SODIUM 40 MG: 100 INJECTION SUBCUTANEOUS at 08:56

## 2025-03-29 RX ADMIN — CARVEDILOL 3.12 MG: 3.12 TABLET, FILM COATED ORAL at 08:56

## 2025-03-29 RX ADMIN — MEROPENEM 1000 MG: 1 INJECTION INTRAVENOUS at 12:10

## 2025-03-29 RX ADMIN — TAMSULOSIN HYDROCHLORIDE 0.4 MG: 0.4 CAPSULE ORAL at 08:56

## 2025-03-29 RX ADMIN — FINASTERIDE 5 MG: 5 TABLET, FILM COATED ORAL at 08:56

## 2025-03-29 RX ADMIN — POTASSIUM CHLORIDE 40 MEQ: 750 TABLET, FILM COATED, EXTENDED RELEASE ORAL at 09:01

## 2025-03-29 RX ADMIN — ACETAMINOPHEN 650 MG: 325 TABLET ORAL at 00:30

## 2025-03-29 RX ADMIN — BUPROPION HYDROCHLORIDE 300 MG: 150 TABLET, EXTENDED RELEASE ORAL at 08:56

## 2025-03-29 RX ADMIN — SODIUM CHLORIDE, SODIUM LACTATE, POTASSIUM CHLORIDE, AND CALCIUM CHLORIDE: .6; .31; .03; .02 INJECTION, SOLUTION INTRAVENOUS at 12:11

## 2025-03-29 RX ADMIN — INSULIN LISPRO 4 UNITS: 100 INJECTION, SOLUTION INTRAVENOUS; SUBCUTANEOUS at 21:41

## 2025-03-29 NOTE — PLAN OF CARE
Problem: Chronic Conditions and Co-morbidities  Goal: Patient's chronic conditions and co-morbidity symptoms are monitored and maintained or improved  3/29/2025 1948 by Ernestine Sunshine RN  Outcome: Progressing  3/29/2025 1320 by Shannan Edgar RN  Outcome: Progressing     Problem: Discharge Planning  Goal: Discharge to home or other facility with appropriate resources  3/29/2025 1948 by Ernestine Sunshine RN  Outcome: Progressing  3/29/2025 1320 by Shannan Edgar RN  Outcome: Progressing     Problem: Safety - Adult  Goal: Free from fall injury  3/29/2025 1948 by Ernestine Sunshine RN  Outcome: Progressing  3/29/2025 1320 by Shannan Edgar RN  Outcome: Progressing

## 2025-03-29 NOTE — PROGRESS NOTES
[facility]  [address] [place], [place] [address] [phone]    [facility] Adult  Hospitalist Group                                                                                          Hospitalist Progress Note  Sunshine Talamantes MD        Date of Service:  3/29/2025  NAME:  Josh Almodovar  :  1945  MRN:  [id number]      Interval history / Subjective:     Pt states he doesn't feel much better than yday.      Assessment & Plan:     UTI  -outpatient urine cultures growing MDR klebsiella  -rocephin changed to merrem  -follow inpatient cultures     Sepsis  -due to above   -cont fluids   -cont abx and follow cx    TONI  -suspect IVVD  -cont fluids   -hold bp meds     Type 2 DM  -recent A1c 8.4  -cont diabetic diet, ssi    Anxiety and depression  -on lexapro and wellbutrin    CAD  -hx cabg in the past         Outisde Records, prior notes, labs, radiology, and medications reviewed     Code status: full  DVT prophylaxis: heparin       Hospital Problems           Last Modified POA    * (Principal) Sepsis (HCC) 3/28/2025 Yes          Review of Systems:   Pertinent items are noted in HPI.       Vital Signs:    Last 24hrs VS reviewed since prior progress note. Most recent are:  Vitals:    25 1147   BP: 128/65   Pulse: 75   Resp: 16   Temp: 100.4 °F (38 °C)   SpO2: 98%         Intake/Output Summary (Last 24 hours) at 3/29/2025 1243  Last data filed at 3/29/2025 0449  Gross per 24 hour   Intake 385 ml   Output 1500 ml   Net -1115 ml        Physical Examination:             Constitutional:  No acute distress, cooperative, pleasant    ENT:  Oral mucosa moist, oropharynx benign.    Resp:  CTA bilaterally. No wheezing/rhonchi/rales. No accessory muscle use   CV:  Regular rhythm, normal rate, no murmurs, gallops, rubs    GI:  Soft, non distended, non tender. normoactive bowel sounds, no hepatosplenomegaly     Musculoskeletal:  No edema, warm, 2+ pulses throughout  20 mg Oral Daily    ferrous sulfate (IRON 325) tablet 325 mg  325 mg Oral Lunch    finasteride (PROSCAR) tablet 5 mg  5 mg Oral Daily    gabapentin (NEURONTIN) capsule 300 mg  300 mg Oral Nightly    rosuvastatin (CRESTOR) tablet 10 mg  10 mg Oral Daily    tamsulosin (FLOMAX) capsule 0.4 mg  0.4 mg Oral Daily    glucose chewable tablet 16 g  4 tablet Oral PRN    dextrose bolus 10% 125 mL  125 mL IntraVENous PRN    Or    dextrose bolus 10% 250 mL  250 mL IntraVENous PRN    glucagon injection 1 mg  1 mg SubCUTAneous PRN    dextrose 10 % infusion   IntraVENous Continuous PRN    insulin lispro (HUMALOG,ADMELOG) injection vial 0-4 Units  0-4 Units SubCUTAneous 4x Daily AC & HS     ______________________________________________________________________  EXPECTED LENGTH OF STAY:    ACTUAL LENGTH OF STAY:          1                 Sunshine Talamantes MD

## 2025-03-29 NOTE — PROGRESS NOTES
Spiritual Health History and Assessment/Progress Note  [facility]    Attempted Encounter,  ,  ,      Name: Josh Almodovar MRN: [id number]    Age: 80 y.o.     Sex: male   Language: English   Restoration: Other   Sepsis (HCC)     Date: 3/29/2025            Total Time Calculated: 25 min              Spiritual Assessment began in [facility] MULTI-SPECIALTY ORTHOPEDICS 2        Referral/Consult From: Multi-disciplinary team   Encounter Overview/Reason: Attempted Encounter  Service Provided For: Patient    Sharlene, Belief, Meaning:   Patient unable to assess at this time  Family/Friends Other: unable to assess      Importance and Influence:  Patient unable to assess at this time  Family/Friends Other: unable to assess    Community:  Patient Other: unable to assess  Family/Friends Other: unable to assess    Assessment and Plan of Care:     Patient Interventions include: Other: shared  availability  Family/Friends Interventions include: Other: shared  availability    Patient Plan of Care: Spiritual Care available upon further referral  Family/Friends Plan of Care: Spiritual Care available upon further referral    Electronically signed by EVANGELINA Nation on 3/29/2025 at 12:01 PM

## 2025-03-30 LAB
ANION GAP SERPL CALC-SCNC: 11 MMOL/L (ref 2–12)
BACTERIA SPEC CULT: ABNORMAL
BUN SERPL-MCNC: 47 MG/DL (ref 6–20)
BUN/CREAT SERPL: 28 (ref 12–20)
CALCIUM SERPL-MCNC: 8.7 MG/DL (ref 8.5–10.1)
CC UR VC: ABNORMAL
CHLORIDE SERPL-SCNC: 97 MMOL/L (ref 97–108)
CO2 SERPL-SCNC: 21 MMOL/L (ref 21–32)
CREAT SERPL-MCNC: 1.66 MG/DL (ref 0.7–1.3)
GLUCOSE BLD STRIP.AUTO-MCNC: 314 MG/DL (ref 65–117)
GLUCOSE BLD STRIP.AUTO-MCNC: 322 MG/DL (ref 65–117)
GLUCOSE BLD STRIP.AUTO-MCNC: 344 MG/DL (ref 65–117)
GLUCOSE BLD STRIP.AUTO-MCNC: 420 MG/DL (ref 65–117)
GLUCOSE SERPL-MCNC: 287 MG/DL (ref 65–100)
POTASSIUM SERPL-SCNC: 3.3 MMOL/L (ref 3.5–5.1)
SERVICE CMNT-IMP: ABNORMAL
SODIUM SERPL-SCNC: 129 MMOL/L (ref 136–145)

## 2025-03-30 PROCEDURE — 2500000003 HC RX 250 WO HCPCS: Performed by: HOSPITALIST

## 2025-03-30 PROCEDURE — 36415 COLL VENOUS BLD VENIPUNCTURE: CPT

## 2025-03-30 PROCEDURE — 94761 N-INVAS EAR/PLS OXIMETRY MLT: CPT

## 2025-03-30 PROCEDURE — 82962 GLUCOSE BLOOD TEST: CPT

## 2025-03-30 PROCEDURE — 80048 BASIC METABOLIC PNL TOTAL CA: CPT

## 2025-03-30 PROCEDURE — 6370000000 HC RX 637 (ALT 250 FOR IP): Performed by: HOSPITALIST

## 2025-03-30 PROCEDURE — 2580000003 HC RX 258: Performed by: FAMILY MEDICINE

## 2025-03-30 PROCEDURE — 1100000000 HC RM PRIVATE

## 2025-03-30 PROCEDURE — 6360000002 HC RX W HCPCS: Performed by: HOSPITALIST

## 2025-03-30 PROCEDURE — 6370000000 HC RX 637 (ALT 250 FOR IP): Performed by: FAMILY MEDICINE

## 2025-03-30 PROCEDURE — 6360000002 HC RX W HCPCS: Performed by: FAMILY MEDICINE

## 2025-03-30 RX ORDER — INSULIN LISPRO 100 [IU]/ML
12 INJECTION, SOLUTION INTRAVENOUS; SUBCUTANEOUS ONCE
Status: COMPLETED | OUTPATIENT
Start: 2025-03-30 | End: 2025-03-30

## 2025-03-30 RX ORDER — GLIPIZIDE 5 MG/1
5 TABLET ORAL DAILY
Status: DISCONTINUED | OUTPATIENT
Start: 2025-03-30 | End: 2025-03-31 | Stop reason: HOSPADM

## 2025-03-30 RX ADMIN — CARVEDILOL 3.12 MG: 3.12 TABLET, FILM COATED ORAL at 16:28

## 2025-03-30 RX ADMIN — ACETAMINOPHEN 650 MG: 325 TABLET ORAL at 00:34

## 2025-03-30 RX ADMIN — MEROPENEM 1000 MG: 1 INJECTION INTRAVENOUS at 12:05

## 2025-03-30 RX ADMIN — MEROPENEM 1000 MG: 1 INJECTION INTRAVENOUS at 00:36

## 2025-03-30 RX ADMIN — SODIUM CHLORIDE, PRESERVATIVE FREE 10 ML: 5 INJECTION INTRAVENOUS at 08:18

## 2025-03-30 RX ADMIN — INSULIN LISPRO 3 UNITS: 100 INJECTION, SOLUTION INTRAVENOUS; SUBCUTANEOUS at 20:16

## 2025-03-30 RX ADMIN — FERROUS SULFATE TAB 325 MG (65 MG ELEMENTAL FE) 325 MG: 325 (65 FE) TAB at 12:00

## 2025-03-30 RX ADMIN — MEROPENEM 1000 MG: 1 INJECTION INTRAVENOUS at 22:51

## 2025-03-30 RX ADMIN — INSULIN LISPRO 3 UNITS: 100 INJECTION, SOLUTION INTRAVENOUS; SUBCUTANEOUS at 08:18

## 2025-03-30 RX ADMIN — TAMSULOSIN HYDROCHLORIDE 0.4 MG: 0.4 CAPSULE ORAL at 08:17

## 2025-03-30 RX ADMIN — ROSUVASTATIN CALCIUM 10 MG: 10 TABLET, FILM COATED ORAL at 08:17

## 2025-03-30 RX ADMIN — BUPROPION HYDROCHLORIDE 300 MG: 150 TABLET, EXTENDED RELEASE ORAL at 08:17

## 2025-03-30 RX ADMIN — INSULIN LISPRO 12 UNITS: 100 INJECTION, SOLUTION INTRAVENOUS; SUBCUTANEOUS at 12:00

## 2025-03-30 RX ADMIN — INSULIN LISPRO 3 UNITS: 100 INJECTION, SOLUTION INTRAVENOUS; SUBCUTANEOUS at 16:27

## 2025-03-30 RX ADMIN — GLIPIZIDE 5 MG: 5 TABLET ORAL at 12:00

## 2025-03-30 RX ADMIN — ENOXAPARIN SODIUM 40 MG: 100 INJECTION SUBCUTANEOUS at 08:17

## 2025-03-30 RX ADMIN — ESCITALOPRAM OXALATE 20 MG: 10 TABLET ORAL at 08:17

## 2025-03-30 RX ADMIN — CARVEDILOL 3.12 MG: 3.12 TABLET, FILM COATED ORAL at 08:17

## 2025-03-30 RX ADMIN — GABAPENTIN 300 MG: 300 CAPSULE ORAL at 20:16

## 2025-03-30 RX ADMIN — FINASTERIDE 5 MG: 5 TABLET, FILM COATED ORAL at 08:17

## 2025-03-30 NOTE — PLAN OF CARE
Problem: Chronic Conditions and Co-morbidities  Goal: Patient's chronic conditions and co-morbidity symptoms are monitored and maintained or improved  3/30/2025 1955 by Ernestine Sunshine RN  Outcome: Progressing  Flowsheets (Taken 3/30/2025 1948)  Care Plan - Patient's Chronic Conditions and Co-Morbidity Symptoms are Monitored and Maintained or Improved: Monitor and assess patient's chronic conditions and comorbid symptoms for stability, deterioration, or improvement  3/30/2025 1020 by Shannan Edgar, RN  Outcome: Progressing     Problem: Discharge Planning  Goal: Discharge to home or other facility with appropriate resources  3/30/2025 1955 by Ernestine Sunshine RN  Outcome: Progressing  3/30/2025 1020 by Shannan Edgar, RN  Outcome: Progressing     Problem: Safety - Adult  Goal: Free from fall injury  3/30/2025 1955 by Ernestine Sunshine, RN  Outcome: Progressing  3/30/2025 1020 by Shannan Edgar, RN  Outcome: Progressing

## 2025-03-30 NOTE — PROGRESS NOTES
[facility]  [address] [place] [address] [phone]    [facility] Adult  Hospitalist Group                                                                                          Hospitalist Progress Note  Sunshine Talamantes MD        Date of Service:  3/30/2025  NAME:  Josh Almodovar  :  1945  MRN:  [id number]      Interval history / Subjective:     Pt states he doesn't feel much better than yday.      Assessment & Plan:     UTI  -outpatient urine cultures growing MDR klebsiella  -cont merrem  -follow inpatient cultures     Sepsis  -due to above   -cont fluids   -cont abx and follow cx    TONI  -suspect IVVD  -cont fluids   -hold bp meds     Hyponatremia  -Corrected sodium is 133 suspect pseudohyponatremia    Type 2 DM  -recent A1c 8.4  -cont diabetic diet, ssi    Anxiety and depression  -on lexapro and wellbutrin    CAD  -hx cabg in the past         Outisde Records, prior notes, labs, radiology, and medications reviewed     Code status: full  DVT prophylaxis: heparin       Hospital Problems           Last Modified POA    * (Principal) Sepsis (HCC) 3/28/2025 Yes          Review of Systems:   Pertinent items are noted in HPI.       Vital Signs:    Last 24hrs VS reviewed since prior progress note. Most recent are:  Vitals:    25 1134   BP: (!) 121/53   Pulse: 71   Resp: 16   Temp: 98.4 °F (36.9 °C)   SpO2: 98%         Intake/Output Summary (Last 24 hours) at 3/30/2025 1158  Last data filed at 3/30/2025 1015  Gross per 24 hour   Intake --   Output 2150 ml   Net -2150 ml        Physical Examination:             Constitutional:  No acute distress, cooperative, pleasant    ENT:  Oral mucosa moist, oropharynx benign.    Resp:  CTA bilaterally. No wheezing/rhonchi/rales. No accessory muscle use   CV:  Regular rhythm, normal rate, no murmurs, gallops, rubs    GI:  Soft, non distended, non tender. normoactive bowel sounds, no hepatosplenomegaly

## 2025-03-30 NOTE — CARE COORDINATION
Care Management Initial Assessment  3/30/2025 12:27 PM  If patient is discharged prior to next notation, then this note serves as note for discharge by case management.    Reason for Admission:   Dehydration [E86.0]  Lactic acidosis [E87.20]  Acute renal insufficiency [N28.9]  Acute cystitis without hematuria [N30.00]  Sepsis (HCC) [A41.9]  Hypotension, unspecified hypotension type [I95.9]         Patient Admission Status: Inpatient  Date Admitted to INP: 3/28  RUR: Readmission Risk Score: 21.7    Hospitalization in the last 30 days (Readmission):  No        Advance Care Planning:  Code Status: Full Code  Primary Healthcare Decision Maker: (P) Legal Next of Kin  Primary Decision Maker: Kelly Hutton - Spouse - [phone]    Secondary Decision Maker: Waldo Almodovar - [phone]   Advance Directive: NOK     __________________________________________________________________________  Assessment:      03/30/25 1223   Service Assessment   Patient Orientation Alert and Oriented   Cognition Alert   History Provided By Patient   Primary Caregiver Spouse  (independent, lives with spouse who assists as able, they are confident with IV management at home if needed.  He just finished home IV 3/14)   Support Systems Family Members   Patient's Healthcare Decision Maker is: Legal Next of Kin   Prior Functional Level Independent in ADLs/IADLs   Current Functional Level Independent in ADLs/IADLs   Can patient return to prior living arrangement Yes   Ability to make needs known: Good   Family able to assist with home care needs: Yes   Social/Functional History   Lives With Spouse   Type of Home House   Home Layout One level   Home Access Stairs to enter with rails   Entrance Stairs - Number of Steps 3   Home Equipment Cane;Walker - Rolling;Wheelchair - Manual;Grab bars   Receives Help From Other (Comment)  (had home health through [facility] and IV through [facility] until 3/14      has been going to [facility]  [phone]  [address] [facility] [address]  Phone: [phone] Fax: [phone]    DC Transport:         Transition of care plan:    [x]Unable to determine at this time. Awaiting clinical progress, and disposition recommendations.    [] Home. No assistance required.     [] Home. Pt refused recommended services.    [] Home with family assistance as needed, and outpatient follow-up.    [] Home with Outpatient PT and outpatient follow-up   Pt aware of OP appt? []Yes, Provider:   []Not scheduled   Transport provider:     [] Home with outpatient services.    Specify:    [] Home with Home Health   - [facility] of Choice offered? [] Yes, Preference:   [] NA    []SNF/IPR   -[]Freedom of Choice offered, and preferences given:   []Listing provided and preferences requested   -Status: []Pending []Accepted:    -Auth required: []Yes []No    -Auth initiated date:   -3 midnight stay required: []Yes []No  Date satisfied:     [] LTC:     [] Home with Hospice   - [facility] of Choice offered? [] Yes, Preference:   [] NA    [] [facility] information provided.     [] Other:       Sole Ramey  Case Management Department  For questions or concerns, please PerfectServe

## 2025-03-31 VITALS
DIASTOLIC BLOOD PRESSURE: 62 MMHG | BODY MASS INDEX: 31.03 KG/M2 | SYSTOLIC BLOOD PRESSURE: 109 MMHG | OXYGEN SATURATION: 97 % | WEIGHT: 234.13 LBS | RESPIRATION RATE: 17 BRPM | HEIGHT: 73 IN | HEART RATE: 72 BPM | TEMPERATURE: 98.2 F

## 2025-03-31 PROBLEM — N28.9 ACUTE RENAL INSUFFICIENCY: Status: ACTIVE | Noted: 2025-03-31

## 2025-03-31 PROBLEM — E11.65 TYPE 2 DIABETES MELLITUS WITH HYPERGLYCEMIA, WITHOUT LONG-TERM CURRENT USE OF INSULIN (HCC): Status: ACTIVE | Noted: 2025-03-31

## 2025-03-31 PROBLEM — N30.00 ACUTE CYSTITIS WITHOUT HEMATURIA: Status: ACTIVE | Noted: 2025-03-31

## 2025-03-31 LAB
GLUCOSE BLD STRIP.AUTO-MCNC: 254 MG/DL (ref 65–117)
GLUCOSE BLD STRIP.AUTO-MCNC: 353 MG/DL (ref 65–117)
SERVICE CMNT-IMP: ABNORMAL
SERVICE CMNT-IMP: ABNORMAL

## 2025-03-31 PROCEDURE — 94761 N-INVAS EAR/PLS OXIMETRY MLT: CPT

## 2025-03-31 PROCEDURE — 6360000002 HC RX W HCPCS: Performed by: FAMILY MEDICINE

## 2025-03-31 PROCEDURE — 6370000000 HC RX 637 (ALT 250 FOR IP): Performed by: FAMILY MEDICINE

## 2025-03-31 PROCEDURE — 99221 1ST HOSP IP/OBS SF/LOW 40: CPT

## 2025-03-31 PROCEDURE — 6360000002 HC RX W HCPCS: Performed by: HOSPITALIST

## 2025-03-31 PROCEDURE — 6370000000 HC RX 637 (ALT 250 FOR IP): Performed by: HOSPITALIST

## 2025-03-31 PROCEDURE — 82962 GLUCOSE BLOOD TEST: CPT

## 2025-03-31 PROCEDURE — 6370000000 HC RX 637 (ALT 250 FOR IP)

## 2025-03-31 PROCEDURE — 2580000003 HC RX 258: Performed by: FAMILY MEDICINE

## 2025-03-31 RX ORDER — ALCOHOL ANTISEPTIC PADS
PADS, MEDICATED (EA) TOPICAL
Qty: 100 EACH | Refills: 2 | Status: SHIPPED | OUTPATIENT
Start: 2025-03-31 | End: 2025-04-02 | Stop reason: SDUPTHER

## 2025-03-31 RX ORDER — ENOXAPARIN SODIUM 100 MG/ML
30 INJECTION SUBCUTANEOUS 2 TIMES DAILY
Status: DISCONTINUED | OUTPATIENT
Start: 2025-04-01 | End: 2025-03-31 | Stop reason: HOSPADM

## 2025-03-31 RX ORDER — INSULIN GLARGINE 100 [IU]/ML
20 INJECTION, SOLUTION SUBCUTANEOUS DAILY
Qty: 5 ADJUSTABLE DOSE PRE-FILLED PEN SYRINGE | Refills: 2 | Status: SHIPPED | OUTPATIENT
Start: 2025-03-31

## 2025-03-31 RX ORDER — INSULIN LISPRO 100 [IU]/ML
6 INJECTION, SOLUTION INTRAVENOUS; SUBCUTANEOUS ONCE
Status: COMPLETED | OUTPATIENT
Start: 2025-03-31 | End: 2025-03-31

## 2025-03-31 RX ORDER — GLUCOSAMINE HCL/CHONDROITIN SU 500-400 MG
CAPSULE ORAL
Qty: 100 STRIP | Refills: 2 | Status: SHIPPED | OUTPATIENT
Start: 2025-03-31 | End: 2025-04-02 | Stop reason: SDUPTHER

## 2025-03-31 RX ORDER — INSULIN GLARGINE 100 [IU]/ML
20 INJECTION, SOLUTION SUBCUTANEOUS ONCE
Status: COMPLETED | OUTPATIENT
Start: 2025-03-31 | End: 2025-03-31

## 2025-03-31 RX ORDER — DIPHENHYDRAMINE HYDROCHLORIDE 25 MG/1
CAPSULE, LIQUID FILLED ORAL
Qty: 1 KIT | Refills: 0 | Status: SHIPPED | OUTPATIENT
Start: 2025-03-31 | End: 2025-04-02 | Stop reason: SDUPTHER

## 2025-03-31 RX ORDER — METFORMIN HYDROCHLORIDE 500 MG/1
500 TABLET, EXTENDED RELEASE ORAL 2 TIMES DAILY WITH MEALS
Qty: 60 TABLET | Refills: 2 | Status: SHIPPED | OUTPATIENT
Start: 2025-03-31

## 2025-03-31 RX ADMIN — INSULIN LISPRO 2 UNITS: 100 INJECTION, SOLUTION INTRAVENOUS; SUBCUTANEOUS at 11:57

## 2025-03-31 RX ADMIN — GLIPIZIDE 5 MG: 5 TABLET ORAL at 08:44

## 2025-03-31 RX ADMIN — ENOXAPARIN SODIUM 40 MG: 100 INJECTION SUBCUTANEOUS at 08:44

## 2025-03-31 RX ADMIN — INSULIN LISPRO 4 UNITS: 100 INJECTION, SOLUTION INTRAVENOUS; SUBCUTANEOUS at 08:44

## 2025-03-31 RX ADMIN — ESCITALOPRAM OXALATE 20 MG: 10 TABLET ORAL at 08:44

## 2025-03-31 RX ADMIN — CARVEDILOL 3.12 MG: 3.12 TABLET, FILM COATED ORAL at 08:44

## 2025-03-31 RX ADMIN — BUPROPION HYDROCHLORIDE 300 MG: 150 TABLET, EXTENDED RELEASE ORAL at 08:44

## 2025-03-31 RX ADMIN — INSULIN LISPRO 6 UNITS: 100 INJECTION, SOLUTION INTRAVENOUS; SUBCUTANEOUS at 09:42

## 2025-03-31 RX ADMIN — TAMSULOSIN HYDROCHLORIDE 0.4 MG: 0.4 CAPSULE ORAL at 08:44

## 2025-03-31 RX ADMIN — MEROPENEM 1000 MG: 1 INJECTION INTRAVENOUS at 12:01

## 2025-03-31 RX ADMIN — FERROUS SULFATE TAB 325 MG (65 MG ELEMENTAL FE) 325 MG: 325 (65 FE) TAB at 11:57

## 2025-03-31 RX ADMIN — ROSUVASTATIN CALCIUM 10 MG: 10 TABLET, FILM COATED ORAL at 08:44

## 2025-03-31 RX ADMIN — INSULIN GLARGINE 20 UNITS: 100 INJECTION, SOLUTION SUBCUTANEOUS at 14:18

## 2025-03-31 RX ADMIN — FINASTERIDE 5 MG: 5 TABLET, FILM COATED ORAL at 08:44

## 2025-03-31 NOTE — PROGRESS NOTES
Hospital follow up with PCP set for Tuesday April 15th 2025 at 9:30 AM with Doctor Daly at their [place] location.

## 2025-03-31 NOTE — CONSULTS
DM consulted on Josh Almodovar, who is an 80 y.o. male with PMHx of T2DM, CAD, HTN, BPH, anxiety/depression, who was admitted on 3/28with lightheadedness and low BP. EMS noted BG was also \"very high\" at home. Work up revealed sepsis due to UTI. Labs on admission noted for , A1c 10.0%, Na 126, Cl 93, creat 2.39, lactic 3.36, Hgb 8.3, and 4+ bacteria in urine. He was started on IVF and IV antibiotics. Treatment per primary team. DM asked to assist with glycemic management and discharge plan on 3/31.     Patient has had Type 2 diabetes for about 30 years. Has been on Glipizide 5 mg daily and Jardiance 10 mg daily (been on Jardiance for about 2 years) prior to admission. He was on Metformin at one time with no issues, but came off of this when Jardiance was started. He had not been checking his Bg at all. His PCP has provided a free CGM for him but has not tried this yet. He was hospitalized in January with sepsis as well, so has had frequent infections driving BG up. However, he may have had uncontrolled BG prior to this that put him at risk of infection. It appears he had a UTI at that time also - uncertain if any other infections on that admission. [id number] have a risk of causing UTIs - Jardiance now discontinued. Both he and his wife eat a healthy, well balanced diet and seem to be active. He still works as a therapist on a per suki basis.     Patient's BG ranging in the 300-400 over the weekend. He received correctional insulin only. BG prior to lunch was 254 today. He was started on his daily Glipizide, which is helping a bit. However, his A1c is 10.0%. Discussed plan going home with patient and wife. ADA recommendation at this time would be to start daily long acting insulin. Reviewed insulin pen use and care, BG and A1c goals, s/s hypoglycemia and how to treat, healthy plate and carb control, when to call provider. Nurse assisted patient in giving first insulin injection and he did well. Emphasized on

## 2025-03-31 NOTE — PROGRESS NOTES
Enoxaparin 40 mg SQ Q24H adjusted to 30 mg SQ Q12H (weight 101-150 kg, CrCl > 30 mL/min) per protocol    Thank you,  Jai Villatoro, Pharm D, BCPS  [phone]

## 2025-03-31 NOTE — CARE COORDINATION
Care Management Progress Note      Reason for Admission:   Dehydration [E86.0]  Lactic acidosis [E87.20]  Acute renal insufficiency [N28.9]  Acute cystitis without hematuria [N30.00]  Sepsis (HCC) [A41.9]  Hypotension, unspecified hypotension type [I95.9]         Patient Admission Status: Inpatient  RUR: 21%  Hospitalization in the last 30 days (Readmission):  No        Transition of care plan:  Patient was discussed in IDR and is medically ready for discharge.     Dispo: return home with wife. Patient was independent PTA and has required DME at home. Patient has a fail on his [employer] and a 7 on the [facility]. Therapy and CM needs were not indicated in rounds.     Discharge plan communicated with patient and/or discharge caregiver: Yes      Date 1st IMM letter given: 3/28/25. 2nd IMM: 3/31/25.    Outpatient follow-up.    Transport at discharge: wife.        ___________________________________________   Maryann Brunner RN Case Manager  3/31/2025   10:44 AM

## 2025-03-31 NOTE — DISCHARGE SUMMARY
[facility]  [address] [place] [address] [phone]    [facility] Adult  Hospitalist Group     Discharge Summary       PATIENT ID: Josh Almodovar  MRN: [id number]   YOB: 1945    DATE OF ADMISSION: 3/28/2025  8:58 AM    DATE OF DISCHARGE: 03/31/25   PRIMARY CARE PROVIDER: Jason Daly MD     DISCHARGING PROVIDER: Sunshine Talamantes MD      CONSULTATIONS: IP CONSULT TO DIABETES MANAGEMENT    PROCEDURES/SURGERIES: * No surgery found *    ADMITTING DIAGNOSES & HOSPITAL COURSE:     UTI  -outpatient urine cultures growing MDR klebsiella  -completed merrem  -inpatient cultures also growing MDR     Sepsis  -due to above   -cont fluids   -cont abx and follow cx     TONI  -suspect IVVD  -cont fluids   -resume lisinopril. Stopped hctz     Hyponatremia  -Corrected sodium is 133 suspect pseudohyponatremia     Type 2 DM  -recent A1c 8.4  -cont diabetic diet, ssi  -consulted diabetes management, new meds per their recommendation. He likely requires insulin     Anxiety and depression  -on lexapro and wellbutrin     CAD  -hx cabg in the past         PENDING TEST RESULTS:   At the time of discharge the following test results are still pending: none    FOLLOW UP APPOINTMENTS:    Jason Daly MD  [address] [address] [phone]    Follow up in 1 week(s)  Discharge follow up         DIET: diabetic     ACTIVITY: as tolerated     DISCHARGE MEDICATIONS:     Medication List        CONTINUE taking these medications      acetaminophen 500 MG tablet  Commonly known as: TYLENOL     aspirin 81 MG EC tablet     buPROPion 300 MG extended release tablet  Commonly known as: WELLBUTRIN XL  TAKE 1 TABLET EVERY MORNING     carvedilol 6.25 MG tablet  Commonly known as: COREG     CENTRUM SILVER PO     escitalopram 20 MG tablet  Commonly known as: LEXAPRO  TAKE 1 TABLET DAILY     ferrous sulfate 325 (65 Fe) MG tablet  Commonly known as:           Skin turgor normal, Capillary refill normal  Skin:                Not pale.  Not Jaundiced  No rashes   Psych:             Not anxious or agitated.  Neurologic:      Alert, moves all extremities, answers questions appropriately and responds to commands       CHRONIC MEDICAL DIAGNOSES:      Greater than 30 minutes were spent with the patient on counseling and coordination of care    Signed:   Sunshine Talamantes MD  3/31/2025  12:13 PM

## 2025-04-02 RX ORDER — DIPHENHYDRAMINE HYDROCHLORIDE 25 MG/1
CAPSULE, LIQUID FILLED ORAL
Qty: 1 KIT | Refills: 0 | Status: SHIPPED | OUTPATIENT
Start: 2025-04-02

## 2025-04-02 RX ORDER — ALCOHOL ANTISEPTIC PADS
PADS, MEDICATED (EA) TOPICAL
Qty: 100 EACH | Refills: 2 | Status: SHIPPED | OUTPATIENT
Start: 2025-04-02

## 2025-04-02 RX ORDER — GLUCOSAMINE HCL/CHONDROITIN SU 500-400 MG
CAPSULE ORAL
Qty: 100 STRIP | Refills: 2 | Status: SHIPPED | OUTPATIENT
Start: 2025-04-02 | End: 2025-04-02

## 2025-04-02 RX ORDER — GLUCOSAMINE HCL/CHONDROITIN SU 500-400 MG
CAPSULE ORAL
Qty: 100 STRIP | Refills: 2 | Status: SHIPPED | OUTPATIENT
Start: 2025-04-02

## 2025-04-02 RX ORDER — ALCOHOL ANTISEPTIC PADS
PADS, MEDICATED (EA) TOPICAL
Qty: 100 EACH | Refills: 2 | Status: SHIPPED | OUTPATIENT
Start: 2025-04-02 | End: 2025-04-02

## 2025-04-02 RX ORDER — DIPHENHYDRAMINE HYDROCHLORIDE 25 MG/1
CAPSULE, LIQUID FILLED ORAL
Qty: 1 KIT | Refills: 0 | Status: SHIPPED | OUTPATIENT
Start: 2025-04-02 | End: 2025-04-02

## 2025-04-02 NOTE — TELEPHONE ENCOUNTER
Spoke with Belia Linda  in regards to patient's need for testing supplies       The patient was recently discharged from [facility]   Upon discharge patient given a script for diabetic testing supplies but they neglected to add the diagnosis code to the script. DX codes are required for testing supplies when the patient has medicare     Belia reached out in hopes to get the patient a new set of scripts sent in with diagnosis code    Patient has added insulin to his medication list since his last visit with pcp so the dx code will not match what was previously in the patient's chart    The correct icd10 code:  E10.9: Type 1 diabetes mellitus without complications   Script pended and sent to available nurse to sign

## 2025-04-08 NOTE — PROGRESS NOTES
Physician Progress Note      PATIENT:               CHELLY BUTLER  [facility] #:                  [id number]  :                       1945  ADMIT DATE:       3/28/2025 8:58 AM  DISCH DATE:        3/31/2025 3:40 PM  RESPONDING  PROVIDER #:        Sunshine Rojas MD          QUERY TEXT:    Sepsis is documented in the medical record 3/28 HP and 3/31 DC summary. Please   provide additional clinical indicators supportive of your documentation. Or   please document if the diagnosis of sepsis has been ruled out after study.    The clinical indicators include:  per 3/28 HP - 80 year old male, UTI, DM, TONI  comes to the hospital with chief complaint of lightheadedness.  Patient was   recently admitted to this hospital with sepsis and blood culture was growing   Staph aureus.  Patient was seen by ID.  Patient was discharged home on IV   nafcillin which he finished on 3/14/2025.  As per the patient he is feeling   lightheaded and days running a low blood pressure.  He was also having a   urinary symptoms so he was started on Bactrim 2 days back.  When he woke up   this morning he was very lightheaded and when he stood up he became really   hypotensive with a systolic blood pressure in the 80s.  Sepsis  Due to UTI.    per labs: WBC 3/28: 7.9  Lactic Acid 3/29: 1.4  POC Lactic Acid 3/28: 3.36  Procalcitonin 3/28: 0.18  Urine culture 3/28: Klebsiella pneumoniae    VS 3/28 0912: 98.1, 18, 68, 96/46, 100% RA  1530: 99.1, 18, 84, 115/84, 96% RA  1609: 98.2, 18, 89, 115/65, 93% RA  2030: 99.7, 24, 80, 115/57, 96% RA    IVF NS bolus, IV Ceftriaxone, IVF LR, IV Meropenem  Options provided:  -- Sepsis present as evidenced by, Please document evidence.  -- Sepsis was ruled out after study  -- Other - I will add my own diagnosis  -- Disagree - Not applicable / Not valid  -- Disagree - Clinically unable to determine / Unknown  -- Refer to Clinical Documentation Reviewer    PROVIDER RESPONSE TEXT:    Sepsis was ruled out after

## 2025-04-24 ENCOUNTER — OFFICE VISIT (OUTPATIENT)
Age: 80
End: 2025-04-24
Payer: MEDICARE

## 2025-04-24 VITALS
TEMPERATURE: 98.2 F | HEIGHT: 73 IN | BODY MASS INDEX: 29.16 KG/M2 | HEART RATE: 66 BPM | WEIGHT: 220 LBS | OXYGEN SATURATION: 99 % | SYSTOLIC BLOOD PRESSURE: 138 MMHG | RESPIRATION RATE: 15 BRPM | DIASTOLIC BLOOD PRESSURE: 82 MMHG

## 2025-04-24 DIAGNOSIS — Z09 HOSPITAL DISCHARGE FOLLOW-UP: ICD-10-CM

## 2025-04-24 DIAGNOSIS — E11.8 DM (DIABETES MELLITUS), TYPE 2 WITH COMPLICATIONS (HCC): ICD-10-CM

## 2025-04-24 DIAGNOSIS — I10 HTN (HYPERTENSION), BENIGN: ICD-10-CM

## 2025-04-24 DIAGNOSIS — B95.61 BACTEREMIA DUE TO METHICILLIN SUSCEPTIBLE STAPHYLOCOCCUS AUREUS (MSSA): ICD-10-CM

## 2025-04-24 DIAGNOSIS — B95.61 BACTEREMIA DUE TO METHICILLIN SUSCEPTIBLE STAPHYLOCOCCUS AUREUS (MSSA): Primary | ICD-10-CM

## 2025-04-24 DIAGNOSIS — I50.22 SYSTOLIC CHF, CHRONIC (HCC): ICD-10-CM

## 2025-04-24 DIAGNOSIS — R78.81 BACTEREMIA DUE TO METHICILLIN SUSCEPTIBLE STAPHYLOCOCCUS AUREUS (MSSA): Primary | ICD-10-CM

## 2025-04-24 DIAGNOSIS — F33.1 MAJOR DEPRESSIVE DISORDER, RECURRENT, MODERATE (HCC): ICD-10-CM

## 2025-04-24 DIAGNOSIS — I25.10 CORONARY ARTERY DISEASE INVOLVING NATIVE CORONARY ARTERY OF NATIVE HEART WITHOUT ANGINA PECTORIS: ICD-10-CM

## 2025-04-24 DIAGNOSIS — R78.81 BACTEREMIA DUE TO METHICILLIN SUSCEPTIBLE STAPHYLOCOCCUS AUREUS (MSSA): ICD-10-CM

## 2025-04-24 PROCEDURE — 1111F DSCHRG MED/CURRENT MED MERGE: CPT | Performed by: INTERNAL MEDICINE

## 2025-04-24 PROCEDURE — 3075F SYST BP GE 130 - 139MM HG: CPT | Performed by: INTERNAL MEDICINE

## 2025-04-24 PROCEDURE — 99214 OFFICE O/P EST MOD 30 MIN: CPT | Performed by: INTERNAL MEDICINE

## 2025-04-24 PROCEDURE — G8417 CALC BMI ABV UP PARAM F/U: HCPCS | Performed by: INTERNAL MEDICINE

## 2025-04-24 PROCEDURE — 1123F ACP DISCUSS/DSCN MKR DOCD: CPT | Performed by: INTERNAL MEDICINE

## 2025-04-24 PROCEDURE — 1126F AMNT PAIN NOTED NONE PRSNT: CPT | Performed by: INTERNAL MEDICINE

## 2025-04-24 PROCEDURE — 1036F TOBACCO NON-USER: CPT | Performed by: INTERNAL MEDICINE

## 2025-04-24 PROCEDURE — G8428 CUR MEDS NOT DOCUMENT: HCPCS | Performed by: INTERNAL MEDICINE

## 2025-04-24 PROCEDURE — 3079F DIAST BP 80-89 MM HG: CPT | Performed by: INTERNAL MEDICINE

## 2025-04-24 PROCEDURE — 3046F HEMOGLOBIN A1C LEVEL >9.0%: CPT | Performed by: INTERNAL MEDICINE

## 2025-04-24 RX ORDER — AMPICILLIN TRIHYDRATE 250 MG
CAPSULE ORAL
COMMUNITY

## 2025-04-24 RX ORDER — CHOLECALCIFEROL (VITAMIN D3) 1250 MCG
CAPSULE ORAL
COMMUNITY

## 2025-04-24 NOTE — PROGRESS NOTES
Post-Discharge Transitional Care  Follow Up      Josh Almodovar   YOB: 1945    Date of Office Visit:  4/24/2025  Date of Hospital Admission: 3/28/25  Date of Hospital Discharge: 3/31/25  Risk of hospital readmission (high >=14%. Medium >=10%) :Readmission Risk Score: 17.3      Care management risk score Rising risk (score 2-5) and Complex Care (Scores >=6): No Risk Score On File     Non face to face  following discharge, date last encounter closed (first attempt may have been earlier): *No documented post hospital discharge outreach found in the last 14 days    Call initiated 2 business days of discharge: *No response recorded in the last 14 days    ASSESSMENT/PLAN:   Bacteremia due to methicillin susceptible Staphylococcus aureus (MSSA)-given hematuria on recent testing, will repeat urinalysis and culture if necessary.  Appears to be resolved with stopping his SGLT2 inhibitor.  Will follow-up with urology.  -     Basic Metabolic Panel; Future  -     CBC with Auto Differential; Future  -     Urinalysis with Microscopic; Future  Coronary artery disease involving native coronary artery of native heart without angina pectoris--stable.  DM (diabetes mellitus), type 2 with complications (HCC)-blood sugars seem to be improved on once daily insulin.  Will continue to monitor on that.  -     Basic Metabolic Panel; Future  -     CBC with Auto Differential; Future  -     Urinalysis with Microscopic; Future  Systolic CHF, chronic (HCC)-stable.  HTN (hypertension), benign-stable.  Major depressive disorder, recurrent, moderate-stable  Viral URI-treat symptomatically with cough meds as needed.  Hospital discharge follow-up  -     VT DISCHARGE MEDS RECONCILED W/ CURRENT OUTPATIENT MED LIST      Medical Decision Making: moderate complexity  Return in 3 months (on 7/24/2025).           Subjective:   HPI:  Follow up of Hospital problems/diagnosis(es): Urosepsis.    Inpatient course: Discharge summary reviewed-

## 2025-04-25 ENCOUNTER — RESULTS FOLLOW-UP (OUTPATIENT)
Age: 80
End: 2025-04-25

## 2025-04-25 LAB
ANION GAP SERPL CALC-SCNC: 7 MMOL/L (ref 2–12)
APPEARANCE UR: CLEAR
BACTERIA URNS QL MICRO: NEGATIVE /HPF
BASOPHILS # BLD: 0.02 K/UL (ref 0–0.1)
BASOPHILS NFR BLD: 0.3 % (ref 0–1)
BILIRUB UR QL: NEGATIVE
BUN SERPL-MCNC: 22 MG/DL (ref 6–20)
BUN/CREAT SERPL: 23 (ref 12–20)
CALCIUM SERPL-MCNC: 8.9 MG/DL (ref 8.5–10.1)
CHLORIDE SERPL-SCNC: 108 MMOL/L (ref 97–108)
CO2 SERPL-SCNC: 27 MMOL/L (ref 21–32)
COLOR UR: ABNORMAL
CREAT SERPL-MCNC: 0.97 MG/DL (ref 0.7–1.3)
DIFFERENTIAL METHOD BLD: ABNORMAL
EOSINOPHIL # BLD: 0.15 K/UL (ref 0–0.4)
EOSINOPHIL NFR BLD: 2 % (ref 0–7)
EPITH CASTS URNS QL MICRO: ABNORMAL /LPF
ERYTHROCYTE [DISTWIDTH] IN BLOOD BY AUTOMATED COUNT: 16.8 % (ref 11.5–14.5)
GLUCOSE SERPL-MCNC: 104 MG/DL (ref 65–100)
GLUCOSE UR STRIP.AUTO-MCNC: NEGATIVE MG/DL
HCT VFR BLD AUTO: 30.3 % (ref 36.6–50.3)
HGB BLD-MCNC: 9.1 G/DL (ref 12.1–17)
HGB UR QL STRIP: NEGATIVE
HYALINE CASTS URNS QL MICRO: ABNORMAL /LPF (ref 0–5)
IMM GRANULOCYTES # BLD AUTO: 0.02 K/UL (ref 0–0.04)
IMM GRANULOCYTES NFR BLD AUTO: 0.3 % (ref 0–0.5)
KETONES UR QL STRIP.AUTO: ABNORMAL MG/DL
LEUKOCYTE ESTERASE UR QL STRIP.AUTO: NEGATIVE
LYMPHOCYTES # BLD: 1.41 K/UL (ref 0.8–3.5)
LYMPHOCYTES NFR BLD: 18.8 % (ref 12–49)
MCH RBC QN AUTO: 19.4 PG (ref 26–34)
MCHC RBC AUTO-ENTMCNC: 30 G/DL (ref 30–36.5)
MCV RBC AUTO: 64.7 FL (ref 80–99)
MONOCYTES # BLD: 0.61 K/UL (ref 0–1)
MONOCYTES NFR BLD: 8.1 % (ref 5–13)
NEUTS SEG # BLD: 5.29 K/UL (ref 1.8–8)
NEUTS SEG NFR BLD: 70.5 % (ref 32–75)
NITRITE UR QL STRIP.AUTO: NEGATIVE
NRBC # BLD: 0 K/UL (ref 0–0.01)
NRBC BLD-RTO: 0 PER 100 WBC
PH UR STRIP: 5.5 (ref 5–8)
PLATELET # BLD AUTO: 223 K/UL (ref 150–400)
PMV BLD AUTO: 9.8 FL (ref 8.9–12.9)
POTASSIUM SERPL-SCNC: 3.8 MMOL/L (ref 3.5–5.1)
PROT UR STRIP-MCNC: 30 MG/DL
RBC # BLD AUTO: 4.68 M/UL (ref 4.1–5.7)
RBC #/AREA URNS HPF: ABNORMAL /HPF (ref 0–5)
RBC MORPH BLD: ABNORMAL
RBC MORPH BLD: ABNORMAL
SODIUM SERPL-SCNC: 142 MMOL/L (ref 136–145)
SP GR UR REFRACTOMETRY: 1.02 (ref 1–1.03)
SPECIMEN HOLD: NORMAL
UROBILINOGEN UR QL STRIP.AUTO: 0.2 EU/DL (ref 0.2–1)
WBC # BLD AUTO: 7.5 K/UL (ref 4.1–11.1)
WBC URNS QL MICRO: ABNORMAL /HPF (ref 0–4)

## 2025-05-27 RX ORDER — CARVEDILOL 6.25 MG/1
6.25 TABLET ORAL 2 TIMES DAILY
Qty: 180 TABLET | Refills: 3 | Status: SHIPPED | OUTPATIENT
Start: 2025-05-27

## 2025-07-01 ENCOUNTER — TELEPHONE (OUTPATIENT)
Age: 80
End: 2025-07-01

## 2025-07-01 RX ORDER — METFORMIN HYDROCHLORIDE 500 MG/1
500 TABLET, EXTENDED RELEASE ORAL 2 TIMES DAILY WITH MEALS
Qty: 180 TABLET | Refills: 0 | Status: SHIPPED | OUTPATIENT
Start: 2025-07-01 | End: 2025-09-29

## 2025-07-01 NOTE — TELEPHONE ENCOUNTER
Last Appointment with Jason Daly MD:  4/24/2025     Future Appointments   Date Time Provider Department Center   11/5/2025  1:00 PM Jason Daly MD [facility] DEP

## 2025-07-01 NOTE — TELEPHONE ENCOUNTER
Medication Refill Request    Josh Almodovar is requesting a refill of the following medication(s):   metFORMIN (GLUCOPHAGE-XR) 500 MG extended release tablet     Please send 90 days supply    Please send refill to:   [facility] - [place], [place] - [address] [place] [address] - P [phone] - F [phone] [address] [facility] [address]  Phone: [phone] Fax: [phone]

## 2025-07-22 RX ORDER — GLIPIZIDE 5 MG/1
5 TABLET ORAL DAILY
Qty: 90 TABLET | Refills: 3 | Status: SHIPPED | OUTPATIENT
Start: 2025-07-22

## 2025-09-05 ENCOUNTER — PATIENT MESSAGE (OUTPATIENT)
Age: 80
End: 2025-09-05

## 2025-09-05 RX ORDER — GLUCOSAMINE HCL/CHONDROITIN SU 500-400 MG
CAPSULE ORAL
Qty: 100 STRIP | Refills: 2 | Status: SHIPPED | OUTPATIENT
Start: 2025-09-05

## (undated) DEVICE — TRAP SURG QUAD PARABOLA SLOT DSGN SFTY SCRN TRAPEASE

## (undated) DEVICE — FORCEPS BX L240CM JAW DIA2.8MM L CAP W/ NDL MIC MESH TOOTH

## (undated) DEVICE — TUBING HYDR IRR --

## (undated) DEVICE — SNARE VASC L240CM LOOP W10MM SHTH DIA2.4MM RND STIFF CLD